# Patient Record
Sex: FEMALE | Race: OTHER | HISPANIC OR LATINO
[De-identification: names, ages, dates, MRNs, and addresses within clinical notes are randomized per-mention and may not be internally consistent; named-entity substitution may affect disease eponyms.]

---

## 2017-01-12 ENCOUNTER — APPOINTMENT (OUTPATIENT)
Dept: HEMATOLOGY ONCOLOGY | Facility: CLINIC | Age: 57
End: 2017-01-12

## 2017-05-03 ENCOUNTER — APPOINTMENT (OUTPATIENT)
Dept: CT IMAGING | Facility: IMAGING CENTER | Age: 57
End: 2017-05-03

## 2017-05-03 ENCOUNTER — OUTPATIENT (OUTPATIENT)
Dept: OUTPATIENT SERVICES | Facility: HOSPITAL | Age: 57
LOS: 1 days | End: 2017-05-03
Payer: COMMERCIAL

## 2017-05-03 DIAGNOSIS — Z00.8 ENCOUNTER FOR OTHER GENERAL EXAMINATION: ICD-10-CM

## 2017-05-03 PROCEDURE — 71260 CT THORAX DX C+: CPT

## 2017-05-04 ENCOUNTER — OUTPATIENT (OUTPATIENT)
Dept: OUTPATIENT SERVICES | Facility: HOSPITAL | Age: 57
LOS: 1 days | Discharge: HOME | End: 2017-05-04

## 2017-05-04 DIAGNOSIS — C34.90 MALIGNANT NEOPLASM OF UNSPECIFIED PART OF UNSPECIFIED BRONCHUS OR LUNG: ICD-10-CM

## 2017-05-05 ENCOUNTER — APPOINTMENT (OUTPATIENT)
Dept: HEMATOLOGY ONCOLOGY | Facility: CLINIC | Age: 57
End: 2017-05-05

## 2017-05-05 VITALS
DIASTOLIC BLOOD PRESSURE: 91 MMHG | OXYGEN SATURATION: 99 % | RESPIRATION RATE: 16 BRPM | SYSTOLIC BLOOD PRESSURE: 158 MMHG | BODY MASS INDEX: 30.77 KG/M2 | TEMPERATURE: 97.9 F | HEART RATE: 86 BPM | WEIGHT: 173.72 LBS

## 2017-11-27 ENCOUNTER — FORM ENCOUNTER (OUTPATIENT)
Age: 57
End: 2017-11-27

## 2017-11-28 ENCOUNTER — OUTPATIENT (OUTPATIENT)
Dept: OUTPATIENT SERVICES | Facility: HOSPITAL | Age: 57
LOS: 1 days | End: 2017-11-28
Payer: COMMERCIAL

## 2017-11-28 ENCOUNTER — APPOINTMENT (OUTPATIENT)
Dept: CT IMAGING | Facility: IMAGING CENTER | Age: 57
End: 2017-11-28
Payer: COMMERCIAL

## 2017-11-28 DIAGNOSIS — Z00.8 ENCOUNTER FOR OTHER GENERAL EXAMINATION: ICD-10-CM

## 2017-11-28 PROCEDURE — 71260 CT THORAX DX C+: CPT | Mod: 26

## 2017-11-28 PROCEDURE — 71260 CT THORAX DX C+: CPT

## 2017-11-29 ENCOUNTER — OUTPATIENT (OUTPATIENT)
Dept: OUTPATIENT SERVICES | Facility: HOSPITAL | Age: 57
LOS: 1 days | Discharge: HOME | End: 2017-11-29

## 2017-11-29 ENCOUNTER — APPOINTMENT (OUTPATIENT)
Dept: HEMATOLOGY ONCOLOGY | Facility: CLINIC | Age: 57
End: 2017-11-29
Payer: COMMERCIAL

## 2017-11-29 VITALS
SYSTOLIC BLOOD PRESSURE: 125 MMHG | RESPIRATION RATE: 16 BRPM | HEART RATE: 90 BPM | WEIGHT: 153.22 LBS | BODY MASS INDEX: 27.14 KG/M2 | DIASTOLIC BLOOD PRESSURE: 85 MMHG | OXYGEN SATURATION: 98 % | TEMPERATURE: 98 F

## 2017-11-29 DIAGNOSIS — C34.90 MALIGNANT NEOPLASM OF UNSPECIFIED PART OF UNSPECIFIED BRONCHUS OR LUNG: ICD-10-CM

## 2017-11-29 PROCEDURE — 99215 OFFICE O/P EST HI 40 MIN: CPT

## 2018-02-25 ENCOUNTER — FORM ENCOUNTER (OUTPATIENT)
Age: 58
End: 2018-02-25

## 2018-02-26 ENCOUNTER — APPOINTMENT (OUTPATIENT)
Dept: CT IMAGING | Facility: IMAGING CENTER | Age: 58
End: 2018-02-26
Payer: COMMERCIAL

## 2018-02-26 ENCOUNTER — OUTPATIENT (OUTPATIENT)
Dept: OUTPATIENT SERVICES | Facility: HOSPITAL | Age: 58
LOS: 1 days | End: 2018-02-26
Payer: COMMERCIAL

## 2018-02-26 DIAGNOSIS — C34.12 MALIGNANT NEOPLASM OF UPPER LOBE, LEFT BRONCHUS OR LUNG: ICD-10-CM

## 2018-02-26 PROCEDURE — 71260 CT THORAX DX C+: CPT | Mod: 26

## 2018-02-26 PROCEDURE — 71260 CT THORAX DX C+: CPT

## 2018-02-27 ENCOUNTER — APPOINTMENT (OUTPATIENT)
Dept: HEMATOLOGY ONCOLOGY | Facility: CLINIC | Age: 58
End: 2018-02-27
Payer: SELF-PAY

## 2018-02-27 ENCOUNTER — OUTPATIENT (OUTPATIENT)
Dept: OUTPATIENT SERVICES | Facility: HOSPITAL | Age: 58
LOS: 1 days | Discharge: HOME | End: 2018-02-27

## 2018-02-27 VITALS
WEIGHT: 158.73 LBS | HEART RATE: 102 BPM | BODY MASS INDEX: 28.12 KG/M2 | SYSTOLIC BLOOD PRESSURE: 120 MMHG | RESPIRATION RATE: 16 BRPM | OXYGEN SATURATION: 96 % | DIASTOLIC BLOOD PRESSURE: 70 MMHG | TEMPERATURE: 98 F

## 2018-02-27 DIAGNOSIS — C34.90 MALIGNANT NEOPLASM OF UNSPECIFIED PART OF UNSPECIFIED BRONCHUS OR LUNG: ICD-10-CM

## 2018-02-27 PROCEDURE — 99214 OFFICE O/P EST MOD 30 MIN: CPT

## 2018-09-09 ENCOUNTER — FORM ENCOUNTER (OUTPATIENT)
Age: 58
End: 2018-09-09

## 2018-09-10 ENCOUNTER — APPOINTMENT (OUTPATIENT)
Dept: CT IMAGING | Facility: IMAGING CENTER | Age: 58
End: 2018-09-10
Payer: COMMERCIAL

## 2018-09-10 ENCOUNTER — OUTPATIENT (OUTPATIENT)
Dept: OUTPATIENT SERVICES | Facility: HOSPITAL | Age: 58
LOS: 1 days | End: 2018-09-10
Payer: COMMERCIAL

## 2018-09-10 DIAGNOSIS — C34.12 MALIGNANT NEOPLASM OF UPPER LOBE, LEFT BRONCHUS OR LUNG: ICD-10-CM

## 2018-09-10 PROCEDURE — 71260 CT THORAX DX C+: CPT

## 2018-09-10 PROCEDURE — 71260 CT THORAX DX C+: CPT | Mod: 26

## 2018-09-11 ENCOUNTER — OUTPATIENT (OUTPATIENT)
Dept: OUTPATIENT SERVICES | Facility: HOSPITAL | Age: 58
LOS: 1 days | Discharge: HOME | End: 2018-09-11

## 2018-09-11 ENCOUNTER — APPOINTMENT (OUTPATIENT)
Dept: HEMATOLOGY ONCOLOGY | Facility: CLINIC | Age: 58
End: 2018-09-11

## 2018-09-11 DIAGNOSIS — C34.90 MALIGNANT NEOPLASM OF UNSPECIFIED PART OF UNSPECIFIED BRONCHUS OR LUNG: ICD-10-CM

## 2018-09-18 ENCOUNTER — APPOINTMENT (OUTPATIENT)
Dept: HEMATOLOGY ONCOLOGY | Facility: CLINIC | Age: 58
End: 2018-09-18
Payer: COMMERCIAL

## 2018-09-18 VITALS
HEART RATE: 94 BPM | SYSTOLIC BLOOD PRESSURE: 150 MMHG | BODY MASS INDEX: 27.53 KG/M2 | TEMPERATURE: 98.4 F | WEIGHT: 155.43 LBS | OXYGEN SATURATION: 98 % | RESPIRATION RATE: 16 BRPM | DIASTOLIC BLOOD PRESSURE: 83 MMHG

## 2018-09-18 PROCEDURE — 99214 OFFICE O/P EST MOD 30 MIN: CPT

## 2019-03-04 ENCOUNTER — OUTPATIENT (OUTPATIENT)
Dept: OUTPATIENT SERVICES | Facility: HOSPITAL | Age: 59
LOS: 1 days | Discharge: HOME | End: 2019-03-04

## 2019-03-04 DIAGNOSIS — C34.90 MALIGNANT NEOPLASM OF UNSPECIFIED PART OF UNSPECIFIED BRONCHUS OR LUNG: ICD-10-CM

## 2019-03-10 ENCOUNTER — FORM ENCOUNTER (OUTPATIENT)
Age: 59
End: 2019-03-10

## 2019-03-11 ENCOUNTER — APPOINTMENT (OUTPATIENT)
Dept: CT IMAGING | Facility: IMAGING CENTER | Age: 59
End: 2019-03-11
Payer: COMMERCIAL

## 2019-03-11 ENCOUNTER — OUTPATIENT (OUTPATIENT)
Dept: OUTPATIENT SERVICES | Facility: HOSPITAL | Age: 59
LOS: 1 days | End: 2019-03-11
Payer: COMMERCIAL

## 2019-03-11 DIAGNOSIS — C34.12 MALIGNANT NEOPLASM OF UPPER LOBE, LEFT BRONCHUS OR LUNG: ICD-10-CM

## 2019-03-11 DIAGNOSIS — R91.8 OTHER NONSPECIFIC ABNORMAL FINDING OF LUNG FIELD: ICD-10-CM

## 2019-03-11 PROCEDURE — 71250 CT THORAX DX C-: CPT | Mod: 26

## 2019-03-11 PROCEDURE — 71250 CT THORAX DX C-: CPT

## 2019-03-13 ENCOUNTER — APPOINTMENT (OUTPATIENT)
Dept: HEMATOLOGY ONCOLOGY | Facility: CLINIC | Age: 59
End: 2019-03-13
Payer: COMMERCIAL

## 2019-03-13 VITALS
RESPIRATION RATE: 18 BRPM | OXYGEN SATURATION: 98 % | SYSTOLIC BLOOD PRESSURE: 137 MMHG | BODY MASS INDEX: 28.27 KG/M2 | DIASTOLIC BLOOD PRESSURE: 83 MMHG | HEART RATE: 82 BPM | TEMPERATURE: 97.4 F | WEIGHT: 159.61 LBS

## 2019-03-13 PROCEDURE — 99214 OFFICE O/P EST MOD 30 MIN: CPT

## 2019-03-14 ENCOUNTER — OUTPATIENT (OUTPATIENT)
Dept: OUTPATIENT SERVICES | Facility: HOSPITAL | Age: 59
LOS: 1 days | Discharge: HOME | End: 2019-03-14

## 2019-03-15 ENCOUNTER — APPOINTMENT (OUTPATIENT)
Dept: RADIATION ONCOLOGY | Facility: CLINIC | Age: 59
End: 2019-03-15
Payer: COMMERCIAL

## 2019-03-15 VITALS
BODY MASS INDEX: 28.89 KG/M2 | OXYGEN SATURATION: 95 % | TEMPERATURE: 98.42 F | WEIGHT: 163.1 LBS | RESPIRATION RATE: 16 BRPM | SYSTOLIC BLOOD PRESSURE: 134 MMHG | HEART RATE: 80 BPM | DIASTOLIC BLOOD PRESSURE: 83 MMHG

## 2019-03-15 DIAGNOSIS — Z92.21 PERSONAL HISTORY OF ANTINEOPLASTIC CHEMOTHERAPY: ICD-10-CM

## 2019-03-15 DIAGNOSIS — Z78.9 OTHER SPECIFIED HEALTH STATUS: ICD-10-CM

## 2019-03-15 DIAGNOSIS — Z92.3 PERSONAL HISTORY OF IRRADIATION: ICD-10-CM

## 2019-03-15 DIAGNOSIS — E78.5 HYPERLIPIDEMIA, UNSPECIFIED: ICD-10-CM

## 2019-03-15 PROCEDURE — 99214 OFFICE O/P EST MOD 30 MIN: CPT

## 2019-03-15 NOTE — REVIEW OF SYSTEMS
[Cough] : cough [SOB on Exertion] : shortness of breath during exertion [Negative] : Heme/Lymph [Constipation: Grade 0] : Constipation: Grade 0 [Diarrhea: Grade 0] : Diarrhea: Grade 0 [Dysphagia: Grade 0] : Dysphagia: Grade 0 [Nausea: Grade 0] : Nausea: Grade 0 [Vomiting: Grade 0] : Vomiting: Grade 0 [Fatigue: Grade 0] : Fatigue: Grade 0 [Cough: Grade 1 - Mild symptoms; nonprescription intervention indicated] : Cough: Grade 1 - Mild symptoms; nonprescription intervention indicated [Dyspnea: Grade 1 - Shortness of breath with moderate exertion] : Dyspnea: Grade 1 - Shortness of breath with moderate exertion [FreeTextEntry6] : mucus-white [Hypoxia: Grade 0] : Hypoxia: Grade 0 [Pneumonitis: Grade 0] : Pneumonitis: Grade 0

## 2019-03-15 NOTE — LETTER CLOSING
[Consult Closing:] : Thank you for allowing me to participate in the care of this patient.  If you have any questions, please do not hesitate to contact me. [Sincerely yours,] : Sincerely yours, [FreeTextEntry3] : Arun Williamson MD\par  and Residency \par Department of Radiation Medicine\par [facility]\par [facility]\par [address] [place] [address]\[place] [address]\par Tel: [phone]\par Fax: [phone]\par \par \par

## 2019-03-15 NOTE — PHYSICAL EXAM
[Sclera] : the sclera and conjunctiva were normal [Normal] : oriented to person, place and time, the affect was normal, the mood was normal and not anxious

## 2019-03-15 NOTE — HISTORY OF PRESENT ILLNESS
[FreeTextEntry1] : Mrs. Lopez is a 55yo female with hx of Stage IIIB left lung NSCLC treated with radiation (66.6gy / 37 Fx's) and chemotherapy in 2013. She had a good response but presented in 2015 with enlarging MAICOL nodule. Staging EBUS/PET/MRI brain showed no evidence of regional or distant disease. Consequently we discussed local options. She was not deemed a good candidate for RFA. Biopsy was discussed but given potential for lower yield and the patient's wishes and preference to avoid biopsy, we elected to treat with SBRT based on the radiographic picture c/w isolated parenchymal recurrence. This was discussed and approved by the lung cancer tumor board at [facility]. Consequently we delivered 50Gy / 5 Fx's using SBRT and completed tx on 3/9/16. Initial post-treatment CT chest showed stable size lung nodule, although some additional bilateral subcentimeter nodules grew minimally in size to a maximium of 5mm. She has been following regularly with medical oncology. She is now referred back to us to evaluate a growing lt. apical lung nodule.\par \par Recent ct of chest performed 3/13/19 showed a 0.8cm lobulated nodule in the apical segment of the MAICOL which has slightly increased in comparison to previous scans from 0.8cm.  On my review of the scan this appears to be a separate nodule from the prior lesion treated with SBRT and I can see what are likely radiation changes in the left lung inferior to this region.  No other areas of progression were noted.  She denies pain.  She has mild to moderate WOOD and occ cough with white mucus.  Denies hemoptysis.  Does not need O2.  Able to complete ADL's.  \par \par

## 2019-03-24 ENCOUNTER — FORM ENCOUNTER (OUTPATIENT)
Age: 59
End: 2019-03-24

## 2019-03-25 ENCOUNTER — APPOINTMENT (OUTPATIENT)
Dept: NUCLEAR MEDICINE | Facility: IMAGING CENTER | Age: 59
End: 2019-03-25
Payer: COMMERCIAL

## 2019-03-25 ENCOUNTER — OUTPATIENT (OUTPATIENT)
Dept: OUTPATIENT SERVICES | Facility: HOSPITAL | Age: 59
LOS: 1 days | End: 2019-03-25
Payer: COMMERCIAL

## 2019-03-25 DIAGNOSIS — C34.12 MALIGNANT NEOPLASM OF UPPER LOBE, LEFT BRONCHUS OR LUNG: ICD-10-CM

## 2019-03-25 PROCEDURE — 78815 PET IMAGE W/CT SKULL-THIGH: CPT

## 2019-03-25 PROCEDURE — A9552: CPT

## 2019-03-25 PROCEDURE — 78815 PET IMAGE W/CT SKULL-THIGH: CPT | Mod: 26,PS

## 2019-03-28 ENCOUNTER — APPOINTMENT (OUTPATIENT)
Dept: RADIATION ONCOLOGY | Facility: CLINIC | Age: 59
End: 2019-03-28
Payer: COMMERCIAL

## 2019-03-28 VITALS
DIASTOLIC BLOOD PRESSURE: 89 MMHG | WEIGHT: 161.82 LBS | BODY MASS INDEX: 28.67 KG/M2 | HEART RATE: 85 BPM | SYSTOLIC BLOOD PRESSURE: 157 MMHG | RESPIRATION RATE: 16 BRPM | OXYGEN SATURATION: 97 %

## 2019-03-28 PROCEDURE — 99213 OFFICE O/P EST LOW 20 MIN: CPT | Mod: GC

## 2019-03-28 NOTE — REASON FOR VISIT
[Lung Cancer] : lung cancer [Family Member] : family member [Routine Follow-Up] : routine follow-up visit for

## 2019-03-28 NOTE — PHYSICAL EXAM
[Abdomen Soft] : soft [Nondistended] : nondistended [Normal] : oriented to person, place and time, the affect was normal, the mood was normal and not anxious

## 2019-03-28 NOTE — REVIEW OF SYSTEMS
[Cough] : cough [SOB on Exertion] : shortness of breath during exertion [Negative] : Allergic/Immunologic [Constipation: Grade 0] : Constipation: Grade 0 [Diarrhea: Grade 0] : Diarrhea: Grade 0 [Fatigue: Grade 0] : Fatigue: Grade 0 [Cough: Grade 1 - Mild symptoms; nonprescription intervention indicated] : Cough: Grade 1 - Mild symptoms; nonprescription intervention indicated [Dyspnea: Grade 1 - Shortness of breath with moderate exertion] : Dyspnea: Grade 1 - Shortness of breath with moderate exertion

## 2019-03-30 ENCOUNTER — APPOINTMENT (OUTPATIENT)
Dept: NUCLEAR MEDICINE | Facility: IMAGING CENTER | Age: 59
End: 2019-03-30

## 2019-04-08 ENCOUNTER — OUTPATIENT (OUTPATIENT)
Dept: OUTPATIENT SERVICES | Facility: HOSPITAL | Age: 59
LOS: 1 days | End: 2019-04-08
Payer: COMMERCIAL

## 2019-04-08 VITALS
SYSTOLIC BLOOD PRESSURE: 120 MMHG | RESPIRATION RATE: 16 BRPM | HEIGHT: 62 IN | OXYGEN SATURATION: 96 % | HEART RATE: 100 BPM | WEIGHT: 160.06 LBS | DIASTOLIC BLOOD PRESSURE: 80 MMHG | TEMPERATURE: 97 F

## 2019-04-08 DIAGNOSIS — J98.4 OTHER DISORDERS OF LUNG: ICD-10-CM

## 2019-04-08 DIAGNOSIS — C34.90 MALIGNANT NEOPLASM OF UNSPECIFIED PART OF UNSPECIFIED BRONCHUS OR LUNG: ICD-10-CM

## 2019-04-08 DIAGNOSIS — R06.2 WHEEZING: ICD-10-CM

## 2019-04-08 DIAGNOSIS — Z85.118 PERSONAL HISTORY OF OTHER MALIGNANT NEOPLASM OF BRONCHUS AND LUNG: Chronic | ICD-10-CM

## 2019-04-08 DIAGNOSIS — O03.9 COMPLETE OR UNSPECIFIED SPONTANEOUS ABORTION WITHOUT COMPLICATION: Chronic | ICD-10-CM

## 2019-04-08 DIAGNOSIS — Z98.890 OTHER SPECIFIED POSTPROCEDURAL STATES: Chronic | ICD-10-CM

## 2019-04-08 DIAGNOSIS — T78.40XA ALLERGY, UNSPECIFIED, INITIAL ENCOUNTER: ICD-10-CM

## 2019-04-08 LAB
ANION GAP SERPL CALC-SCNC: 14 MMO/L — SIGNIFICANT CHANGE UP (ref 7–14)
BUN SERPL-MCNC: 19 MG/DL — SIGNIFICANT CHANGE UP (ref 7–23)
CALCIUM SERPL-MCNC: 10.3 MG/DL — SIGNIFICANT CHANGE UP (ref 8.4–10.5)
CHLORIDE SERPL-SCNC: 101 MMOL/L — SIGNIFICANT CHANGE UP (ref 98–107)
CO2 SERPL-SCNC: 25 MMOL/L — SIGNIFICANT CHANGE UP (ref 22–31)
CREAT SERPL-MCNC: 0.85 MG/DL — SIGNIFICANT CHANGE UP (ref 0.5–1.3)
GLUCOSE SERPL-MCNC: 83 MG/DL — SIGNIFICANT CHANGE UP (ref 70–99)
HCT VFR BLD CALC: 45.7 % — HIGH (ref 34.5–45)
HGB BLD-MCNC: 15 G/DL — SIGNIFICANT CHANGE UP (ref 11.5–15.5)
MCHC RBC-ENTMCNC: 30.2 PG — SIGNIFICANT CHANGE UP (ref 27–34)
MCHC RBC-ENTMCNC: 32.8 % — SIGNIFICANT CHANGE UP (ref 32–36)
MCV RBC AUTO: 92.1 FL — SIGNIFICANT CHANGE UP (ref 80–100)
NRBC # FLD: 0 K/UL — SIGNIFICANT CHANGE UP (ref 0–0)
PLATELET # BLD AUTO: 394 K/UL — SIGNIFICANT CHANGE UP (ref 150–400)
PMV BLD: 9.7 FL — SIGNIFICANT CHANGE UP (ref 7–13)
POTASSIUM SERPL-MCNC: 3.4 MMOL/L — LOW (ref 3.5–5.3)
POTASSIUM SERPL-SCNC: 3.4 MMOL/L — LOW (ref 3.5–5.3)
RBC # BLD: 4.96 M/UL — SIGNIFICANT CHANGE UP (ref 3.8–5.2)
RBC # FLD: 12.3 % — SIGNIFICANT CHANGE UP (ref 10.3–14.5)
SODIUM SERPL-SCNC: 140 MMOL/L — SIGNIFICANT CHANGE UP (ref 135–145)
WBC # BLD: 19.63 K/UL — HIGH (ref 3.8–10.5)
WBC # FLD AUTO: 19.63 K/UL — HIGH (ref 3.8–10.5)

## 2019-04-08 PROCEDURE — 93010 ELECTROCARDIOGRAM REPORT: CPT

## 2019-04-08 NOTE — H&P PST ADULT - NSANTHOSAYNRD_GEN_A_CORE
No. DELGADO screening performed.  STOP BANG Legend: 0-2 = LOW Risk; 3-4 = INTERMEDIATE Risk; 5-8 = HIGH Risk

## 2019-04-08 NOTE — H&P PST ADULT - HISTORY OF PRESENT ILLNESS
This is a 59 y/o female who presents with h/o lung cancer in 2012 and treated with chemotherapy and RT but NO surgery. Followed with serial scans. Recent testing revealed lung abnormality. Scheduled for endobronchial US bronchoscopy on 4-11-19

## 2019-04-08 NOTE — H&P PST ADULT - NSICDXPASTSURGICALHX_GEN_ALL_CORE_FT
PAST SURGICAL HISTORY:  Fibroid embolization    H/O induced  1x    H/O: lung cancer in 2012, bronchoscopy and biopsy with diagnosis of lung cancer -- treated with chemotherapy and RT but NO surgery    Miscarriage required D&C    S/P  Section

## 2019-04-08 NOTE — H&P PST ADULT - NSICDXPASTMEDICALHX_GEN_ALL_CORE_FT
PAST MEDICAL HISTORY:  Emphysema     Hypothyroidism     Lung abnormality April 2019    Lung cancer in 2012 underwent chemotherapy and RT with NO surgery    Smoking

## 2019-04-08 NOTE — H&P PST ADULT - NSICDXPROBLEM_GEN_ALL_CORE_FT
PROBLEM DIAGNOSES  Problem: Lung abnormality  Assessment and Plan: This is a  59 y/o female who is scheduled for endobronchial US bronchoscopy on 4-11-19  * Given preop instructions    Problem: Wheezing  Assessment and Plan: Await medical evaluation with pcp due to c/o wheezing as recent as last night  * Instructed to take normal am dose of levothyroxine and loratadine the am of surgery  * Await old ekg for comparison from pcp PROBLEM DIAGNOSES  Problem: Lung abnormality  Assessment and Plan: This is a  57 y/o female who is scheduled for endobronchial US bronchoscopy on 4-11-19  * Given preop instructions    Problem: Wheezing  Assessment and Plan: Await medical evaluation with pcp due to c/o wheezing as recent as last night  * Instructed to take normal am dose of levothyroxine and loratadine the am of surgery  * Await old ekg for comparison from pcp    Problem: Allergy  Assessment and Plan: Notified OR booking via fax of PCN allergy

## 2019-04-09 PROBLEM — C34.90 MALIGNANT NEOPLASM OF UNSPECIFIED PART OF UNSPECIFIED BRONCHUS OR LUNG: Chronic | Status: ACTIVE | Noted: 2019-04-08

## 2019-04-09 PROBLEM — J98.4 OTHER DISORDERS OF LUNG: Chronic | Status: ACTIVE | Noted: 2019-04-08

## 2019-04-10 NOTE — ASU PATIENT PROFILE, ADULT - PSH
Fibroid  embolization  H/O induced   1x  H/O: lung cancer  in , bronchoscopy and biopsy with diagnosis of lung cancer -- treated with chemotherapy and RT but NO surgery  Miscarriage  required D&C  S/P  Section

## 2019-04-10 NOTE — ASU PATIENT PROFILE, ADULT - PMH
Hypothyroidism    Lung abnormality  April 2019  Lung cancer  in 2012 underwent chemotherapy and RT with NO surgery  Smoking

## 2019-04-11 ENCOUNTER — APPOINTMENT (OUTPATIENT)
Dept: PULMONOLOGY | Facility: HOSPITAL | Age: 59
End: 2019-04-11

## 2019-04-14 NOTE — ADDENDUM
[FreeTextEntry1] : Tumor board consensus is to proceed with EBUS to try to sample the hilar LN.  We will refer her to Dr. Ho for this procedure and she will then f/u with Dr. Romo and I in regards to recommendations.  LIkely if the LN is positive she will be treated with further systemic therapy alone.

## 2019-04-14 NOTE — HISTORY OF PRESENT ILLNESS
[FreeTextEntry1] : Mrs. Lopez is a 55yo female with hx of Stage IIIB left lung NSCLC treated with radiation (66.6gy / 37 Fx's) and chemotherapy in 2013. She had a good response but presented in 2015 with enlarging MAICOL nodule. Staging EBUS/PET/MRI brain showed no evidence of regional or distant disease. Consequently we discussed local options. She was not deemed a good candidate for RFA. Biopsy was discussed but given potential for lower yield and the patient's wishes and preference to avoid biopsy, we elected to treat with SBRT based on the radiographic picture c/w isolated parenchymal recurrence. This was discussed and approved by the lung cancer tumor board at [facility]. Consequently we delivered 50Gy / 5 Fx's using SBRT and completed tx on 3/9/16. Initial post-treatment CT chest showed stable size lung nodule, although some additional bilateral subcentimeter nodules grew minimally in size to a maximium of 5mm. She has been following regularly with medical oncology. She is now referred back to us to evaluate a growing lt. apical lung nodule.\par \par Recent ct of chest performed 3/13/19 showed a 0.8cm lobulated nodule in the apical segment of the MAICOL which has slightly increased in comparison to previous scans from 0.8cm.  On my review of the scan this appears to be a separate nodule from the prior lesion treated with SBRT and I can see what are likely radiation changes in the left lung inferior to this region.  No other areas of progression were noted.  \par \par PET scan 3/25/19 showed avidity in the nodule in the MAICOL as well as new nodule in the left hilum. The MAICOL nodule is superior to her nodule that was treated with SBRT. The left hillar nodule is at the edge of RT field in 2013. Final report pending.\par \par Today she denies pain.  She has mild to moderate WOOD and occ cough with white mucus.  Denies hemoptysis.  Does not need O2.  Able to complete ADL's.  \par \par

## 2019-04-25 ENCOUNTER — RESULT REVIEW (OUTPATIENT)
Age: 59
End: 2019-04-25

## 2019-04-25 ENCOUNTER — APPOINTMENT (OUTPATIENT)
Dept: PULMONOLOGY | Facility: HOSPITAL | Age: 59
End: 2019-04-25

## 2019-04-25 ENCOUNTER — OUTPATIENT (OUTPATIENT)
Dept: OUTPATIENT SERVICES | Facility: HOSPITAL | Age: 59
LOS: 1 days | Discharge: HOME | End: 2019-04-25
Payer: COMMERCIAL

## 2019-04-25 VITALS
WEIGHT: 160.06 LBS | RESPIRATION RATE: 16 BRPM | DIASTOLIC BLOOD PRESSURE: 72 MMHG | OXYGEN SATURATION: 96 % | TEMPERATURE: 98 F | SYSTOLIC BLOOD PRESSURE: 128 MMHG | HEIGHT: 62 IN | HEART RATE: 80 BPM

## 2019-04-25 VITALS
OXYGEN SATURATION: 100 % | RESPIRATION RATE: 14 BRPM | HEART RATE: 84 BPM | SYSTOLIC BLOOD PRESSURE: 124 MMHG | DIASTOLIC BLOOD PRESSURE: 60 MMHG

## 2019-04-25 DIAGNOSIS — O03.9 COMPLETE OR UNSPECIFIED SPONTANEOUS ABORTION WITHOUT COMPLICATION: Chronic | ICD-10-CM

## 2019-04-25 DIAGNOSIS — Z85.118 PERSONAL HISTORY OF OTHER MALIGNANT NEOPLASM OF BRONCHUS AND LUNG: Chronic | ICD-10-CM

## 2019-04-25 DIAGNOSIS — Z98.890 OTHER SPECIFIED POSTPROCEDURAL STATES: Chronic | ICD-10-CM

## 2019-04-25 DIAGNOSIS — C34.90 MALIGNANT NEOPLASM OF UNSPECIFIED PART OF UNSPECIFIED BRONCHUS OR LUNG: ICD-10-CM

## 2019-04-25 PROCEDURE — 88342 IMHCHEM/IMCYTCHM 1ST ANTB: CPT | Mod: 26

## 2019-04-25 PROCEDURE — 88305 TISSUE EXAM BY PATHOLOGIST: CPT | Mod: 26

## 2019-04-25 PROCEDURE — 88173 CYTOPATH EVAL FNA REPORT: CPT | Mod: 26

## 2019-04-25 PROCEDURE — 31652 BRONCH EBUS SAMPLNG 1/2 NODE: CPT | Mod: GC

## 2019-04-25 PROCEDURE — 88341 IMHCHEM/IMCYTCHM EA ADD ANTB: CPT | Mod: 26

## 2019-04-25 RX ORDER — LEVOTHYROXINE SODIUM 125 MCG
1 TABLET ORAL
Qty: 0 | Refills: 0 | COMMUNITY

## 2019-04-25 RX ORDER — LEVOTHYROXINE SODIUM 125 MCG
125 TABLET ORAL DAILY
Qty: 0 | Refills: 0 | Status: DISCONTINUED | OUTPATIENT
Start: 2019-04-25 | End: 2019-05-10

## 2019-04-25 RX ORDER — SODIUM CHLORIDE 9 MG/ML
1000 INJECTION, SOLUTION INTRAVENOUS
Qty: 0 | Refills: 0 | Status: DISCONTINUED | OUTPATIENT
Start: 2019-04-25 | End: 2019-04-25

## 2019-04-25 RX ORDER — LORATADINE 10 MG/1
10 TABLET ORAL DAILY
Qty: 0 | Refills: 0 | Status: DISCONTINUED | OUTPATIENT
Start: 2019-04-25 | End: 2019-05-10

## 2019-04-25 RX ORDER — LORATADINE 10 MG/1
1 TABLET ORAL
Qty: 0 | Refills: 0 | COMMUNITY

## 2019-04-25 RX ADMIN — SODIUM CHLORIDE 30 MILLILITER(S): 9 INJECTION, SOLUTION INTRAVENOUS at 08:08

## 2019-04-25 NOTE — ASU DISCHARGE PLAN (ADULT/PEDIATRIC) - POST OP PHONE #
[phone] or [phone] pt. granted permission to leave message /and or speak with whoever answers the phone.

## 2019-04-25 NOTE — ASU PREOP CHECKLIST - COMMENTS
Pt took at 6:20am with sip of water, levothyroxine and loratadine, Pt took Asmanex and Ventolin inhaler also

## 2019-05-02 ENCOUNTER — OUTPATIENT (OUTPATIENT)
Dept: OUTPATIENT SERVICES | Facility: HOSPITAL | Age: 59
LOS: 1 days | Discharge: HOME | End: 2019-05-02

## 2019-05-02 DIAGNOSIS — Z85.118 PERSONAL HISTORY OF OTHER MALIGNANT NEOPLASM OF BRONCHUS AND LUNG: Chronic | ICD-10-CM

## 2019-05-02 DIAGNOSIS — Z98.890 OTHER SPECIFIED POSTPROCEDURAL STATES: Chronic | ICD-10-CM

## 2019-05-02 DIAGNOSIS — C34.90 MALIGNANT NEOPLASM OF UNSPECIFIED PART OF UNSPECIFIED BRONCHUS OR LUNG: ICD-10-CM

## 2019-05-02 DIAGNOSIS — O03.9 COMPLETE OR UNSPECIFIED SPONTANEOUS ABORTION WITHOUT COMPLICATION: Chronic | ICD-10-CM

## 2019-05-03 ENCOUNTER — APPOINTMENT (OUTPATIENT)
Dept: HEMATOLOGY ONCOLOGY | Facility: CLINIC | Age: 59
End: 2019-05-03
Payer: COMMERCIAL

## 2019-05-03 VITALS
RESPIRATION RATE: 16 BRPM | SYSTOLIC BLOOD PRESSURE: 137 MMHG | WEIGHT: 158.73 LBS | BODY MASS INDEX: 28.12 KG/M2 | TEMPERATURE: 209.12 F | HEART RATE: 74 BPM | DIASTOLIC BLOOD PRESSURE: 79 MMHG | OXYGEN SATURATION: 97 %

## 2019-05-03 PROCEDURE — 99215 OFFICE O/P EST HI 40 MIN: CPT

## 2019-05-07 ENCOUNTER — APPOINTMENT (OUTPATIENT)
Dept: INFUSION THERAPY | Facility: HOSPITAL | Age: 59
End: 2019-05-07

## 2019-05-08 ENCOUNTER — FORM ENCOUNTER (OUTPATIENT)
Age: 59
End: 2019-05-08

## 2019-05-08 ENCOUNTER — APPOINTMENT (OUTPATIENT)
Dept: INFUSION THERAPY | Facility: HOSPITAL | Age: 59
End: 2019-05-08

## 2019-05-09 ENCOUNTER — OUTPATIENT (OUTPATIENT)
Dept: OUTPATIENT SERVICES | Facility: HOSPITAL | Age: 59
LOS: 1 days | End: 2019-05-09
Payer: COMMERCIAL

## 2019-05-09 ENCOUNTER — APPOINTMENT (OUTPATIENT)
Dept: CT IMAGING | Facility: CLINIC | Age: 59
End: 2019-05-09
Payer: COMMERCIAL

## 2019-05-09 DIAGNOSIS — O03.9 COMPLETE OR UNSPECIFIED SPONTANEOUS ABORTION WITHOUT COMPLICATION: Chronic | ICD-10-CM

## 2019-05-09 DIAGNOSIS — Z98.890 OTHER SPECIFIED POSTPROCEDURAL STATES: Chronic | ICD-10-CM

## 2019-05-09 DIAGNOSIS — Z85.118 PERSONAL HISTORY OF OTHER MALIGNANT NEOPLASM OF BRONCHUS AND LUNG: Chronic | ICD-10-CM

## 2019-05-09 DIAGNOSIS — C34.12 MALIGNANT NEOPLASM OF UPPER LOBE, LEFT BRONCHUS OR LUNG: ICD-10-CM

## 2019-05-09 PROCEDURE — 71250 CT THORAX DX C-: CPT | Mod: 26

## 2019-05-09 PROCEDURE — 71250 CT THORAX DX C-: CPT

## 2019-05-12 ENCOUNTER — FORM ENCOUNTER (OUTPATIENT)
Age: 59
End: 2019-05-12

## 2019-05-13 ENCOUNTER — APPOINTMENT (OUTPATIENT)
Dept: MRI IMAGING | Facility: CLINIC | Age: 59
End: 2019-05-13
Payer: COMMERCIAL

## 2019-05-13 ENCOUNTER — OUTPATIENT (OUTPATIENT)
Dept: OUTPATIENT SERVICES | Facility: HOSPITAL | Age: 59
LOS: 1 days | End: 2019-05-13
Payer: COMMERCIAL

## 2019-05-13 DIAGNOSIS — Z98.890 OTHER SPECIFIED POSTPROCEDURAL STATES: Chronic | ICD-10-CM

## 2019-05-13 DIAGNOSIS — Z85.118 PERSONAL HISTORY OF OTHER MALIGNANT NEOPLASM OF BRONCHUS AND LUNG: Chronic | ICD-10-CM

## 2019-05-13 DIAGNOSIS — C34.12 MALIGNANT NEOPLASM OF UPPER LOBE, LEFT BRONCHUS OR LUNG: ICD-10-CM

## 2019-05-13 DIAGNOSIS — O03.9 COMPLETE OR UNSPECIFIED SPONTANEOUS ABORTION WITHOUT COMPLICATION: Chronic | ICD-10-CM

## 2019-05-13 PROCEDURE — 70553 MRI BRAIN STEM W/O & W/DYE: CPT

## 2019-05-13 PROCEDURE — 70553 MRI BRAIN STEM W/O & W/DYE: CPT | Mod: 26

## 2019-05-13 PROCEDURE — A9585: CPT

## 2019-05-14 DIAGNOSIS — Z51.11 ENCOUNTER FOR ANTINEOPLASTIC CHEMOTHERAPY: ICD-10-CM

## 2019-05-24 ENCOUNTER — APPOINTMENT (OUTPATIENT)
Dept: INFUSION THERAPY | Facility: HOSPITAL | Age: 59
End: 2019-05-24

## 2019-05-24 ENCOUNTER — LABORATORY RESULT (OUTPATIENT)
Age: 59
End: 2019-05-24

## 2019-05-24 ENCOUNTER — RESULT REVIEW (OUTPATIENT)
Age: 59
End: 2019-05-24

## 2019-05-24 LAB
BASOPHILS # BLD AUTO: 0.1 K/UL — SIGNIFICANT CHANGE UP (ref 0–0.2)
BASOPHILS NFR BLD AUTO: 0.4 % — SIGNIFICANT CHANGE UP (ref 0–2)
EOSINOPHIL # BLD AUTO: 0.6 K/UL — HIGH (ref 0–0.5)
EOSINOPHIL NFR BLD AUTO: 4.8 % — SIGNIFICANT CHANGE UP (ref 0–6)
HCT VFR BLD CALC: 42.4 % — SIGNIFICANT CHANGE UP (ref 34.5–45)
HGB BLD-MCNC: 14.8 G/DL — SIGNIFICANT CHANGE UP (ref 11.5–15.5)
LYMPHOCYTES # BLD AUTO: 19.9 % — SIGNIFICANT CHANGE UP (ref 13–44)
LYMPHOCYTES # BLD AUTO: 2.3 K/UL — SIGNIFICANT CHANGE UP (ref 1–3.3)
MCHC RBC-ENTMCNC: 30.8 PG — SIGNIFICANT CHANGE UP (ref 27–34)
MCHC RBC-ENTMCNC: 35 G/DL — SIGNIFICANT CHANGE UP (ref 32–36)
MCV RBC AUTO: 88.1 FL — SIGNIFICANT CHANGE UP (ref 80–100)
MONOCYTES # BLD AUTO: 0.8 K/UL — SIGNIFICANT CHANGE UP (ref 0–0.9)
MONOCYTES NFR BLD AUTO: 7.2 % — SIGNIFICANT CHANGE UP (ref 2–14)
NEUTROPHILS # BLD AUTO: 8 K/UL — HIGH (ref 1.8–7.4)
NEUTROPHILS NFR BLD AUTO: 67.7 % — SIGNIFICANT CHANGE UP (ref 43–77)
PLATELET # BLD AUTO: 386 K/UL — SIGNIFICANT CHANGE UP (ref 150–400)
RBC # BLD: 4.81 M/UL — SIGNIFICANT CHANGE UP (ref 3.8–5.2)
RBC # FLD: 11.4 % — SIGNIFICANT CHANGE UP (ref 10.3–14.5)
WBC # BLD: 11.8 K/UL — HIGH (ref 3.8–10.5)
WBC # FLD AUTO: 11.8 K/UL — HIGH (ref 3.8–10.5)

## 2019-05-28 DIAGNOSIS — R11.2 NAUSEA WITH VOMITING, UNSPECIFIED: ICD-10-CM

## 2019-05-31 ENCOUNTER — OUTPATIENT (OUTPATIENT)
Dept: OUTPATIENT SERVICES | Facility: HOSPITAL | Age: 59
LOS: 1 days | Discharge: HOME | End: 2019-05-31

## 2019-05-31 DIAGNOSIS — O03.9 COMPLETE OR UNSPECIFIED SPONTANEOUS ABORTION WITHOUT COMPLICATION: Chronic | ICD-10-CM

## 2019-05-31 DIAGNOSIS — C34.90 MALIGNANT NEOPLASM OF UNSPECIFIED PART OF UNSPECIFIED BRONCHUS OR LUNG: ICD-10-CM

## 2019-05-31 DIAGNOSIS — Z98.890 OTHER SPECIFIED POSTPROCEDURAL STATES: Chronic | ICD-10-CM

## 2019-05-31 DIAGNOSIS — Z85.118 PERSONAL HISTORY OF OTHER MALIGNANT NEOPLASM OF BRONCHUS AND LUNG: Chronic | ICD-10-CM

## 2019-06-05 ENCOUNTER — APPOINTMENT (OUTPATIENT)
Dept: HEMATOLOGY ONCOLOGY | Facility: CLINIC | Age: 59
End: 2019-06-05
Payer: COMMERCIAL

## 2019-06-05 ENCOUNTER — RESULT REVIEW (OUTPATIENT)
Age: 59
End: 2019-06-05

## 2019-06-05 VITALS
TEMPERATURE: 98.2 F | BODY MASS INDEX: 28.51 KG/M2 | SYSTOLIC BLOOD PRESSURE: 120 MMHG | OXYGEN SATURATION: 96 % | HEART RATE: 83 BPM | DIASTOLIC BLOOD PRESSURE: 72 MMHG | WEIGHT: 160.94 LBS | RESPIRATION RATE: 18 BRPM

## 2019-06-05 LAB
ALBUMIN SERPL ELPH-MCNC: 4.3 G/DL
ALP BLD-CCNC: 72 U/L
ALT SERPL-CCNC: 84 U/L
ANION GAP SERPL CALC-SCNC: 11 MMOL/L
AST SERPL-CCNC: 37 U/L
BASOPHILS # BLD AUTO: 0 K/UL — SIGNIFICANT CHANGE UP (ref 0–0.2)
BASOPHILS NFR BLD AUTO: 0.5 % — SIGNIFICANT CHANGE UP (ref 0–2)
BILIRUB SERPL-MCNC: 0.2 MG/DL
BUN SERPL-MCNC: 9 MG/DL
CALCIUM SERPL-MCNC: 9.6 MG/DL
CHLORIDE SERPL-SCNC: 106 MMOL/L
CO2 SERPL-SCNC: 23 MMOL/L
CREAT SERPL-MCNC: 0.6 MG/DL
EOSINOPHIL # BLD AUTO: 0.2 K/UL — SIGNIFICANT CHANGE UP (ref 0–0.5)
EOSINOPHIL NFR BLD AUTO: 3.8 % — SIGNIFICANT CHANGE UP (ref 0–6)
GLUCOSE SERPL-MCNC: 93 MG/DL
HCT VFR BLD CALC: 35.9 % — SIGNIFICANT CHANGE UP (ref 34.5–45)
HGB BLD-MCNC: 13.4 G/DL — SIGNIFICANT CHANGE UP (ref 11.5–15.5)
LYMPHOCYTES # BLD AUTO: 1.6 K/UL — SIGNIFICANT CHANGE UP (ref 1–3.3)
LYMPHOCYTES # BLD AUTO: 33.9 % — SIGNIFICANT CHANGE UP (ref 13–44)
MCHC RBC-ENTMCNC: 33.7 PG — SIGNIFICANT CHANGE UP (ref 27–34)
MCHC RBC-ENTMCNC: 37.2 G/DL — HIGH (ref 32–36)
MCV RBC AUTO: 90.5 FL — SIGNIFICANT CHANGE UP (ref 80–100)
MONOCYTES # BLD AUTO: 0.6 K/UL — SIGNIFICANT CHANGE UP (ref 0–0.9)
MONOCYTES NFR BLD AUTO: 13.7 % — SIGNIFICANT CHANGE UP (ref 2–14)
NEUTROPHILS # BLD AUTO: 2.2 K/UL — SIGNIFICANT CHANGE UP (ref 1.8–7.4)
NEUTROPHILS NFR BLD AUTO: 48.2 % — SIGNIFICANT CHANGE UP (ref 43–77)
PLATELET # BLD AUTO: 197 K/UL — SIGNIFICANT CHANGE UP (ref 150–400)
POTASSIUM SERPL-SCNC: 4.5 MMOL/L
PROT SERPL-MCNC: 7.2 G/DL
RBC # BLD: 3.96 M/UL — SIGNIFICANT CHANGE UP (ref 3.8–5.2)
RBC # FLD: 11.4 % — SIGNIFICANT CHANGE UP (ref 10.3–14.5)
SODIUM SERPL-SCNC: 140 MMOL/L
WBC # BLD: 4.7 K/UL — SIGNIFICANT CHANGE UP (ref 3.8–10.5)
WBC # FLD AUTO: 4.7 K/UL — SIGNIFICANT CHANGE UP (ref 3.8–10.5)

## 2019-06-05 PROCEDURE — 99214 OFFICE O/P EST MOD 30 MIN: CPT

## 2019-06-13 ENCOUNTER — RESULT REVIEW (OUTPATIENT)
Age: 59
End: 2019-06-13

## 2019-06-13 ENCOUNTER — LABORATORY RESULT (OUTPATIENT)
Age: 59
End: 2019-06-13

## 2019-06-13 ENCOUNTER — APPOINTMENT (OUTPATIENT)
Dept: HEMATOLOGY ONCOLOGY | Facility: CLINIC | Age: 59
End: 2019-06-13
Payer: COMMERCIAL

## 2019-06-13 ENCOUNTER — APPOINTMENT (OUTPATIENT)
Dept: INFUSION THERAPY | Facility: HOSPITAL | Age: 59
End: 2019-06-13

## 2019-06-13 LAB
BASOPHILS # BLD AUTO: 0 K/UL — SIGNIFICANT CHANGE UP (ref 0–0.2)
EOSINOPHIL # BLD AUTO: 0 K/UL — SIGNIFICANT CHANGE UP (ref 0–0.5)
EOSINOPHIL NFR BLD AUTO: 2 % — SIGNIFICANT CHANGE UP (ref 0–6)
HCT VFR BLD CALC: 39 % — SIGNIFICANT CHANGE UP (ref 34.5–45)
HGB BLD-MCNC: 14.1 G/DL — SIGNIFICANT CHANGE UP (ref 11.5–15.5)
LYMPHOCYTES # BLD AUTO: 2.3 K/UL — SIGNIFICANT CHANGE UP (ref 1–3.3)
LYMPHOCYTES # BLD AUTO: 27 % — SIGNIFICANT CHANGE UP (ref 13–44)
MCHC RBC-ENTMCNC: 33.1 PG — SIGNIFICANT CHANGE UP (ref 27–34)
MCHC RBC-ENTMCNC: 36.1 G/DL — HIGH (ref 32–36)
MCV RBC AUTO: 91.6 FL — SIGNIFICANT CHANGE UP (ref 80–100)
MONOCYTES # BLD AUTO: 0.9 K/UL — SIGNIFICANT CHANGE UP (ref 0–0.9)
MONOCYTES NFR BLD AUTO: 6 % — SIGNIFICANT CHANGE UP (ref 2–14)
NEUTROPHILS # BLD AUTO: 4.1 K/UL — SIGNIFICANT CHANGE UP (ref 1.8–7.4)
NEUTROPHILS NFR BLD AUTO: 65 % — SIGNIFICANT CHANGE UP (ref 43–77)
PLAT MORPH BLD: NORMAL — SIGNIFICANT CHANGE UP
PLATELET # BLD AUTO: 413 K/UL — HIGH (ref 150–400)
RBC # BLD: 4.26 M/UL — SIGNIFICANT CHANGE UP (ref 3.8–5.2)
RBC # FLD: 13.1 % — SIGNIFICANT CHANGE UP (ref 10.3–14.5)
RBC BLD AUTO: SIGNIFICANT CHANGE UP
WBC # BLD: 7.4 K/UL — SIGNIFICANT CHANGE UP (ref 3.8–10.5)
WBC # FLD AUTO: 7.4 K/UL — SIGNIFICANT CHANGE UP (ref 3.8–10.5)

## 2019-06-13 PROCEDURE — 99214 OFFICE O/P EST MOD 30 MIN: CPT

## 2019-06-14 DIAGNOSIS — R11.2 NAUSEA WITH VOMITING, UNSPECIFIED: ICD-10-CM

## 2019-06-14 DIAGNOSIS — Z51.11 ENCOUNTER FOR ANTINEOPLASTIC CHEMOTHERAPY: ICD-10-CM

## 2019-06-26 ENCOUNTER — RESULT REVIEW (OUTPATIENT)
Age: 59
End: 2019-06-26

## 2019-06-26 ENCOUNTER — APPOINTMENT (OUTPATIENT)
Dept: HEMATOLOGY ONCOLOGY | Facility: CLINIC | Age: 59
End: 2019-06-26
Payer: COMMERCIAL

## 2019-06-26 VITALS
TEMPERATURE: 97.8 F | WEIGHT: 162.04 LBS | HEART RATE: 82 BPM | DIASTOLIC BLOOD PRESSURE: 79 MMHG | SYSTOLIC BLOOD PRESSURE: 135 MMHG | RESPIRATION RATE: 16 BRPM | BODY MASS INDEX: 28.7 KG/M2 | OXYGEN SATURATION: 96 %

## 2019-06-26 LAB
ALBUMIN SERPL ELPH-MCNC: 4.3 G/DL
ALP BLD-CCNC: 73 U/L
ALT SERPL-CCNC: 132 U/L
ANION GAP SERPL CALC-SCNC: 12 MMOL/L
AST SERPL-CCNC: 78 U/L
BASOPHILS # BLD AUTO: 0 K/UL — SIGNIFICANT CHANGE UP (ref 0–0.2)
BILIRUB SERPL-MCNC: <0.2 MG/DL
BUN SERPL-MCNC: 8 MG/DL
CALCIUM SERPL-MCNC: 9.6 MG/DL
CEA SERPL-MCNC: 4.1 NG/ML
CHLORIDE SERPL-SCNC: 107 MMOL/L
CO2 SERPL-SCNC: 22 MMOL/L
CREAT SERPL-MCNC: 0.66 MG/DL
EOSINOPHIL # BLD AUTO: 0.2 K/UL — SIGNIFICANT CHANGE UP (ref 0–0.5)
EOSINOPHIL NFR BLD AUTO: 2 % — SIGNIFICANT CHANGE UP (ref 0–6)
GLUCOSE SERPL-MCNC: 108 MG/DL
HCT VFR BLD CALC: 36.7 % — SIGNIFICANT CHANGE UP (ref 34.5–45)
HGB BLD-MCNC: 12.6 G/DL — SIGNIFICANT CHANGE UP (ref 11.5–15.5)
LYMPHOCYTES # BLD AUTO: 1.9 K/UL — SIGNIFICANT CHANGE UP (ref 1–3.3)
LYMPHOCYTES # BLD AUTO: 33 % — SIGNIFICANT CHANGE UP (ref 13–44)
MCHC RBC-ENTMCNC: 31.8 PG — SIGNIFICANT CHANGE UP (ref 27–34)
MCHC RBC-ENTMCNC: 34.3 G/DL — SIGNIFICANT CHANGE UP (ref 32–36)
MCV RBC AUTO: 92.9 FL — SIGNIFICANT CHANGE UP (ref 80–100)
MONOCYTES # BLD AUTO: 1.3 K/UL — HIGH (ref 0–0.9)
MONOCYTES NFR BLD AUTO: 21 % — HIGH (ref 2–14)
NEUTROPHILS # BLD AUTO: 2.6 K/UL — SIGNIFICANT CHANGE UP (ref 1.8–7.4)
NEUTROPHILS NFR BLD AUTO: 44 % — SIGNIFICANT CHANGE UP (ref 43–77)
PLAT MORPH BLD: NORMAL — SIGNIFICANT CHANGE UP
PLATELET # BLD AUTO: 298 K/UL — SIGNIFICANT CHANGE UP (ref 150–400)
POTASSIUM SERPL-SCNC: 4.3 MMOL/L
PROT SERPL-MCNC: 7.2 G/DL
RBC # BLD: 3.95 M/UL — SIGNIFICANT CHANGE UP (ref 3.8–5.2)
RBC # FLD: 13.4 % — SIGNIFICANT CHANGE UP (ref 10.3–14.5)
RBC BLD AUTO: NORMAL — SIGNIFICANT CHANGE UP
SODIUM SERPL-SCNC: 141 MMOL/L
WBC # BLD: 6 K/UL — SIGNIFICANT CHANGE UP (ref 3.8–10.5)
WBC # FLD AUTO: 6 K/UL — SIGNIFICANT CHANGE UP (ref 3.8–10.5)

## 2019-06-26 PROCEDURE — 99215 OFFICE O/P EST HI 40 MIN: CPT

## 2019-07-01 ENCOUNTER — OUTPATIENT (OUTPATIENT)
Dept: OUTPATIENT SERVICES | Facility: HOSPITAL | Age: 59
LOS: 1 days | Discharge: HOME | End: 2019-07-01

## 2019-07-01 DIAGNOSIS — O03.9 COMPLETE OR UNSPECIFIED SPONTANEOUS ABORTION WITHOUT COMPLICATION: Chronic | ICD-10-CM

## 2019-07-01 DIAGNOSIS — Z85.118 PERSONAL HISTORY OF OTHER MALIGNANT NEOPLASM OF BRONCHUS AND LUNG: Chronic | ICD-10-CM

## 2019-07-01 DIAGNOSIS — C34.90 MALIGNANT NEOPLASM OF UNSPECIFIED PART OF UNSPECIFIED BRONCHUS OR LUNG: ICD-10-CM

## 2019-07-01 DIAGNOSIS — Z98.890 OTHER SPECIFIED POSTPROCEDURAL STATES: Chronic | ICD-10-CM

## 2019-07-05 ENCOUNTER — RESULT REVIEW (OUTPATIENT)
Age: 59
End: 2019-07-05

## 2019-07-05 ENCOUNTER — RX RENEWAL (OUTPATIENT)
Age: 59
End: 2019-07-05

## 2019-07-05 ENCOUNTER — APPOINTMENT (OUTPATIENT)
Dept: INFUSION THERAPY | Facility: HOSPITAL | Age: 59
End: 2019-07-05

## 2019-07-05 LAB
BASOPHILS # BLD AUTO: 0 K/UL — SIGNIFICANT CHANGE UP (ref 0–0.2)
BASOPHILS NFR BLD AUTO: 0.5 % — SIGNIFICANT CHANGE UP (ref 0–2)
EOSINOPHIL # BLD AUTO: 0.4 K/UL — SIGNIFICANT CHANGE UP (ref 0–0.5)
EOSINOPHIL NFR BLD AUTO: 3.7 % — SIGNIFICANT CHANGE UP (ref 0–6)
HCT VFR BLD CALC: 40.6 % — SIGNIFICANT CHANGE UP (ref 34.5–45)
HGB BLD-MCNC: 13.9 G/DL — SIGNIFICANT CHANGE UP (ref 11.5–15.5)
LYMPHOCYTES # BLD AUTO: 2.7 K/UL — SIGNIFICANT CHANGE UP (ref 1–3.3)
LYMPHOCYTES # BLD AUTO: 26.3 % — SIGNIFICANT CHANGE UP (ref 13–44)
MCHC RBC-ENTMCNC: 32.1 PG — SIGNIFICANT CHANGE UP (ref 27–34)
MCHC RBC-ENTMCNC: 34.2 G/DL — SIGNIFICANT CHANGE UP (ref 32–36)
MCV RBC AUTO: 93.7 FL — SIGNIFICANT CHANGE UP (ref 80–100)
MONOCYTES # BLD AUTO: 1.2 K/UL — HIGH (ref 0–0.9)
MONOCYTES NFR BLD AUTO: 11.9 % — SIGNIFICANT CHANGE UP (ref 2–14)
NEUTROPHILS # BLD AUTO: 5.9 K/UL — SIGNIFICANT CHANGE UP (ref 1.8–7.4)
NEUTROPHILS NFR BLD AUTO: 57.6 % — SIGNIFICANT CHANGE UP (ref 43–77)
PLATELET # BLD AUTO: 392 K/UL — SIGNIFICANT CHANGE UP (ref 150–400)
RBC # BLD: 4.34 M/UL — SIGNIFICANT CHANGE UP (ref 3.8–5.2)
RBC # FLD: 14.2 % — SIGNIFICANT CHANGE UP (ref 10.3–14.5)
WBC # BLD: 10.3 K/UL — SIGNIFICANT CHANGE UP (ref 3.8–10.5)
WBC # FLD AUTO: 10.3 K/UL — SIGNIFICANT CHANGE UP (ref 3.8–10.5)

## 2019-07-08 DIAGNOSIS — Z51.11 ENCOUNTER FOR ANTINEOPLASTIC CHEMOTHERAPY: ICD-10-CM

## 2019-07-08 DIAGNOSIS — R11.2 NAUSEA WITH VOMITING, UNSPECIFIED: ICD-10-CM

## 2019-07-11 DIAGNOSIS — E86.0 DEHYDRATION: ICD-10-CM

## 2019-07-14 ENCOUNTER — FORM ENCOUNTER (OUTPATIENT)
Age: 59
End: 2019-07-14

## 2019-07-15 ENCOUNTER — APPOINTMENT (OUTPATIENT)
Dept: CT IMAGING | Facility: IMAGING CENTER | Age: 59
End: 2019-07-15
Payer: COMMERCIAL

## 2019-07-15 ENCOUNTER — OUTPATIENT (OUTPATIENT)
Dept: OUTPATIENT SERVICES | Facility: HOSPITAL | Age: 59
LOS: 1 days | End: 2019-07-15
Payer: COMMERCIAL

## 2019-07-15 DIAGNOSIS — O03.9 COMPLETE OR UNSPECIFIED SPONTANEOUS ABORTION WITHOUT COMPLICATION: Chronic | ICD-10-CM

## 2019-07-15 DIAGNOSIS — C34.12 MALIGNANT NEOPLASM OF UPPER LOBE, LEFT BRONCHUS OR LUNG: ICD-10-CM

## 2019-07-15 DIAGNOSIS — Z85.118 PERSONAL HISTORY OF OTHER MALIGNANT NEOPLASM OF BRONCHUS AND LUNG: Chronic | ICD-10-CM

## 2019-07-15 DIAGNOSIS — Z98.890 OTHER SPECIFIED POSTPROCEDURAL STATES: Chronic | ICD-10-CM

## 2019-07-15 PROCEDURE — 71260 CT THORAX DX C+: CPT

## 2019-07-15 PROCEDURE — 71260 CT THORAX DX C+: CPT | Mod: 26

## 2019-07-17 ENCOUNTER — APPOINTMENT (OUTPATIENT)
Dept: HEMATOLOGY ONCOLOGY | Facility: CLINIC | Age: 59
End: 2019-07-17
Payer: COMMERCIAL

## 2019-07-17 ENCOUNTER — FORM ENCOUNTER (OUTPATIENT)
Age: 59
End: 2019-07-17

## 2019-07-17 VITALS
SYSTOLIC BLOOD PRESSURE: 130 MMHG | RESPIRATION RATE: 14 BRPM | WEIGHT: 162.04 LBS | DIASTOLIC BLOOD PRESSURE: 74 MMHG | HEART RATE: 110 BPM | BODY MASS INDEX: 28.7 KG/M2 | TEMPERATURE: 98.3 F | OXYGEN SATURATION: 96 %

## 2019-07-17 PROCEDURE — 99215 OFFICE O/P EST HI 40 MIN: CPT

## 2019-07-18 ENCOUNTER — APPOINTMENT (OUTPATIENT)
Dept: ULTRASOUND IMAGING | Facility: IMAGING CENTER | Age: 59
End: 2019-07-18
Payer: COMMERCIAL

## 2019-07-18 ENCOUNTER — OUTPATIENT (OUTPATIENT)
Dept: OUTPATIENT SERVICES | Facility: HOSPITAL | Age: 59
LOS: 1 days | End: 2019-07-18
Payer: COMMERCIAL

## 2019-07-18 DIAGNOSIS — Z85.118 PERSONAL HISTORY OF OTHER MALIGNANT NEOPLASM OF BRONCHUS AND LUNG: Chronic | ICD-10-CM

## 2019-07-18 DIAGNOSIS — C34.12 MALIGNANT NEOPLASM OF UPPER LOBE, LEFT BRONCHUS OR LUNG: ICD-10-CM

## 2019-07-18 DIAGNOSIS — Z98.890 OTHER SPECIFIED POSTPROCEDURAL STATES: Chronic | ICD-10-CM

## 2019-07-18 DIAGNOSIS — O03.9 COMPLETE OR UNSPECIFIED SPONTANEOUS ABORTION WITHOUT COMPLICATION: Chronic | ICD-10-CM

## 2019-07-18 PROCEDURE — 93971 EXTREMITY STUDY: CPT

## 2019-07-18 PROCEDURE — 93971 EXTREMITY STUDY: CPT | Mod: 26,LT

## 2019-07-24 ENCOUNTER — APPOINTMENT (OUTPATIENT)
Dept: HEMATOLOGY ONCOLOGY | Facility: CLINIC | Age: 59
End: 2019-07-24

## 2019-07-24 ENCOUNTER — RESULT REVIEW (OUTPATIENT)
Age: 59
End: 2019-07-24

## 2019-07-24 LAB
BASOPHILS # BLD AUTO: 0.1 K/UL — SIGNIFICANT CHANGE UP (ref 0–0.2)
BASOPHILS NFR BLD AUTO: 0.8 % — SIGNIFICANT CHANGE UP (ref 0–2)
EOSINOPHIL # BLD AUTO: 0.6 K/UL — HIGH (ref 0–0.5)
EOSINOPHIL NFR BLD AUTO: 5.5 % — SIGNIFICANT CHANGE UP (ref 0–6)
HCT VFR BLD CALC: 40.1 % — SIGNIFICANT CHANGE UP (ref 34.5–45)
HGB BLD-MCNC: 13.7 G/DL — SIGNIFICANT CHANGE UP (ref 11.5–15.5)
LYMPHOCYTES # BLD AUTO: 18.7 % — SIGNIFICANT CHANGE UP (ref 13–44)
LYMPHOCYTES # BLD AUTO: 2.2 K/UL — SIGNIFICANT CHANGE UP (ref 1–3.3)
MCHC RBC-ENTMCNC: 32.8 PG — SIGNIFICANT CHANGE UP (ref 27–34)
MCHC RBC-ENTMCNC: 34.2 G/DL — SIGNIFICANT CHANGE UP (ref 32–36)
MCV RBC AUTO: 95.8 FL — SIGNIFICANT CHANGE UP (ref 80–100)
MONOCYTES # BLD AUTO: 1.2 K/UL — HIGH (ref 0–0.9)
MONOCYTES NFR BLD AUTO: 10.6 % — SIGNIFICANT CHANGE UP (ref 2–14)
NEUTROPHILS # BLD AUTO: 7.4 K/UL — SIGNIFICANT CHANGE UP (ref 1.8–7.4)
NEUTROPHILS NFR BLD AUTO: 64.3 % — SIGNIFICANT CHANGE UP (ref 43–77)
PLATELET # BLD AUTO: 412 K/UL — HIGH (ref 150–400)
RBC # BLD: 4.19 M/UL — SIGNIFICANT CHANGE UP (ref 3.8–5.2)
RBC # FLD: 14.4 % — SIGNIFICANT CHANGE UP (ref 10.3–14.5)
WBC # BLD: 11.6 K/UL — HIGH (ref 3.8–10.5)
WBC # FLD AUTO: 11.6 K/UL — HIGH (ref 3.8–10.5)

## 2019-07-25 ENCOUNTER — LABORATORY RESULT (OUTPATIENT)
Age: 59
End: 2019-07-25

## 2019-07-25 ENCOUNTER — APPOINTMENT (OUTPATIENT)
Dept: INFUSION THERAPY | Facility: HOSPITAL | Age: 59
End: 2019-07-25

## 2019-08-12 ENCOUNTER — OUTPATIENT (OUTPATIENT)
Dept: OUTPATIENT SERVICES | Facility: HOSPITAL | Age: 59
LOS: 1 days | Discharge: HOME | End: 2019-08-12

## 2019-08-12 DIAGNOSIS — Z98.890 OTHER SPECIFIED POSTPROCEDURAL STATES: Chronic | ICD-10-CM

## 2019-08-12 DIAGNOSIS — C34.90 MALIGNANT NEOPLASM OF UNSPECIFIED PART OF UNSPECIFIED BRONCHUS OR LUNG: ICD-10-CM

## 2019-08-12 DIAGNOSIS — O03.9 COMPLETE OR UNSPECIFIED SPONTANEOUS ABORTION WITHOUT COMPLICATION: Chronic | ICD-10-CM

## 2019-08-12 DIAGNOSIS — Z85.118 PERSONAL HISTORY OF OTHER MALIGNANT NEOPLASM OF BRONCHUS AND LUNG: Chronic | ICD-10-CM

## 2019-08-14 ENCOUNTER — RESULT REVIEW (OUTPATIENT)
Age: 59
End: 2019-08-14

## 2019-08-14 ENCOUNTER — APPOINTMENT (OUTPATIENT)
Dept: INFUSION THERAPY | Facility: HOSPITAL | Age: 59
End: 2019-08-14

## 2019-08-14 ENCOUNTER — LABORATORY RESULT (OUTPATIENT)
Age: 59
End: 2019-08-14

## 2019-08-14 ENCOUNTER — APPOINTMENT (OUTPATIENT)
Dept: HEMATOLOGY ONCOLOGY | Facility: CLINIC | Age: 59
End: 2019-08-14
Payer: COMMERCIAL

## 2019-08-14 VITALS
RESPIRATION RATE: 16 BRPM | SYSTOLIC BLOOD PRESSURE: 130 MMHG | OXYGEN SATURATION: 98 % | WEIGHT: 160.94 LBS | HEART RATE: 85 BPM | BODY MASS INDEX: 28.51 KG/M2 | TEMPERATURE: 98 F | DIASTOLIC BLOOD PRESSURE: 70 MMHG

## 2019-08-14 LAB
BASOPHILS # BLD AUTO: 0.1 K/UL — SIGNIFICANT CHANGE UP (ref 0–0.2)
BASOPHILS NFR BLD AUTO: 0.8 % — SIGNIFICANT CHANGE UP (ref 0–2)
EOSINOPHIL # BLD AUTO: 0.4 K/UL — SIGNIFICANT CHANGE UP (ref 0–0.5)
EOSINOPHIL NFR BLD AUTO: 4.1 % — SIGNIFICANT CHANGE UP (ref 0–6)
HCT VFR BLD CALC: 39.1 % — SIGNIFICANT CHANGE UP (ref 34.5–45)
HGB BLD-MCNC: 13.6 G/DL — SIGNIFICANT CHANGE UP (ref 11.5–15.5)
LYMPHOCYTES # BLD AUTO: 19.1 % — SIGNIFICANT CHANGE UP (ref 13–44)
LYMPHOCYTES # BLD AUTO: 2 K/UL — SIGNIFICANT CHANGE UP (ref 1–3.3)
MCHC RBC-ENTMCNC: 34 PG — SIGNIFICANT CHANGE UP (ref 27–34)
MCHC RBC-ENTMCNC: 34.9 G/DL — SIGNIFICANT CHANGE UP (ref 32–36)
MCV RBC AUTO: 97.4 FL — SIGNIFICANT CHANGE UP (ref 80–100)
MONOCYTES # BLD AUTO: 1 K/UL — HIGH (ref 0–0.9)
MONOCYTES NFR BLD AUTO: 10 % — SIGNIFICANT CHANGE UP (ref 2–14)
NEUTROPHILS # BLD AUTO: 6.7 K/UL — SIGNIFICANT CHANGE UP (ref 1.8–7.4)
NEUTROPHILS NFR BLD AUTO: 66 % — SIGNIFICANT CHANGE UP (ref 43–77)
PLATELET # BLD AUTO: 438 K/UL — HIGH (ref 150–400)
RBC # BLD: 4.01 M/UL — SIGNIFICANT CHANGE UP (ref 3.8–5.2)
RBC # FLD: 14.2 % — SIGNIFICANT CHANGE UP (ref 10.3–14.5)
WBC # BLD: 10.2 K/UL — SIGNIFICANT CHANGE UP (ref 3.8–10.5)
WBC # FLD AUTO: 10.2 K/UL — SIGNIFICANT CHANGE UP (ref 3.8–10.5)

## 2019-08-14 PROCEDURE — 99215 OFFICE O/P EST HI 40 MIN: CPT

## 2019-08-15 DIAGNOSIS — R11.2 NAUSEA WITH VOMITING, UNSPECIFIED: ICD-10-CM

## 2019-08-15 DIAGNOSIS — Z51.11 ENCOUNTER FOR ANTINEOPLASTIC CHEMOTHERAPY: ICD-10-CM

## 2019-08-19 ENCOUNTER — EMERGENCY (EMERGENCY)
Facility: HOSPITAL | Age: 59
LOS: 1 days | Discharge: HOME | End: 2019-08-19
Attending: EMERGENCY MEDICINE | Admitting: EMERGENCY MEDICINE
Payer: COMMERCIAL

## 2019-08-19 ENCOUNTER — OTHER (OUTPATIENT)
Age: 59
End: 2019-08-19

## 2019-08-19 VITALS
RESPIRATION RATE: 18 BRPM | SYSTOLIC BLOOD PRESSURE: 157 MMHG | HEART RATE: 91 BPM | OXYGEN SATURATION: 98 % | TEMPERATURE: 99 F | DIASTOLIC BLOOD PRESSURE: 95 MMHG

## 2019-08-19 DIAGNOSIS — Z98.890 OTHER SPECIFIED POSTPROCEDURAL STATES: Chronic | ICD-10-CM

## 2019-08-19 DIAGNOSIS — O03.9 COMPLETE OR UNSPECIFIED SPONTANEOUS ABORTION WITHOUT COMPLICATION: Chronic | ICD-10-CM

## 2019-08-19 DIAGNOSIS — Z85.118 PERSONAL HISTORY OF OTHER MALIGNANT NEOPLASM OF BRONCHUS AND LUNG: Chronic | ICD-10-CM

## 2019-08-19 LAB
ALBUMIN SERPL ELPH-MCNC: 4.5 G/DL — SIGNIFICANT CHANGE UP (ref 3.3–5)
ALP SERPL-CCNC: 85 U/L — SIGNIFICANT CHANGE UP (ref 40–120)
ALT FLD-CCNC: 37 U/L — HIGH (ref 4–33)
ANION GAP SERPL CALC-SCNC: 14 MMO/L — SIGNIFICANT CHANGE UP (ref 7–14)
AST SERPL-CCNC: 33 U/L — HIGH (ref 4–32)
BASOPHILS # BLD AUTO: 0.11 K/UL — SIGNIFICANT CHANGE UP (ref 0–0.2)
BASOPHILS NFR BLD AUTO: 1.2 % — SIGNIFICANT CHANGE UP (ref 0–2)
BILIRUB SERPL-MCNC: 0.2 MG/DL — SIGNIFICANT CHANGE UP (ref 0.2–1.2)
BUN SERPL-MCNC: 9 MG/DL — SIGNIFICANT CHANGE UP (ref 7–23)
CALCIUM SERPL-MCNC: 10 MG/DL — SIGNIFICANT CHANGE UP (ref 8.4–10.5)
CHLORIDE SERPL-SCNC: 104 MMOL/L — SIGNIFICANT CHANGE UP (ref 98–107)
CO2 SERPL-SCNC: 22 MMOL/L — SIGNIFICANT CHANGE UP (ref 22–31)
CREAT SERPL-MCNC: 0.6 MG/DL — SIGNIFICANT CHANGE UP (ref 0.5–1.3)
EOSINOPHIL # BLD AUTO: 0.11 K/UL — SIGNIFICANT CHANGE UP (ref 0–0.5)
EOSINOPHIL NFR BLD AUTO: 1.2 % — SIGNIFICANT CHANGE UP (ref 0–6)
GLUCOSE SERPL-MCNC: 110 MG/DL — HIGH (ref 70–99)
HCT VFR BLD CALC: 40.5 % — SIGNIFICANT CHANGE UP (ref 34.5–45)
HGB BLD-MCNC: 13.3 G/DL — SIGNIFICANT CHANGE UP (ref 11.5–15.5)
IMM GRANULOCYTES NFR BLD AUTO: 0.3 % — SIGNIFICANT CHANGE UP (ref 0–1.5)
LYMPHOCYTES # BLD AUTO: 1.57 K/UL — SIGNIFICANT CHANGE UP (ref 1–3.3)
LYMPHOCYTES # BLD AUTO: 17.3 % — SIGNIFICANT CHANGE UP (ref 13–44)
MCHC RBC-ENTMCNC: 31.6 PG — SIGNIFICANT CHANGE UP (ref 27–34)
MCHC RBC-ENTMCNC: 32.8 % — SIGNIFICANT CHANGE UP (ref 32–36)
MCV RBC AUTO: 96.2 FL — SIGNIFICANT CHANGE UP (ref 80–100)
MONOCYTES # BLD AUTO: 0.95 K/UL — HIGH (ref 0–0.9)
MONOCYTES NFR BLD AUTO: 10.5 % — SIGNIFICANT CHANGE UP (ref 2–14)
NEUTROPHILS # BLD AUTO: 6.32 K/UL — SIGNIFICANT CHANGE UP (ref 1.8–7.4)
NEUTROPHILS NFR BLD AUTO: 69.5 % — SIGNIFICANT CHANGE UP (ref 43–77)
NRBC # FLD: 0 K/UL — SIGNIFICANT CHANGE UP (ref 0–0)
PLATELET # BLD AUTO: 344 K/UL — SIGNIFICANT CHANGE UP (ref 150–400)
PMV BLD: 9.7 FL — SIGNIFICANT CHANGE UP (ref 7–13)
POTASSIUM SERPL-MCNC: 3.6 MMOL/L — SIGNIFICANT CHANGE UP (ref 3.5–5.3)
POTASSIUM SERPL-SCNC: 3.6 MMOL/L — SIGNIFICANT CHANGE UP (ref 3.5–5.3)
PROT SERPL-MCNC: 8.7 G/DL — HIGH (ref 6–8.3)
RBC # BLD: 4.21 M/UL — SIGNIFICANT CHANGE UP (ref 3.8–5.2)
RBC # FLD: 14.6 % — HIGH (ref 10.3–14.5)
SODIUM SERPL-SCNC: 140 MMOL/L — SIGNIFICANT CHANGE UP (ref 135–145)
WBC # BLD: 9.09 K/UL — SIGNIFICANT CHANGE UP (ref 3.8–10.5)
WBC # FLD AUTO: 9.09 K/UL — SIGNIFICANT CHANGE UP (ref 3.8–10.5)

## 2019-08-19 PROCEDURE — 71045 X-RAY EXAM CHEST 1 VIEW: CPT | Mod: 26

## 2019-08-19 PROCEDURE — 99285 EMERGENCY DEPT VISIT HI MDM: CPT

## 2019-08-19 RX ORDER — ACETAMINOPHEN 500 MG
975 TABLET ORAL ONCE
Refills: 0 | Status: DISCONTINUED | OUTPATIENT
Start: 2019-08-19 | End: 2019-08-19

## 2019-08-19 RX ORDER — ACETAMINOPHEN 500 MG
650 TABLET ORAL ONCE
Refills: 0 | Status: COMPLETED | OUTPATIENT
Start: 2019-08-19 | End: 2019-08-19

## 2019-08-19 RX ADMIN — Medication 650 MILLIGRAM(S): at 11:55

## 2019-08-19 NOTE — ED PROVIDER NOTE - CLINICAL SUMMARY MEDICAL DECISION MAKING FREE TEXT BOX
60yo f pmhx lung ca on chemo pw coughing up bright red blood since saturday. asymptomatic at present, no chest pain, no weakness will get labs r/o anemia, cxr r/o pna vs pnx, vs wnl, exam wnl, sx 2/2 possibly due to advil use, suggested Tylenol

## 2019-08-19 NOTE — ED PROVIDER NOTE - NSFOLLOWUPINSTRUCTIONS_ED_ALL_ED_FT
1. TAKE ALL MEDICATIONS AS DIRECTED.    2. FOR PAIN OR FEVER YOU CAN TAKE ACETAMINOPHEN (TYLENOL) AS NEEDED, AS DIRECTED ON PACKAGING.  3. FOLLOW UP WITH YOUR ONCOLOGY DOCTOR WITHIN 5 DAYS AS DIRECTED.  4. IF YOU HAD LABS OR IMAGING DONE, YOU WERE GIVEN COPIES OF ALL LABS AND/OR IMAGING RESULTS FROM YOUR ER VISIT--PLEASE TAKE THEM WITH YOU TO YOUR FOLLOW UP APPOINTMENTS.  5. IF NEEDED, CALL PATIENT ACCESS SERVICES AT [phone] TO FIND A PRIMARY CARE PHYSICIAN.  OR CALL [phone] TO MAKE AN APPOINTMENT WITH THE CLINIC.  6. RETURN TO THE ER FOR ANY WORSENING SYMPTOMS OR CONCERNS.

## 2019-08-19 NOTE — ED ADULT NURSE NOTE - OBJECTIVE STATEMENT
Pt C/O 1 episode Saturday night of coughing up blood and another episode last night.  PMH lung cancer and is on chemo.  Denies CP/SOB, lightheadedness/dizziness, or any recent illnesses.  Appears well.  Labs obtained and sent as ordered.  #20g HEMAL BROWNING placed.  MD at bedside.

## 2019-08-19 NOTE — ED PROVIDER NOTE - OBJECTIVE STATEMENT
58yo f pmhx lung ca on chemo pw coughing up bright red blood since saturday. patient reports lung cancer on chemo last rx 5 days ago, now on Saturday having hemoptysis with small blood tinged sputum no other mucus or discharge. yesterday had sputum bright red, this morning no symptoms and no sx at present. Denies recent trauma, fevers, chills, headache, dizziness, nausea, vomiting, dysuria, freq, hematuria, diarrhea, constipation, chest pain, shortness of breath, cough. had this once before and blood was from sinuses, does report taking advil 6 pills daily for a while for msk pain, no hx of ulcers

## 2019-08-19 NOTE — ED PROVIDER NOTE - NS ED ROS FT

## 2019-08-19 NOTE — ED PROVIDER NOTE - NSFOLLOWUPCLINICS_GEN_ALL_ED_FT
[facility]  Hematology/Oncology  [address] [id number]  Phone: [phone]  Fax:   Follow Up Time: 4-6 Days

## 2019-08-19 NOTE — ED PROVIDER NOTE - ATTENDING CONTRIBUTION TO CARE
Bower: 59 yof with lung cancer. Pt noted 1 episode nausea and vomit with 1 episode 2 days ago and another last night, small amounts, no abd pain, no change in stool color, no black stools, no sob or chest pain. Pt has history of same with blood from upper airway. taking up to 8 advils daily. On exam, well appearing, not pale, oropharynx clear, no active bleeding, abd soft, clear lungs, nml cardiac. CXR, labs, will discuss with pt's onc.

## 2019-08-19 NOTE — ED ADULT TRIAGE NOTE - CHIEF COMPLAINT QUOTE
PT c/o cough with bright red blood x 2 days intermittently. Pt presently on chemo for Lung CA, last treatment 5 days ago.

## 2019-08-19 NOTE — ED PROVIDER NOTE - PROGRESS NOTE DETAILS
patient requesting tylenol for mild headaches and chronic body aches spoke with dr. vega (onc on call), discussed case with and agrees for optnt followup no further testing necessary tapan. Patient feels well, tolerating PO. Discussed lab and radiology findings with patient. Patient feels comfortable going home. Gave home care and follow up instructions. Discussed which symptoms to look out for and when to return to the ED for further evaluation. Patient given opportunity to ask questions about their medical condition and had all questions answered.

## 2019-09-04 ENCOUNTER — LABORATORY RESULT (OUTPATIENT)
Age: 59
End: 2019-09-04

## 2019-09-04 ENCOUNTER — APPOINTMENT (OUTPATIENT)
Dept: HEMATOLOGY ONCOLOGY | Facility: CLINIC | Age: 59
End: 2019-09-04
Payer: COMMERCIAL

## 2019-09-04 ENCOUNTER — APPOINTMENT (OUTPATIENT)
Dept: INFUSION THERAPY | Facility: HOSPITAL | Age: 59
End: 2019-09-04

## 2019-09-04 ENCOUNTER — RESULT REVIEW (OUTPATIENT)
Age: 59
End: 2019-09-04

## 2019-09-04 VITALS
OXYGEN SATURATION: 99 % | WEIGHT: 162.04 LBS | RESPIRATION RATE: 16 BRPM | TEMPERATURE: 98.2 F | HEART RATE: 86 BPM | DIASTOLIC BLOOD PRESSURE: 70 MMHG | BODY MASS INDEX: 28.7 KG/M2 | SYSTOLIC BLOOD PRESSURE: 120 MMHG

## 2019-09-04 LAB
BASOPHILS # BLD AUTO: 0.1 K/UL — SIGNIFICANT CHANGE UP (ref 0–0.2)
BASOPHILS NFR BLD AUTO: 0.7 % — SIGNIFICANT CHANGE UP (ref 0–2)
EOSINOPHIL # BLD AUTO: 0.5 K/UL — SIGNIFICANT CHANGE UP (ref 0–0.5)
EOSINOPHIL NFR BLD AUTO: 4.9 % — SIGNIFICANT CHANGE UP (ref 0–6)
HCT VFR BLD CALC: 41.4 % — SIGNIFICANT CHANGE UP (ref 34.5–45)
HGB BLD-MCNC: 14.1 G/DL — SIGNIFICANT CHANGE UP (ref 11.5–15.5)
LYMPHOCYTES # BLD AUTO: 2.6 K/UL — SIGNIFICANT CHANGE UP (ref 1–3.3)
LYMPHOCYTES # BLD AUTO: 25.3 % — SIGNIFICANT CHANGE UP (ref 13–44)
MCHC RBC-ENTMCNC: 33.7 PG — SIGNIFICANT CHANGE UP (ref 27–34)
MCHC RBC-ENTMCNC: 34 G/DL — SIGNIFICANT CHANGE UP (ref 32–36)
MCV RBC AUTO: 99.1 FL — SIGNIFICANT CHANGE UP (ref 80–100)
MONOCYTES # BLD AUTO: 0.9 K/UL — SIGNIFICANT CHANGE UP (ref 0–0.9)
MONOCYTES NFR BLD AUTO: 9.3 % — SIGNIFICANT CHANGE UP (ref 2–14)
NEUTROPHILS # BLD AUTO: 6.1 K/UL — SIGNIFICANT CHANGE UP (ref 1.8–7.4)
NEUTROPHILS NFR BLD AUTO: 59.7 % — SIGNIFICANT CHANGE UP (ref 43–77)
PLATELET # BLD AUTO: 316 K/UL — SIGNIFICANT CHANGE UP (ref 150–400)
RBC # BLD: 4.18 M/UL — SIGNIFICANT CHANGE UP (ref 3.8–5.2)
RBC # FLD: 13.6 % — SIGNIFICANT CHANGE UP (ref 10.3–14.5)
WBC # BLD: 10.2 K/UL — SIGNIFICANT CHANGE UP (ref 3.8–10.5)
WBC # FLD AUTO: 10.2 K/UL — SIGNIFICANT CHANGE UP (ref 3.8–10.5)

## 2019-09-04 PROCEDURE — 99214 OFFICE O/P EST MOD 30 MIN: CPT

## 2019-09-22 ENCOUNTER — FORM ENCOUNTER (OUTPATIENT)
Age: 59
End: 2019-09-22

## 2019-09-23 ENCOUNTER — OUTPATIENT (OUTPATIENT)
Dept: OUTPATIENT SERVICES | Facility: HOSPITAL | Age: 59
LOS: 1 days | Discharge: HOME | End: 2019-09-23

## 2019-09-23 ENCOUNTER — APPOINTMENT (OUTPATIENT)
Dept: CT IMAGING | Facility: IMAGING CENTER | Age: 59
End: 2019-09-23
Payer: COMMERCIAL

## 2019-09-23 ENCOUNTER — OUTPATIENT (OUTPATIENT)
Dept: OUTPATIENT SERVICES | Facility: HOSPITAL | Age: 59
LOS: 1 days | End: 2019-09-23
Payer: COMMERCIAL

## 2019-09-23 DIAGNOSIS — Z98.890 OTHER SPECIFIED POSTPROCEDURAL STATES: Chronic | ICD-10-CM

## 2019-09-23 DIAGNOSIS — Z85.118 PERSONAL HISTORY OF OTHER MALIGNANT NEOPLASM OF BRONCHUS AND LUNG: Chronic | ICD-10-CM

## 2019-09-23 DIAGNOSIS — O03.9 COMPLETE OR UNSPECIFIED SPONTANEOUS ABORTION WITHOUT COMPLICATION: Chronic | ICD-10-CM

## 2019-09-23 DIAGNOSIS — C34.12 MALIGNANT NEOPLASM OF UPPER LOBE, LEFT BRONCHUS OR LUNG: ICD-10-CM

## 2019-09-23 DIAGNOSIS — C34.90 MALIGNANT NEOPLASM OF UNSPECIFIED PART OF UNSPECIFIED BRONCHUS OR LUNG: ICD-10-CM

## 2019-09-23 PROCEDURE — 71260 CT THORAX DX C+: CPT

## 2019-09-23 PROCEDURE — 71260 CT THORAX DX C+: CPT | Mod: 26

## 2019-09-25 ENCOUNTER — RESULT REVIEW (OUTPATIENT)
Age: 59
End: 2019-09-25

## 2019-09-25 ENCOUNTER — APPOINTMENT (OUTPATIENT)
Dept: HEMATOLOGY ONCOLOGY | Facility: CLINIC | Age: 59
End: 2019-09-25
Payer: COMMERCIAL

## 2019-09-25 ENCOUNTER — APPOINTMENT (OUTPATIENT)
Dept: INFUSION THERAPY | Facility: HOSPITAL | Age: 59
End: 2019-09-25

## 2019-09-25 ENCOUNTER — LABORATORY RESULT (OUTPATIENT)
Age: 59
End: 2019-09-25

## 2019-09-25 VITALS
OXYGEN SATURATION: 99 % | HEART RATE: 82 BPM | RESPIRATION RATE: 16 BRPM | DIASTOLIC BLOOD PRESSURE: 70 MMHG | TEMPERATURE: 98.2 F | SYSTOLIC BLOOD PRESSURE: 120 MMHG | WEIGHT: 163.14 LBS | BODY MASS INDEX: 28.9 KG/M2

## 2019-09-25 LAB
BASOPHILS # BLD AUTO: 0.1 K/UL — SIGNIFICANT CHANGE UP (ref 0–0.2)
BASOPHILS NFR BLD AUTO: 1 % — SIGNIFICANT CHANGE UP (ref 0–2)
EOSINOPHIL # BLD AUTO: 0.5 K/UL — SIGNIFICANT CHANGE UP (ref 0–0.5)
EOSINOPHIL NFR BLD AUTO: 4.7 % — SIGNIFICANT CHANGE UP (ref 0–6)
HCT VFR BLD CALC: 41.5 % — SIGNIFICANT CHANGE UP (ref 34.5–45)
HGB BLD-MCNC: 14.4 G/DL — SIGNIFICANT CHANGE UP (ref 11.5–15.5)
LYMPHOCYTES # BLD AUTO: 2.4 K/UL — SIGNIFICANT CHANGE UP (ref 1–3.3)
LYMPHOCYTES # BLD AUTO: 24.3 % — SIGNIFICANT CHANGE UP (ref 13–44)
MCHC RBC-ENTMCNC: 34.3 PG — HIGH (ref 27–34)
MCHC RBC-ENTMCNC: 34.7 G/DL — SIGNIFICANT CHANGE UP (ref 32–36)
MCV RBC AUTO: 98.8 FL — SIGNIFICANT CHANGE UP (ref 80–100)
MONOCYTES # BLD AUTO: 1.2 K/UL — HIGH (ref 0–0.9)
MONOCYTES NFR BLD AUTO: 12.2 % — SIGNIFICANT CHANGE UP (ref 2–14)
NEUTROPHILS # BLD AUTO: 5.6 K/UL — SIGNIFICANT CHANGE UP (ref 1.8–7.4)
NEUTROPHILS NFR BLD AUTO: 57.8 % — SIGNIFICANT CHANGE UP (ref 43–77)
PLATELET # BLD AUTO: 340 K/UL — SIGNIFICANT CHANGE UP (ref 150–400)
RBC # BLD: 4.2 M/UL — SIGNIFICANT CHANGE UP (ref 3.8–5.2)
RBC # FLD: 13.2 % — SIGNIFICANT CHANGE UP (ref 10.3–14.5)
WBC # BLD: 9.8 K/UL — SIGNIFICANT CHANGE UP (ref 3.8–10.5)
WBC # FLD AUTO: 9.8 K/UL — SIGNIFICANT CHANGE UP (ref 3.8–10.5)

## 2019-09-25 PROCEDURE — 99214 OFFICE O/P EST MOD 30 MIN: CPT

## 2019-09-26 DIAGNOSIS — Z51.11 ENCOUNTER FOR ANTINEOPLASTIC CHEMOTHERAPY: ICD-10-CM

## 2019-09-26 DIAGNOSIS — R11.2 NAUSEA WITH VOMITING, UNSPECIFIED: ICD-10-CM

## 2019-10-16 ENCOUNTER — LABORATORY RESULT (OUTPATIENT)
Age: 59
End: 2019-10-16

## 2019-10-16 ENCOUNTER — RESULT REVIEW (OUTPATIENT)
Age: 59
End: 2019-10-16

## 2019-10-16 ENCOUNTER — APPOINTMENT (OUTPATIENT)
Dept: HEMATOLOGY ONCOLOGY | Facility: CLINIC | Age: 59
End: 2019-10-16
Payer: COMMERCIAL

## 2019-10-16 ENCOUNTER — APPOINTMENT (OUTPATIENT)
Dept: INFUSION THERAPY | Facility: HOSPITAL | Age: 59
End: 2019-10-16

## 2019-10-16 VITALS
DIASTOLIC BLOOD PRESSURE: 86 MMHG | SYSTOLIC BLOOD PRESSURE: 142 MMHG | BODY MASS INDEX: 28.82 KG/M2 | RESPIRATION RATE: 18 BRPM | OXYGEN SATURATION: 95 % | HEART RATE: 85 BPM | TEMPERATURE: 98.3 F | WEIGHT: 162.7 LBS

## 2019-10-16 LAB
BASOPHILS # BLD AUTO: 0.1 K/UL — SIGNIFICANT CHANGE UP (ref 0–0.2)
BASOPHILS NFR BLD AUTO: 0.9 % — SIGNIFICANT CHANGE UP (ref 0–2)
EOSINOPHIL # BLD AUTO: 0.4 K/UL — SIGNIFICANT CHANGE UP (ref 0–0.5)
EOSINOPHIL NFR BLD AUTO: 4.1 % — SIGNIFICANT CHANGE UP (ref 0–6)
HCT VFR BLD CALC: 42.7 % — SIGNIFICANT CHANGE UP (ref 34.5–45)
HGB BLD-MCNC: 14.8 G/DL — SIGNIFICANT CHANGE UP (ref 11.5–15.5)
LYMPHOCYTES # BLD AUTO: 2.4 K/UL — SIGNIFICANT CHANGE UP (ref 1–3.3)
LYMPHOCYTES # BLD AUTO: 26.7 % — SIGNIFICANT CHANGE UP (ref 13–44)
MCHC RBC-ENTMCNC: 33.7 PG — SIGNIFICANT CHANGE UP (ref 27–34)
MCHC RBC-ENTMCNC: 34.8 G/DL — SIGNIFICANT CHANGE UP (ref 32–36)
MCV RBC AUTO: 97 FL — SIGNIFICANT CHANGE UP (ref 80–100)
MONOCYTES # BLD AUTO: 1 K/UL — HIGH (ref 0–0.9)
MONOCYTES NFR BLD AUTO: 11.5 % — SIGNIFICANT CHANGE UP (ref 2–14)
NEUTROPHILS # BLD AUTO: 5.2 K/UL — SIGNIFICANT CHANGE UP (ref 1.8–7.4)
NEUTROPHILS NFR BLD AUTO: 56.9 % — SIGNIFICANT CHANGE UP (ref 43–77)
PLATELET # BLD AUTO: 375 K/UL — SIGNIFICANT CHANGE UP (ref 150–400)
RBC # BLD: 4.4 M/UL — SIGNIFICANT CHANGE UP (ref 3.8–5.2)
RBC # FLD: 12.6 % — SIGNIFICANT CHANGE UP (ref 10.3–14.5)
WBC # BLD: 9.1 K/UL — SIGNIFICANT CHANGE UP (ref 3.8–10.5)
WBC # FLD AUTO: 9.1 K/UL — SIGNIFICANT CHANGE UP (ref 3.8–10.5)

## 2019-10-16 PROCEDURE — 99213 OFFICE O/P EST LOW 20 MIN: CPT

## 2019-10-23 ENCOUNTER — OUTPATIENT (OUTPATIENT)
Dept: OUTPATIENT SERVICES | Facility: HOSPITAL | Age: 59
LOS: 1 days | Discharge: HOME | End: 2019-10-23

## 2019-10-23 DIAGNOSIS — Z98.890 OTHER SPECIFIED POSTPROCEDURAL STATES: Chronic | ICD-10-CM

## 2019-10-23 DIAGNOSIS — Z85.118 PERSONAL HISTORY OF OTHER MALIGNANT NEOPLASM OF BRONCHUS AND LUNG: Chronic | ICD-10-CM

## 2019-10-23 DIAGNOSIS — O03.9 COMPLETE OR UNSPECIFIED SPONTANEOUS ABORTION WITHOUT COMPLICATION: Chronic | ICD-10-CM

## 2019-10-23 DIAGNOSIS — C34.90 MALIGNANT NEOPLASM OF UNSPECIFIED PART OF UNSPECIFIED BRONCHUS OR LUNG: ICD-10-CM

## 2019-11-06 ENCOUNTER — APPOINTMENT (OUTPATIENT)
Dept: INFUSION THERAPY | Facility: HOSPITAL | Age: 59
End: 2019-11-06

## 2019-11-06 ENCOUNTER — APPOINTMENT (OUTPATIENT)
Dept: HEMATOLOGY ONCOLOGY | Facility: CLINIC | Age: 59
End: 2019-11-06

## 2019-11-22 ENCOUNTER — OUTPATIENT (OUTPATIENT)
Dept: OUTPATIENT SERVICES | Facility: HOSPITAL | Age: 59
LOS: 1 days | Discharge: HOME | End: 2019-11-22

## 2019-11-22 DIAGNOSIS — Z85.118 PERSONAL HISTORY OF OTHER MALIGNANT NEOPLASM OF BRONCHUS AND LUNG: Chronic | ICD-10-CM

## 2019-11-22 DIAGNOSIS — O03.9 COMPLETE OR UNSPECIFIED SPONTANEOUS ABORTION WITHOUT COMPLICATION: Chronic | ICD-10-CM

## 2019-11-22 DIAGNOSIS — C34.90 MALIGNANT NEOPLASM OF UNSPECIFIED PART OF UNSPECIFIED BRONCHUS OR LUNG: ICD-10-CM

## 2019-11-22 DIAGNOSIS — Z98.890 OTHER SPECIFIED POSTPROCEDURAL STATES: Chronic | ICD-10-CM

## 2019-11-29 ENCOUNTER — LABORATORY RESULT (OUTPATIENT)
Age: 59
End: 2019-11-29

## 2019-11-29 ENCOUNTER — APPOINTMENT (OUTPATIENT)
Dept: CT IMAGING | Facility: IMAGING CENTER | Age: 59
End: 2019-11-29
Payer: COMMERCIAL

## 2019-11-29 ENCOUNTER — APPOINTMENT (OUTPATIENT)
Dept: HEMATOLOGY ONCOLOGY | Facility: CLINIC | Age: 59
End: 2019-11-29
Payer: COMMERCIAL

## 2019-11-29 ENCOUNTER — APPOINTMENT (OUTPATIENT)
Dept: INFUSION THERAPY | Facility: HOSPITAL | Age: 59
End: 2019-11-29

## 2019-11-29 ENCOUNTER — RESULT REVIEW (OUTPATIENT)
Age: 59
End: 2019-11-29

## 2019-11-29 ENCOUNTER — OUTPATIENT (OUTPATIENT)
Dept: OUTPATIENT SERVICES | Facility: HOSPITAL | Age: 59
LOS: 1 days | End: 2019-11-29
Payer: COMMERCIAL

## 2019-11-29 VITALS
RESPIRATION RATE: 18 BRPM | DIASTOLIC BLOOD PRESSURE: 85 MMHG | HEART RATE: 80 BPM | SYSTOLIC BLOOD PRESSURE: 138 MMHG | WEIGHT: 163.8 LBS | TEMPERATURE: 97.6 F | BODY MASS INDEX: 29.02 KG/M2 | OXYGEN SATURATION: 97 %

## 2019-11-29 DIAGNOSIS — Z98.890 OTHER SPECIFIED POSTPROCEDURAL STATES: Chronic | ICD-10-CM

## 2019-11-29 DIAGNOSIS — O03.9 COMPLETE OR UNSPECIFIED SPONTANEOUS ABORTION WITHOUT COMPLICATION: Chronic | ICD-10-CM

## 2019-11-29 DIAGNOSIS — Z85.118 PERSONAL HISTORY OF OTHER MALIGNANT NEOPLASM OF BRONCHUS AND LUNG: Chronic | ICD-10-CM

## 2019-11-29 DIAGNOSIS — C34.12 MALIGNANT NEOPLASM OF UPPER LOBE, LEFT BRONCHUS OR LUNG: ICD-10-CM

## 2019-11-29 LAB
BASOPHILS # BLD AUTO: 0.1 K/UL — SIGNIFICANT CHANGE UP (ref 0–0.2)
BASOPHILS NFR BLD AUTO: 1.1 % — SIGNIFICANT CHANGE UP (ref 0–2)
EOSINOPHIL # BLD AUTO: 0.5 K/UL — SIGNIFICANT CHANGE UP (ref 0–0.5)
EOSINOPHIL NFR BLD AUTO: 5 % — SIGNIFICANT CHANGE UP (ref 0–6)
HCT VFR BLD CALC: 41.6 % — SIGNIFICANT CHANGE UP (ref 34.5–45)
HGB BLD-MCNC: 14.5 G/DL — SIGNIFICANT CHANGE UP (ref 11.5–15.5)
LYMPHOCYTES # BLD AUTO: 2.7 K/UL — SIGNIFICANT CHANGE UP (ref 1–3.3)
LYMPHOCYTES # BLD AUTO: 27.6 % — SIGNIFICANT CHANGE UP (ref 13–44)
MCHC RBC-ENTMCNC: 33.9 PG — SIGNIFICANT CHANGE UP (ref 27–34)
MCHC RBC-ENTMCNC: 34.9 G/DL — SIGNIFICANT CHANGE UP (ref 32–36)
MCV RBC AUTO: 97.1 FL — SIGNIFICANT CHANGE UP (ref 80–100)
MONOCYTES # BLD AUTO: 0.9 K/UL — SIGNIFICANT CHANGE UP (ref 0–0.9)
MONOCYTES NFR BLD AUTO: 8.8 % — SIGNIFICANT CHANGE UP (ref 2–14)
NEUTROPHILS # BLD AUTO: 5.6 K/UL — SIGNIFICANT CHANGE UP (ref 1.8–7.4)
NEUTROPHILS NFR BLD AUTO: 57.6 % — SIGNIFICANT CHANGE UP (ref 43–77)
PLATELET # BLD AUTO: 299 K/UL — SIGNIFICANT CHANGE UP (ref 150–400)
RBC # BLD: 4.29 M/UL — SIGNIFICANT CHANGE UP (ref 3.8–5.2)
RBC # FLD: 12.4 % — SIGNIFICANT CHANGE UP (ref 10.3–14.5)
WBC # BLD: 9.7 K/UL — SIGNIFICANT CHANGE UP (ref 3.8–10.5)
WBC # FLD AUTO: 9.7 K/UL — SIGNIFICANT CHANGE UP (ref 3.8–10.5)

## 2019-11-29 PROCEDURE — 71260 CT THORAX DX C+: CPT | Mod: 26

## 2019-11-29 PROCEDURE — 99214 OFFICE O/P EST MOD 30 MIN: CPT

## 2019-11-29 PROCEDURE — 71260 CT THORAX DX C+: CPT

## 2019-12-02 DIAGNOSIS — R11.2 NAUSEA WITH VOMITING, UNSPECIFIED: ICD-10-CM

## 2019-12-02 DIAGNOSIS — Z51.11 ENCOUNTER FOR ANTINEOPLASTIC CHEMOTHERAPY: ICD-10-CM

## 2019-12-04 ENCOUNTER — APPOINTMENT (OUTPATIENT)
Dept: HEMATOLOGY ONCOLOGY | Facility: CLINIC | Age: 59
End: 2019-12-04
Payer: COMMERCIAL

## 2019-12-04 VITALS
DIASTOLIC BLOOD PRESSURE: 74 MMHG | WEIGHT: 163.14 LBS | SYSTOLIC BLOOD PRESSURE: 130 MMHG | OXYGEN SATURATION: 93 % | TEMPERATURE: 98 F | BODY MASS INDEX: 28.9 KG/M2 | RESPIRATION RATE: 18 BRPM | HEART RATE: 91 BPM

## 2019-12-04 PROCEDURE — 99214 OFFICE O/P EST MOD 30 MIN: CPT

## 2019-12-18 ENCOUNTER — RESULT REVIEW (OUTPATIENT)
Age: 59
End: 2019-12-18

## 2019-12-18 ENCOUNTER — APPOINTMENT (OUTPATIENT)
Dept: INFUSION THERAPY | Facility: HOSPITAL | Age: 59
End: 2019-12-18

## 2019-12-18 ENCOUNTER — APPOINTMENT (OUTPATIENT)
Dept: HEMATOLOGY ONCOLOGY | Facility: CLINIC | Age: 59
End: 2019-12-18
Payer: COMMERCIAL

## 2019-12-18 ENCOUNTER — LABORATORY RESULT (OUTPATIENT)
Age: 59
End: 2019-12-18

## 2019-12-18 VITALS
DIASTOLIC BLOOD PRESSURE: 86 MMHG | WEIGHT: 164.88 LBS | OXYGEN SATURATION: 94 % | SYSTOLIC BLOOD PRESSURE: 140 MMHG | BODY MASS INDEX: 29.21 KG/M2 | HEART RATE: 92 BPM | RESPIRATION RATE: 14 BRPM | TEMPERATURE: 98.2 F

## 2019-12-18 LAB
BASOPHILS # BLD AUTO: 0.1 K/UL — SIGNIFICANT CHANGE UP (ref 0–0.2)
BASOPHILS NFR BLD AUTO: 0.7 % — SIGNIFICANT CHANGE UP (ref 0–2)
EOSINOPHIL # BLD AUTO: 0.5 K/UL — SIGNIFICANT CHANGE UP (ref 0–0.5)
EOSINOPHIL NFR BLD AUTO: 4.9 % — SIGNIFICANT CHANGE UP (ref 0–6)
HCT VFR BLD CALC: 42.7 % — SIGNIFICANT CHANGE UP (ref 34.5–45)
HGB BLD-MCNC: 14.6 G/DL — SIGNIFICANT CHANGE UP (ref 11.5–15.5)
LYMPHOCYTES # BLD AUTO: 2.4 K/UL — SIGNIFICANT CHANGE UP (ref 1–3.3)
LYMPHOCYTES # BLD AUTO: 22.9 % — SIGNIFICANT CHANGE UP (ref 13–44)
MCHC RBC-ENTMCNC: 33.1 PG — SIGNIFICANT CHANGE UP (ref 27–34)
MCHC RBC-ENTMCNC: 34.1 G/DL — SIGNIFICANT CHANGE UP (ref 32–36)
MCV RBC AUTO: 96.9 FL — SIGNIFICANT CHANGE UP (ref 80–100)
MONOCYTES # BLD AUTO: 1.2 K/UL — HIGH (ref 0–0.9)
MONOCYTES NFR BLD AUTO: 11.9 % — SIGNIFICANT CHANGE UP (ref 2–14)
NEUTROPHILS # BLD AUTO: 6.3 K/UL — SIGNIFICANT CHANGE UP (ref 1.8–7.4)
NEUTROPHILS NFR BLD AUTO: 59.6 % — SIGNIFICANT CHANGE UP (ref 43–77)
PLATELET # BLD AUTO: 356 K/UL — SIGNIFICANT CHANGE UP (ref 150–400)
RBC # BLD: 4.4 M/UL — SIGNIFICANT CHANGE UP (ref 3.8–5.2)
RBC # FLD: 12.4 % — SIGNIFICANT CHANGE UP (ref 10.3–14.5)
WBC # BLD: 10.5 K/UL — SIGNIFICANT CHANGE UP (ref 3.8–10.5)
WBC # FLD AUTO: 10.5 K/UL — SIGNIFICANT CHANGE UP (ref 3.8–10.5)

## 2019-12-18 PROCEDURE — 99214 OFFICE O/P EST MOD 30 MIN: CPT

## 2020-01-06 ENCOUNTER — OUTPATIENT (OUTPATIENT)
Dept: OUTPATIENT SERVICES | Facility: HOSPITAL | Age: 60
LOS: 1 days | Discharge: HOME | End: 2020-01-06

## 2020-01-06 DIAGNOSIS — Z98.890 OTHER SPECIFIED POSTPROCEDURAL STATES: Chronic | ICD-10-CM

## 2020-01-06 DIAGNOSIS — Z85.118 PERSONAL HISTORY OF OTHER MALIGNANT NEOPLASM OF BRONCHUS AND LUNG: Chronic | ICD-10-CM

## 2020-01-06 DIAGNOSIS — O03.9 COMPLETE OR UNSPECIFIED SPONTANEOUS ABORTION WITHOUT COMPLICATION: Chronic | ICD-10-CM

## 2020-01-06 DIAGNOSIS — C34.90 MALIGNANT NEOPLASM OF UNSPECIFIED PART OF UNSPECIFIED BRONCHUS OR LUNG: ICD-10-CM

## 2020-01-08 ENCOUNTER — LABORATORY RESULT (OUTPATIENT)
Age: 60
End: 2020-01-08

## 2020-01-08 ENCOUNTER — RESULT REVIEW (OUTPATIENT)
Age: 60
End: 2020-01-08

## 2020-01-08 ENCOUNTER — APPOINTMENT (OUTPATIENT)
Dept: HEMATOLOGY ONCOLOGY | Facility: CLINIC | Age: 60
End: 2020-01-08
Payer: COMMERCIAL

## 2020-01-08 ENCOUNTER — APPOINTMENT (OUTPATIENT)
Dept: INFUSION THERAPY | Facility: HOSPITAL | Age: 60
End: 2020-01-08

## 2020-01-08 VITALS
DIASTOLIC BLOOD PRESSURE: 80 MMHG | SYSTOLIC BLOOD PRESSURE: 130 MMHG | WEIGHT: 165.35 LBS | BODY MASS INDEX: 29.29 KG/M2 | HEART RATE: 104 BPM | OXYGEN SATURATION: 96 % | TEMPERATURE: 98.3 F | RESPIRATION RATE: 16 BRPM

## 2020-01-08 LAB
BASOPHILS # BLD AUTO: 0.1 K/UL — SIGNIFICANT CHANGE UP (ref 0–0.2)
BASOPHILS NFR BLD AUTO: 0.6 % — SIGNIFICANT CHANGE UP (ref 0–2)
EOSINOPHIL # BLD AUTO: 0.5 K/UL — SIGNIFICANT CHANGE UP (ref 0–0.5)
EOSINOPHIL NFR BLD AUTO: 4.5 % — SIGNIFICANT CHANGE UP (ref 0–6)
HCT VFR BLD CALC: 41.6 % — SIGNIFICANT CHANGE UP (ref 34.5–45)
HGB BLD-MCNC: 14.4 G/DL — SIGNIFICANT CHANGE UP (ref 11.5–15.5)
LYMPHOCYTES # BLD AUTO: 2.1 K/UL — SIGNIFICANT CHANGE UP (ref 1–3.3)
LYMPHOCYTES # BLD AUTO: 20.9 % — SIGNIFICANT CHANGE UP (ref 13–44)
MCHC RBC-ENTMCNC: 33.8 PG — SIGNIFICANT CHANGE UP (ref 27–34)
MCHC RBC-ENTMCNC: 34.6 G/DL — SIGNIFICANT CHANGE UP (ref 32–36)
MCV RBC AUTO: 97.7 FL — SIGNIFICANT CHANGE UP (ref 80–100)
MONOCYTES # BLD AUTO: 1 K/UL — HIGH (ref 0–0.9)
MONOCYTES NFR BLD AUTO: 10 % — SIGNIFICANT CHANGE UP (ref 2–14)
NEUTROPHILS # BLD AUTO: 6.5 K/UL — SIGNIFICANT CHANGE UP (ref 1.8–7.4)
NEUTROPHILS NFR BLD AUTO: 64 % — SIGNIFICANT CHANGE UP (ref 43–77)
PLATELET # BLD AUTO: 337 K/UL — SIGNIFICANT CHANGE UP (ref 150–400)
RBC # BLD: 4.26 M/UL — SIGNIFICANT CHANGE UP (ref 3.8–5.2)
RBC # FLD: 12.9 % — SIGNIFICANT CHANGE UP (ref 10.3–14.5)
WBC # BLD: 10.1 K/UL — SIGNIFICANT CHANGE UP (ref 3.8–10.5)
WBC # FLD AUTO: 10.1 K/UL — SIGNIFICANT CHANGE UP (ref 3.8–10.5)

## 2020-01-08 PROCEDURE — 99213 OFFICE O/P EST LOW 20 MIN: CPT

## 2020-01-09 DIAGNOSIS — Z51.11 ENCOUNTER FOR ANTINEOPLASTIC CHEMOTHERAPY: ICD-10-CM

## 2020-01-09 DIAGNOSIS — R11.2 NAUSEA WITH VOMITING, UNSPECIFIED: ICD-10-CM

## 2020-01-14 ENCOUNTER — APPOINTMENT (OUTPATIENT)
Dept: MRI IMAGING | Facility: CLINIC | Age: 60
End: 2020-01-14
Payer: COMMERCIAL

## 2020-01-14 ENCOUNTER — OUTPATIENT (OUTPATIENT)
Dept: OUTPATIENT SERVICES | Facility: HOSPITAL | Age: 60
LOS: 1 days | End: 2020-01-14
Payer: COMMERCIAL

## 2020-01-14 DIAGNOSIS — Z98.890 OTHER SPECIFIED POSTPROCEDURAL STATES: Chronic | ICD-10-CM

## 2020-01-14 DIAGNOSIS — C34.12 MALIGNANT NEOPLASM OF UPPER LOBE, LEFT BRONCHUS OR LUNG: ICD-10-CM

## 2020-01-14 DIAGNOSIS — Z85.118 PERSONAL HISTORY OF OTHER MALIGNANT NEOPLASM OF BRONCHUS AND LUNG: Chronic | ICD-10-CM

## 2020-01-14 DIAGNOSIS — O03.9 COMPLETE OR UNSPECIFIED SPONTANEOUS ABORTION WITHOUT COMPLICATION: Chronic | ICD-10-CM

## 2020-01-14 PROCEDURE — A9585: CPT

## 2020-01-14 PROCEDURE — 70553 MRI BRAIN STEM W/O & W/DYE: CPT

## 2020-01-14 PROCEDURE — 70553 MRI BRAIN STEM W/O & W/DYE: CPT | Mod: 26

## 2020-01-29 ENCOUNTER — LABORATORY RESULT (OUTPATIENT)
Age: 60
End: 2020-01-29

## 2020-01-29 ENCOUNTER — RESULT REVIEW (OUTPATIENT)
Age: 60
End: 2020-01-29

## 2020-01-29 ENCOUNTER — APPOINTMENT (OUTPATIENT)
Dept: HEMATOLOGY ONCOLOGY | Facility: CLINIC | Age: 60
End: 2020-01-29
Payer: COMMERCIAL

## 2020-01-29 ENCOUNTER — APPOINTMENT (OUTPATIENT)
Dept: INFUSION THERAPY | Facility: HOSPITAL | Age: 60
End: 2020-01-29

## 2020-01-29 VITALS
DIASTOLIC BLOOD PRESSURE: 80 MMHG | SYSTOLIC BLOOD PRESSURE: 130 MMHG | RESPIRATION RATE: 14 BRPM | TEMPERATURE: 98 F | WEIGHT: 167.55 LBS | OXYGEN SATURATION: 99 % | BODY MASS INDEX: 29.68 KG/M2 | HEART RATE: 84 BPM

## 2020-01-29 LAB
BASOPHILS # BLD AUTO: 0.1 K/UL — SIGNIFICANT CHANGE UP (ref 0–0.2)
BASOPHILS NFR BLD AUTO: 0.9 % — SIGNIFICANT CHANGE UP (ref 0–2)
EOSINOPHIL # BLD AUTO: 0.7 K/UL — HIGH (ref 0–0.5)
EOSINOPHIL NFR BLD AUTO: 6.2 % — HIGH (ref 0–6)
HCT VFR BLD CALC: 42.4 % — SIGNIFICANT CHANGE UP (ref 34.5–45)
HGB BLD-MCNC: 14.6 G/DL — SIGNIFICANT CHANGE UP (ref 11.5–15.5)
LYMPHOCYTES # BLD AUTO: 2.5 K/UL — SIGNIFICANT CHANGE UP (ref 1–3.3)
LYMPHOCYTES # BLD AUTO: 22.9 % — SIGNIFICANT CHANGE UP (ref 13–44)
MCHC RBC-ENTMCNC: 33.4 PG — SIGNIFICANT CHANGE UP (ref 27–34)
MCHC RBC-ENTMCNC: 34.3 G/DL — SIGNIFICANT CHANGE UP (ref 32–36)
MCV RBC AUTO: 97.2 FL — SIGNIFICANT CHANGE UP (ref 80–100)
MONOCYTES # BLD AUTO: 1 K/UL — HIGH (ref 0–0.9)
MONOCYTES NFR BLD AUTO: 9.8 % — SIGNIFICANT CHANGE UP (ref 2–14)
NEUTROPHILS # BLD AUTO: 6.5 K/UL — SIGNIFICANT CHANGE UP (ref 1.8–7.4)
NEUTROPHILS NFR BLD AUTO: 60.3 % — SIGNIFICANT CHANGE UP (ref 43–77)
PLATELET # BLD AUTO: 310 K/UL — SIGNIFICANT CHANGE UP (ref 150–400)
RBC # BLD: 4.36 M/UL — SIGNIFICANT CHANGE UP (ref 3.8–5.2)
RBC # FLD: 12.7 % — SIGNIFICANT CHANGE UP (ref 10.3–14.5)
WBC # BLD: 10.8 K/UL — HIGH (ref 3.8–10.5)
WBC # FLD AUTO: 10.8 K/UL — HIGH (ref 3.8–10.5)

## 2020-01-29 PROCEDURE — 99214 OFFICE O/P EST MOD 30 MIN: CPT

## 2020-02-14 ENCOUNTER — OUTPATIENT (OUTPATIENT)
Dept: OUTPATIENT SERVICES | Facility: HOSPITAL | Age: 60
LOS: 1 days | Discharge: HOME | End: 2020-02-14

## 2020-02-14 ENCOUNTER — APPOINTMENT (OUTPATIENT)
Dept: CT IMAGING | Facility: IMAGING CENTER | Age: 60
End: 2020-02-14
Payer: COMMERCIAL

## 2020-02-14 ENCOUNTER — OUTPATIENT (OUTPATIENT)
Dept: OUTPATIENT SERVICES | Facility: HOSPITAL | Age: 60
LOS: 1 days | End: 2020-02-14
Payer: COMMERCIAL

## 2020-02-14 DIAGNOSIS — Z98.890 OTHER SPECIFIED POSTPROCEDURAL STATES: Chronic | ICD-10-CM

## 2020-02-14 DIAGNOSIS — C34.12 MALIGNANT NEOPLASM OF UPPER LOBE, LEFT BRONCHUS OR LUNG: ICD-10-CM

## 2020-02-14 DIAGNOSIS — O03.9 COMPLETE OR UNSPECIFIED SPONTANEOUS ABORTION WITHOUT COMPLICATION: Chronic | ICD-10-CM

## 2020-02-14 DIAGNOSIS — Z85.118 PERSONAL HISTORY OF OTHER MALIGNANT NEOPLASM OF BRONCHUS AND LUNG: Chronic | ICD-10-CM

## 2020-02-14 DIAGNOSIS — C34.90 MALIGNANT NEOPLASM OF UNSPECIFIED PART OF UNSPECIFIED BRONCHUS OR LUNG: ICD-10-CM

## 2020-02-14 PROCEDURE — 71260 CT THORAX DX C+: CPT

## 2020-02-14 PROCEDURE — 71260 CT THORAX DX C+: CPT | Mod: 26

## 2020-02-19 ENCOUNTER — RESULT REVIEW (OUTPATIENT)
Age: 60
End: 2020-02-19

## 2020-02-19 ENCOUNTER — LABORATORY RESULT (OUTPATIENT)
Age: 60
End: 2020-02-19

## 2020-02-19 ENCOUNTER — APPOINTMENT (OUTPATIENT)
Dept: INFUSION THERAPY | Facility: HOSPITAL | Age: 60
End: 2020-02-19

## 2020-02-19 ENCOUNTER — APPOINTMENT (OUTPATIENT)
Dept: HEMATOLOGY ONCOLOGY | Facility: CLINIC | Age: 60
End: 2020-02-19
Payer: COMMERCIAL

## 2020-02-19 VITALS
RESPIRATION RATE: 14 BRPM | BODY MASS INDEX: 29.29 KG/M2 | DIASTOLIC BLOOD PRESSURE: 74 MMHG | WEIGHT: 165.35 LBS | SYSTOLIC BLOOD PRESSURE: 120 MMHG | HEART RATE: 102 BPM | OXYGEN SATURATION: 99 % | TEMPERATURE: 98 F

## 2020-02-19 LAB
BASOPHILS # BLD AUTO: 0.1 K/UL — SIGNIFICANT CHANGE UP (ref 0–0.2)
EOSINOPHIL # BLD AUTO: 0 K/UL — SIGNIFICANT CHANGE UP (ref 0–0.5)
EOSINOPHIL NFR BLD AUTO: 4 % — SIGNIFICANT CHANGE UP (ref 0–6)
HCT VFR BLD CALC: 41.3 % — SIGNIFICANT CHANGE UP (ref 34.5–45)
HGB BLD-MCNC: 13.9 G/DL — SIGNIFICANT CHANGE UP (ref 11.5–15.5)
LYMPHOCYTES # BLD AUTO: 2.2 K/UL — SIGNIFICANT CHANGE UP (ref 1–3.3)
LYMPHOCYTES # BLD AUTO: 21 % — SIGNIFICANT CHANGE UP (ref 13–44)
MCHC RBC-ENTMCNC: 32.6 PG — SIGNIFICANT CHANGE UP (ref 27–34)
MCHC RBC-ENTMCNC: 33.6 G/DL — SIGNIFICANT CHANGE UP (ref 32–36)
MCV RBC AUTO: 97.2 FL — SIGNIFICANT CHANGE UP (ref 80–100)
MONOCYTES # BLD AUTO: 0.9 K/UL — SIGNIFICANT CHANGE UP (ref 0–0.9)
MONOCYTES NFR BLD AUTO: 8 % — SIGNIFICANT CHANGE UP (ref 2–14)
NEUTROPHILS # BLD AUTO: 5.6 K/UL — SIGNIFICANT CHANGE UP (ref 1.8–7.4)
NEUTROPHILS NFR BLD AUTO: 67 % — SIGNIFICANT CHANGE UP (ref 43–77)
PLAT MORPH BLD: NORMAL — SIGNIFICANT CHANGE UP
PLATELET # BLD AUTO: 338 K/UL — SIGNIFICANT CHANGE UP (ref 150–400)
RBC # BLD: 4.25 M/UL — SIGNIFICANT CHANGE UP (ref 3.8–5.2)
RBC # FLD: 12.8 % — SIGNIFICANT CHANGE UP (ref 10.3–14.5)
RBC BLD AUTO: SIGNIFICANT CHANGE UP
WBC # BLD: 8.8 K/UL — SIGNIFICANT CHANGE UP (ref 3.8–10.5)
WBC # FLD AUTO: 8.8 K/UL — SIGNIFICANT CHANGE UP (ref 3.8–10.5)

## 2020-02-19 PROCEDURE — 99214 OFFICE O/P EST MOD 30 MIN: CPT

## 2020-02-20 DIAGNOSIS — Z51.11 ENCOUNTER FOR ANTINEOPLASTIC CHEMOTHERAPY: ICD-10-CM

## 2020-02-20 DIAGNOSIS — R11.2 NAUSEA WITH VOMITING, UNSPECIFIED: ICD-10-CM

## 2020-03-11 ENCOUNTER — APPOINTMENT (OUTPATIENT)
Dept: HEMATOLOGY ONCOLOGY | Facility: CLINIC | Age: 60
End: 2020-03-11

## 2020-03-11 ENCOUNTER — APPOINTMENT (OUTPATIENT)
Dept: INFUSION THERAPY | Facility: HOSPITAL | Age: 60
End: 2020-03-11

## 2020-03-26 ENCOUNTER — OUTPATIENT (OUTPATIENT)
Dept: OUTPATIENT SERVICES | Facility: HOSPITAL | Age: 60
LOS: 1 days | Discharge: HOME | End: 2020-03-26

## 2020-03-26 DIAGNOSIS — Z85.118 PERSONAL HISTORY OF OTHER MALIGNANT NEOPLASM OF BRONCHUS AND LUNG: Chronic | ICD-10-CM

## 2020-03-26 DIAGNOSIS — C34.90 MALIGNANT NEOPLASM OF UNSPECIFIED PART OF UNSPECIFIED BRONCHUS OR LUNG: ICD-10-CM

## 2020-03-26 DIAGNOSIS — O03.9 COMPLETE OR UNSPECIFIED SPONTANEOUS ABORTION WITHOUT COMPLICATION: Chronic | ICD-10-CM

## 2020-03-26 DIAGNOSIS — Z98.890 OTHER SPECIFIED POSTPROCEDURAL STATES: Chronic | ICD-10-CM

## 2020-04-01 ENCOUNTER — LABORATORY RESULT (OUTPATIENT)
Age: 60
End: 2020-04-01

## 2020-04-01 ENCOUNTER — APPOINTMENT (OUTPATIENT)
Dept: INFUSION THERAPY | Facility: HOSPITAL | Age: 60
End: 2020-04-01

## 2020-04-01 ENCOUNTER — RESULT REVIEW (OUTPATIENT)
Age: 60
End: 2020-04-01

## 2020-04-01 ENCOUNTER — APPOINTMENT (OUTPATIENT)
Dept: HEMATOLOGY ONCOLOGY | Facility: CLINIC | Age: 60
End: 2020-04-01
Payer: COMMERCIAL

## 2020-04-01 LAB
BASOPHILS # BLD AUTO: 0.12 K/UL — SIGNIFICANT CHANGE UP (ref 0–0.2)
BASOPHILS NFR BLD AUTO: 1.2 % — SIGNIFICANT CHANGE UP (ref 0–2)
EOSINOPHIL # BLD AUTO: 0.91 K/UL — HIGH (ref 0–0.5)
EOSINOPHIL NFR BLD AUTO: 8.8 % — HIGH (ref 0–6)
HCT VFR BLD CALC: 42.7 % — SIGNIFICANT CHANGE UP (ref 34.5–45)
HGB BLD-MCNC: 14.4 G/DL — SIGNIFICANT CHANGE UP (ref 11.5–15.5)
IMM GRANULOCYTES NFR BLD AUTO: 0.4 % — SIGNIFICANT CHANGE UP (ref 0–1.5)
LYMPHOCYTES # BLD AUTO: 2.51 K/UL — SIGNIFICANT CHANGE UP (ref 1–3.3)
LYMPHOCYTES # BLD AUTO: 24.2 % — SIGNIFICANT CHANGE UP (ref 13–44)
MCHC RBC-ENTMCNC: 31.6 PG — SIGNIFICANT CHANGE UP (ref 27–34)
MCHC RBC-ENTMCNC: 33.7 GM/DL — SIGNIFICANT CHANGE UP (ref 32–36)
MCV RBC AUTO: 93.8 FL — SIGNIFICANT CHANGE UP (ref 80–100)
MONOCYTES # BLD AUTO: 1.09 K/UL — HIGH (ref 0–0.9)
MONOCYTES NFR BLD AUTO: 10.5 % — SIGNIFICANT CHANGE UP (ref 2–14)
NEUTROPHILS # BLD AUTO: 5.69 K/UL — SIGNIFICANT CHANGE UP (ref 1.8–7.4)
NEUTROPHILS NFR BLD AUTO: 54.9 % — SIGNIFICANT CHANGE UP (ref 43–77)
NRBC # BLD: 0 /100 WBCS — SIGNIFICANT CHANGE UP (ref 0–0)
PLATELET # BLD AUTO: 283 K/UL — SIGNIFICANT CHANGE UP (ref 150–400)
RBC # BLD: 4.55 M/UL — SIGNIFICANT CHANGE UP (ref 3.8–5.2)
RBC # FLD: 13 % — SIGNIFICANT CHANGE UP (ref 10.3–14.5)
WBC # BLD: 10.36 K/UL — SIGNIFICANT CHANGE UP (ref 3.8–10.5)
WBC # FLD AUTO: 10.36 K/UL — SIGNIFICANT CHANGE UP (ref 3.8–10.5)

## 2020-04-01 PROCEDURE — 99213 OFFICE O/P EST LOW 20 MIN: CPT

## 2020-04-02 DIAGNOSIS — R11.2 NAUSEA WITH VOMITING, UNSPECIFIED: ICD-10-CM

## 2020-04-02 DIAGNOSIS — Z51.11 ENCOUNTER FOR ANTINEOPLASTIC CHEMOTHERAPY: ICD-10-CM

## 2020-04-22 ENCOUNTER — APPOINTMENT (OUTPATIENT)
Dept: HEMATOLOGY ONCOLOGY | Facility: CLINIC | Age: 60
End: 2020-04-22

## 2020-04-22 ENCOUNTER — LABORATORY RESULT (OUTPATIENT)
Age: 60
End: 2020-04-22

## 2020-04-22 ENCOUNTER — APPOINTMENT (OUTPATIENT)
Dept: INFUSION THERAPY | Facility: HOSPITAL | Age: 60
End: 2020-04-22

## 2020-05-11 ENCOUNTER — OUTPATIENT (OUTPATIENT)
Dept: OUTPATIENT SERVICES | Facility: HOSPITAL | Age: 60
LOS: 1 days | Discharge: HOME | End: 2020-05-11

## 2020-05-11 DIAGNOSIS — O03.9 COMPLETE OR UNSPECIFIED SPONTANEOUS ABORTION WITHOUT COMPLICATION: Chronic | ICD-10-CM

## 2020-05-11 DIAGNOSIS — Z98.890 OTHER SPECIFIED POSTPROCEDURAL STATES: Chronic | ICD-10-CM

## 2020-05-11 DIAGNOSIS — C34.90 MALIGNANT NEOPLASM OF UNSPECIFIED PART OF UNSPECIFIED BRONCHUS OR LUNG: ICD-10-CM

## 2020-05-11 DIAGNOSIS — Z85.118 PERSONAL HISTORY OF OTHER MALIGNANT NEOPLASM OF BRONCHUS AND LUNG: Chronic | ICD-10-CM

## 2020-05-13 ENCOUNTER — RESULT REVIEW (OUTPATIENT)
Age: 60
End: 2020-05-13

## 2020-05-13 ENCOUNTER — APPOINTMENT (OUTPATIENT)
Dept: HEMATOLOGY ONCOLOGY | Facility: CLINIC | Age: 60
End: 2020-05-13
Payer: COMMERCIAL

## 2020-05-13 ENCOUNTER — LABORATORY RESULT (OUTPATIENT)
Age: 60
End: 2020-05-13

## 2020-05-13 ENCOUNTER — APPOINTMENT (OUTPATIENT)
Dept: INFUSION THERAPY | Facility: HOSPITAL | Age: 60
End: 2020-05-13

## 2020-05-13 VITALS
OXYGEN SATURATION: 95 % | HEART RATE: 90 BPM | TEMPERATURE: 98.4 F | RESPIRATION RATE: 17 BRPM | SYSTOLIC BLOOD PRESSURE: 130 MMHG | BODY MASS INDEX: 29.31 KG/M2 | WEIGHT: 165.43 LBS | DIASTOLIC BLOOD PRESSURE: 82 MMHG

## 2020-05-13 LAB
BASOPHILS # BLD AUTO: 0.12 K/UL — SIGNIFICANT CHANGE UP (ref 0–0.2)
BASOPHILS NFR BLD AUTO: 1.3 % — SIGNIFICANT CHANGE UP (ref 0–2)
EOSINOPHIL # BLD AUTO: 0.85 K/UL — HIGH (ref 0–0.5)
EOSINOPHIL NFR BLD AUTO: 9 % — HIGH (ref 0–6)
HCT VFR BLD CALC: 40.3 % — SIGNIFICANT CHANGE UP (ref 34.5–45)
HGB BLD-MCNC: 14 G/DL — SIGNIFICANT CHANGE UP (ref 11.5–15.5)
IMM GRANULOCYTES NFR BLD AUTO: 0.4 % — SIGNIFICANT CHANGE UP (ref 0–1.5)
LYMPHOCYTES # BLD AUTO: 2.38 K/UL — SIGNIFICANT CHANGE UP (ref 1–3.3)
LYMPHOCYTES # BLD AUTO: 25.2 % — SIGNIFICANT CHANGE UP (ref 13–44)
MCHC RBC-ENTMCNC: 31.6 PG — SIGNIFICANT CHANGE UP (ref 27–34)
MCHC RBC-ENTMCNC: 34.7 GM/DL — SIGNIFICANT CHANGE UP (ref 32–36)
MCV RBC AUTO: 91 FL — SIGNIFICANT CHANGE UP (ref 80–100)
MONOCYTES # BLD AUTO: 0.98 K/UL — HIGH (ref 0–0.9)
MONOCYTES NFR BLD AUTO: 10.4 % — SIGNIFICANT CHANGE UP (ref 2–14)
NEUTROPHILS # BLD AUTO: 5.08 K/UL — SIGNIFICANT CHANGE UP (ref 1.8–7.4)
NEUTROPHILS NFR BLD AUTO: 53.7 % — SIGNIFICANT CHANGE UP (ref 43–77)
NRBC # BLD: 0 /100 WBCS — SIGNIFICANT CHANGE UP (ref 0–0)
PLATELET # BLD AUTO: 295 K/UL — SIGNIFICANT CHANGE UP (ref 150–400)
RBC # BLD: 4.43 M/UL — SIGNIFICANT CHANGE UP (ref 3.8–5.2)
RBC # FLD: 12.9 % — SIGNIFICANT CHANGE UP (ref 10.3–14.5)
WBC # BLD: 9.45 K/UL — SIGNIFICANT CHANGE UP (ref 3.8–10.5)
WBC # FLD AUTO: 9.45 K/UL — SIGNIFICANT CHANGE UP (ref 3.8–10.5)

## 2020-05-13 PROCEDURE — 99213 OFFICE O/P EST LOW 20 MIN: CPT

## 2020-05-14 DIAGNOSIS — Z51.11 ENCOUNTER FOR ANTINEOPLASTIC CHEMOTHERAPY: ICD-10-CM

## 2020-05-14 DIAGNOSIS — R11.2 NAUSEA WITH VOMITING, UNSPECIFIED: ICD-10-CM

## 2020-05-29 ENCOUNTER — OUTPATIENT (OUTPATIENT)
Dept: OUTPATIENT SERVICES | Facility: HOSPITAL | Age: 60
LOS: 1 days | End: 2020-05-29
Payer: COMMERCIAL

## 2020-05-29 ENCOUNTER — APPOINTMENT (OUTPATIENT)
Dept: CT IMAGING | Facility: IMAGING CENTER | Age: 60
End: 2020-05-29
Payer: COMMERCIAL

## 2020-05-29 DIAGNOSIS — C34.90 MALIGNANT NEOPLASM OF UNSPECIFIED PART OF UNSPECIFIED BRONCHUS OR LUNG: ICD-10-CM

## 2020-05-29 DIAGNOSIS — O03.9 COMPLETE OR UNSPECIFIED SPONTANEOUS ABORTION WITHOUT COMPLICATION: Chronic | ICD-10-CM

## 2020-05-29 DIAGNOSIS — C34.12 MALIGNANT NEOPLASM OF UPPER LOBE, LEFT BRONCHUS OR LUNG: ICD-10-CM

## 2020-05-29 DIAGNOSIS — Z85.118 PERSONAL HISTORY OF OTHER MALIGNANT NEOPLASM OF BRONCHUS AND LUNG: Chronic | ICD-10-CM

## 2020-05-29 DIAGNOSIS — Z98.890 OTHER SPECIFIED POSTPROCEDURAL STATES: Chronic | ICD-10-CM

## 2020-05-29 PROCEDURE — 71260 CT THORAX DX C+: CPT

## 2020-05-29 PROCEDURE — 71260 CT THORAX DX C+: CPT | Mod: 26

## 2020-06-02 ENCOUNTER — RESULT REVIEW (OUTPATIENT)
Age: 60
End: 2020-06-02

## 2020-06-02 ENCOUNTER — APPOINTMENT (OUTPATIENT)
Dept: INFUSION THERAPY | Facility: HOSPITAL | Age: 60
End: 2020-06-02

## 2020-06-02 ENCOUNTER — LABORATORY RESULT (OUTPATIENT)
Age: 60
End: 2020-06-02

## 2020-06-02 ENCOUNTER — APPOINTMENT (OUTPATIENT)
Dept: HEMATOLOGY ONCOLOGY | Facility: CLINIC | Age: 60
End: 2020-06-02
Payer: COMMERCIAL

## 2020-06-02 VITALS
TEMPERATURE: 98.7 F | WEIGHT: 165.35 LBS | OXYGEN SATURATION: 97 % | SYSTOLIC BLOOD PRESSURE: 135 MMHG | DIASTOLIC BLOOD PRESSURE: 83 MMHG | RESPIRATION RATE: 18 BRPM | HEART RATE: 90 BPM | BODY MASS INDEX: 29.29 KG/M2

## 2020-06-02 DIAGNOSIS — K21.9 GASTRO-ESOPHAGEAL REFLUX DISEASE W/OUT ESOPHAGITIS: ICD-10-CM

## 2020-06-02 DIAGNOSIS — J30.2 OTHER SEASONAL ALLERGIC RHINITIS: ICD-10-CM

## 2020-06-02 LAB
BASOPHILS # BLD AUTO: 0.12 K/UL — SIGNIFICANT CHANGE UP (ref 0–0.2)
BASOPHILS NFR BLD AUTO: 1.2 % — SIGNIFICANT CHANGE UP (ref 0–2)
EOSINOPHIL # BLD AUTO: 0.49 K/UL — SIGNIFICANT CHANGE UP (ref 0–0.5)
EOSINOPHIL NFR BLD AUTO: 4.9 % — SIGNIFICANT CHANGE UP (ref 0–6)
HCT VFR BLD CALC: 41.4 % — SIGNIFICANT CHANGE UP (ref 34.5–45)
HGB BLD-MCNC: 14.1 G/DL — SIGNIFICANT CHANGE UP (ref 11.5–15.5)
IMM GRANULOCYTES NFR BLD AUTO: 0.4 % — SIGNIFICANT CHANGE UP (ref 0–1.5)
LYMPHOCYTES # BLD AUTO: 2.47 K/UL — SIGNIFICANT CHANGE UP (ref 1–3.3)
LYMPHOCYTES # BLD AUTO: 24.6 % — SIGNIFICANT CHANGE UP (ref 13–44)
MCHC RBC-ENTMCNC: 31.1 PG — SIGNIFICANT CHANGE UP (ref 27–34)
MCHC RBC-ENTMCNC: 34.1 GM/DL — SIGNIFICANT CHANGE UP (ref 32–36)
MCV RBC AUTO: 91.2 FL — SIGNIFICANT CHANGE UP (ref 80–100)
MONOCYTES # BLD AUTO: 0.98 K/UL — HIGH (ref 0–0.9)
MONOCYTES NFR BLD AUTO: 9.7 % — SIGNIFICANT CHANGE UP (ref 2–14)
NEUTROPHILS # BLD AUTO: 5.96 K/UL — SIGNIFICANT CHANGE UP (ref 1.8–7.4)
NEUTROPHILS NFR BLD AUTO: 59.2 % — SIGNIFICANT CHANGE UP (ref 43–77)
NRBC # BLD: 0 /100 WBCS — SIGNIFICANT CHANGE UP (ref 0–0)
PLATELET # BLD AUTO: 393 K/UL — SIGNIFICANT CHANGE UP (ref 150–400)
RBC # BLD: 4.54 M/UL — SIGNIFICANT CHANGE UP (ref 3.8–5.2)
RBC # FLD: 13.4 % — SIGNIFICANT CHANGE UP (ref 10.3–14.5)
WBC # BLD: 10.06 K/UL — SIGNIFICANT CHANGE UP (ref 3.8–10.5)
WBC # FLD AUTO: 10.06 K/UL — SIGNIFICANT CHANGE UP (ref 3.8–10.5)

## 2020-06-02 PROCEDURE — 99214 OFFICE O/P EST MOD 30 MIN: CPT

## 2020-06-02 RX ORDER — OMEPRAZOLE 20 MG/1
20 CAPSULE, DELAYED RELEASE ORAL DAILY
Qty: 30 | Refills: 3 | Status: COMPLETED | COMMUNITY
Start: 2020-01-08 | End: 2020-06-02

## 2020-06-04 PROBLEM — K21.9 GERD (GASTROESOPHAGEAL REFLUX DISEASE): Status: ACTIVE | Noted: 2020-01-08

## 2020-06-04 PROBLEM — J30.2 SEASONAL ALLERGIES: Status: ACTIVE | Noted: 2020-06-02

## 2020-06-19 ENCOUNTER — OUTPATIENT (OUTPATIENT)
Dept: OUTPATIENT SERVICES | Facility: HOSPITAL | Age: 60
LOS: 1 days | Discharge: HOME | End: 2020-06-19

## 2020-06-19 DIAGNOSIS — O03.9 COMPLETE OR UNSPECIFIED SPONTANEOUS ABORTION WITHOUT COMPLICATION: Chronic | ICD-10-CM

## 2020-06-19 DIAGNOSIS — Z98.890 OTHER SPECIFIED POSTPROCEDURAL STATES: Chronic | ICD-10-CM

## 2020-06-19 DIAGNOSIS — Z85.118 PERSONAL HISTORY OF OTHER MALIGNANT NEOPLASM OF BRONCHUS AND LUNG: Chronic | ICD-10-CM

## 2020-06-19 DIAGNOSIS — C34.90 MALIGNANT NEOPLASM OF UNSPECIFIED PART OF UNSPECIFIED BRONCHUS OR LUNG: ICD-10-CM

## 2020-06-24 ENCOUNTER — APPOINTMENT (OUTPATIENT)
Dept: INFUSION THERAPY | Facility: HOSPITAL | Age: 60
End: 2020-06-24

## 2020-06-24 ENCOUNTER — APPOINTMENT (OUTPATIENT)
Dept: HEMATOLOGY ONCOLOGY | Facility: CLINIC | Age: 60
End: 2020-06-24

## 2020-07-10 ENCOUNTER — LABORATORY RESULT (OUTPATIENT)
Age: 60
End: 2020-07-10

## 2020-07-10 ENCOUNTER — APPOINTMENT (OUTPATIENT)
Dept: INFUSION THERAPY | Facility: HOSPITAL | Age: 60
End: 2020-07-10

## 2020-07-15 ENCOUNTER — LABORATORY RESULT (OUTPATIENT)
Age: 60
End: 2020-07-15

## 2020-07-15 ENCOUNTER — APPOINTMENT (OUTPATIENT)
Dept: INFUSION THERAPY | Facility: HOSPITAL | Age: 60
End: 2020-07-15

## 2020-07-15 ENCOUNTER — RESULT REVIEW (OUTPATIENT)
Age: 60
End: 2020-07-15

## 2020-07-15 ENCOUNTER — APPOINTMENT (OUTPATIENT)
Dept: HEMATOLOGY ONCOLOGY | Facility: CLINIC | Age: 60
End: 2020-07-15
Payer: COMMERCIAL

## 2020-07-15 VITALS
RESPIRATION RATE: 18 BRPM | BODY MASS INDEX: 29.29 KG/M2 | TEMPERATURE: 98 F | SYSTOLIC BLOOD PRESSURE: 133 MMHG | DIASTOLIC BLOOD PRESSURE: 85 MMHG | HEART RATE: 83 BPM | OXYGEN SATURATION: 96 % | WEIGHT: 165.35 LBS

## 2020-07-15 LAB
BASOPHILS # BLD AUTO: 0.12 K/UL — SIGNIFICANT CHANGE UP (ref 0–0.2)
BASOPHILS NFR BLD AUTO: 1.2 % — SIGNIFICANT CHANGE UP (ref 0–2)
EOSINOPHIL # BLD AUTO: 0.66 K/UL — HIGH (ref 0–0.5)
EOSINOPHIL NFR BLD AUTO: 6.5 % — HIGH (ref 0–6)
HCT VFR BLD CALC: 41.3 % — SIGNIFICANT CHANGE UP (ref 34.5–45)
HGB BLD-MCNC: 14 G/DL — SIGNIFICANT CHANGE UP (ref 11.5–15.5)
IMM GRANULOCYTES NFR BLD AUTO: 0.4 % — SIGNIFICANT CHANGE UP (ref 0–1.5)
LYMPHOCYTES # BLD AUTO: 2.25 K/UL — SIGNIFICANT CHANGE UP (ref 1–3.3)
LYMPHOCYTES # BLD AUTO: 22.1 % — SIGNIFICANT CHANGE UP (ref 13–44)
MCHC RBC-ENTMCNC: 31.8 PG — SIGNIFICANT CHANGE UP (ref 27–34)
MCHC RBC-ENTMCNC: 33.9 GM/DL — SIGNIFICANT CHANGE UP (ref 32–36)
MCV RBC AUTO: 93.9 FL — SIGNIFICANT CHANGE UP (ref 80–100)
MONOCYTES # BLD AUTO: 0.86 K/UL — SIGNIFICANT CHANGE UP (ref 0–0.9)
MONOCYTES NFR BLD AUTO: 8.4 % — SIGNIFICANT CHANGE UP (ref 2–14)
NEUTROPHILS # BLD AUTO: 6.26 K/UL — SIGNIFICANT CHANGE UP (ref 1.8–7.4)
NEUTROPHILS NFR BLD AUTO: 61.4 % — SIGNIFICANT CHANGE UP (ref 43–77)
NRBC # BLD: 0 /100 WBCS — SIGNIFICANT CHANGE UP (ref 0–0)
PLATELET # BLD AUTO: 299 K/UL — SIGNIFICANT CHANGE UP (ref 150–400)
RBC # BLD: 4.4 M/UL — SIGNIFICANT CHANGE UP (ref 3.8–5.2)
RBC # FLD: 13.2 % — SIGNIFICANT CHANGE UP (ref 10.3–14.5)
WBC # BLD: 10.19 K/UL — SIGNIFICANT CHANGE UP (ref 3.8–10.5)
WBC # FLD AUTO: 10.19 K/UL — SIGNIFICANT CHANGE UP (ref 3.8–10.5)

## 2020-07-15 PROCEDURE — 99215 OFFICE O/P EST HI 40 MIN: CPT

## 2020-07-16 DIAGNOSIS — Z51.11 ENCOUNTER FOR ANTINEOPLASTIC CHEMOTHERAPY: ICD-10-CM

## 2020-07-16 DIAGNOSIS — R11.2 NAUSEA WITH VOMITING, UNSPECIFIED: ICD-10-CM

## 2020-08-05 ENCOUNTER — APPOINTMENT (OUTPATIENT)
Dept: HEMATOLOGY ONCOLOGY | Facility: CLINIC | Age: 60
End: 2020-08-05

## 2020-08-19 ENCOUNTER — OUTPATIENT (OUTPATIENT)
Dept: OUTPATIENT SERVICES | Facility: HOSPITAL | Age: 60
LOS: 1 days | Discharge: HOME | End: 2020-08-19

## 2020-08-19 DIAGNOSIS — C34.90 MALIGNANT NEOPLASM OF UNSPECIFIED PART OF UNSPECIFIED BRONCHUS OR LUNG: ICD-10-CM

## 2020-08-19 DIAGNOSIS — Z98.890 OTHER SPECIFIED POSTPROCEDURAL STATES: Chronic | ICD-10-CM

## 2020-08-19 DIAGNOSIS — O03.9 COMPLETE OR UNSPECIFIED SPONTANEOUS ABORTION WITHOUT COMPLICATION: Chronic | ICD-10-CM

## 2020-08-19 DIAGNOSIS — Z85.118 PERSONAL HISTORY OF OTHER MALIGNANT NEOPLASM OF BRONCHUS AND LUNG: Chronic | ICD-10-CM

## 2020-08-22 ENCOUNTER — OUTPATIENT (OUTPATIENT)
Dept: OUTPATIENT SERVICES | Facility: HOSPITAL | Age: 60
LOS: 1 days | End: 2020-08-22
Payer: COMMERCIAL

## 2020-08-22 ENCOUNTER — APPOINTMENT (OUTPATIENT)
Dept: CT IMAGING | Facility: IMAGING CENTER | Age: 60
End: 2020-08-22
Payer: COMMERCIAL

## 2020-08-22 DIAGNOSIS — Z85.118 PERSONAL HISTORY OF OTHER MALIGNANT NEOPLASM OF BRONCHUS AND LUNG: Chronic | ICD-10-CM

## 2020-08-22 DIAGNOSIS — Z00.8 ENCOUNTER FOR OTHER GENERAL EXAMINATION: ICD-10-CM

## 2020-08-22 DIAGNOSIS — Z98.890 OTHER SPECIFIED POSTPROCEDURAL STATES: Chronic | ICD-10-CM

## 2020-08-22 DIAGNOSIS — C34.12 MALIGNANT NEOPLASM OF UPPER LOBE, LEFT BRONCHUS OR LUNG: ICD-10-CM

## 2020-08-22 DIAGNOSIS — O03.9 COMPLETE OR UNSPECIFIED SPONTANEOUS ABORTION WITHOUT COMPLICATION: Chronic | ICD-10-CM

## 2020-08-22 PROCEDURE — 71260 CT THORAX DX C+: CPT

## 2020-08-22 PROCEDURE — 71260 CT THORAX DX C+: CPT | Mod: 26

## 2020-08-26 ENCOUNTER — LABORATORY RESULT (OUTPATIENT)
Age: 60
End: 2020-08-26

## 2020-08-26 ENCOUNTER — RESULT REVIEW (OUTPATIENT)
Age: 60
End: 2020-08-26

## 2020-08-26 ENCOUNTER — APPOINTMENT (OUTPATIENT)
Dept: INFUSION THERAPY | Facility: HOSPITAL | Age: 60
End: 2020-08-26

## 2020-08-26 ENCOUNTER — APPOINTMENT (OUTPATIENT)
Dept: HEMATOLOGY ONCOLOGY | Facility: CLINIC | Age: 60
End: 2020-08-26
Payer: COMMERCIAL

## 2020-08-26 VITALS
SYSTOLIC BLOOD PRESSURE: 137 MMHG | TEMPERATURE: 98 F | BODY MASS INDEX: 29.48 KG/M2 | HEART RATE: 80 BPM | WEIGHT: 166.45 LBS | OXYGEN SATURATION: 99 % | RESPIRATION RATE: 18 BRPM | DIASTOLIC BLOOD PRESSURE: 87 MMHG

## 2020-08-26 DIAGNOSIS — Z51.11 ENCOUNTER FOR ANTINEOPLASTIC CHEMOTHERAPY: ICD-10-CM

## 2020-08-26 DIAGNOSIS — R11.2 NAUSEA WITH VOMITING, UNSPECIFIED: ICD-10-CM

## 2020-08-26 LAB
BASOPHILS # BLD AUTO: 0.11 K/UL — SIGNIFICANT CHANGE UP (ref 0–0.2)
BASOPHILS NFR BLD AUTO: 1 % — SIGNIFICANT CHANGE UP (ref 0–2)
EOSINOPHIL # BLD AUTO: 0.78 K/UL — HIGH (ref 0–0.5)
EOSINOPHIL NFR BLD AUTO: 7.2 % — HIGH (ref 0–6)
HCT VFR BLD CALC: 41.1 % — SIGNIFICANT CHANGE UP (ref 34.5–45)
HGB BLD-MCNC: 13.8 G/DL — SIGNIFICANT CHANGE UP (ref 11.5–15.5)
IMM GRANULOCYTES NFR BLD AUTO: 0.5 % — SIGNIFICANT CHANGE UP (ref 0–1.5)
LYMPHOCYTES # BLD AUTO: 2.81 K/UL — SIGNIFICANT CHANGE UP (ref 1–3.3)
LYMPHOCYTES # BLD AUTO: 26 % — SIGNIFICANT CHANGE UP (ref 13–44)
MCHC RBC-ENTMCNC: 31.7 PG — SIGNIFICANT CHANGE UP (ref 27–34)
MCHC RBC-ENTMCNC: 33.6 GM/DL — SIGNIFICANT CHANGE UP (ref 32–36)
MCV RBC AUTO: 94.3 FL — SIGNIFICANT CHANGE UP (ref 80–100)
MONOCYTES # BLD AUTO: 0.89 K/UL — SIGNIFICANT CHANGE UP (ref 0–0.9)
MONOCYTES NFR BLD AUTO: 8.2 % — SIGNIFICANT CHANGE UP (ref 2–14)
NEUTROPHILS # BLD AUTO: 6.18 K/UL — SIGNIFICANT CHANGE UP (ref 1.8–7.4)
NEUTROPHILS NFR BLD AUTO: 57.1 % — SIGNIFICANT CHANGE UP (ref 43–77)
NRBC # BLD: 0 /100 WBCS — SIGNIFICANT CHANGE UP (ref 0–0)
PLATELET # BLD AUTO: 292 K/UL — SIGNIFICANT CHANGE UP (ref 150–400)
RBC # BLD: 4.36 M/UL — SIGNIFICANT CHANGE UP (ref 3.8–5.2)
RBC # FLD: 13 % — SIGNIFICANT CHANGE UP (ref 10.3–14.5)
WBC # BLD: 10.82 K/UL — HIGH (ref 3.8–10.5)
WBC # FLD AUTO: 10.82 K/UL — HIGH (ref 3.8–10.5)

## 2020-08-26 PROCEDURE — 99214 OFFICE O/P EST MOD 30 MIN: CPT

## 2020-08-27 RX ORDER — ZOLPIDEM TARTRATE 5 MG/1
5 TABLET ORAL
Qty: 30 | Refills: 0 | Status: COMPLETED | COMMUNITY
Start: 2019-06-17 | End: 2020-08-27

## 2020-09-30 ENCOUNTER — OUTPATIENT (OUTPATIENT)
Dept: OUTPATIENT SERVICES | Facility: HOSPITAL | Age: 60
LOS: 1 days | Discharge: HOME | End: 2020-09-30

## 2020-09-30 DIAGNOSIS — O03.9 COMPLETE OR UNSPECIFIED SPONTANEOUS ABORTION WITHOUT COMPLICATION: Chronic | ICD-10-CM

## 2020-09-30 DIAGNOSIS — Z98.890 OTHER SPECIFIED POSTPROCEDURAL STATES: Chronic | ICD-10-CM

## 2020-09-30 DIAGNOSIS — C34.90 MALIGNANT NEOPLASM OF UNSPECIFIED PART OF UNSPECIFIED BRONCHUS OR LUNG: ICD-10-CM

## 2020-09-30 DIAGNOSIS — Z85.118 PERSONAL HISTORY OF OTHER MALIGNANT NEOPLASM OF BRONCHUS AND LUNG: Chronic | ICD-10-CM

## 2020-10-07 ENCOUNTER — LABORATORY RESULT (OUTPATIENT)
Age: 60
End: 2020-10-07

## 2020-10-07 ENCOUNTER — RESULT REVIEW (OUTPATIENT)
Age: 60
End: 2020-10-07

## 2020-10-07 ENCOUNTER — APPOINTMENT (OUTPATIENT)
Dept: INFUSION THERAPY | Facility: HOSPITAL | Age: 60
End: 2020-10-07

## 2020-10-07 ENCOUNTER — APPOINTMENT (OUTPATIENT)
Dept: HEMATOLOGY ONCOLOGY | Facility: CLINIC | Age: 60
End: 2020-10-07
Payer: COMMERCIAL

## 2020-10-07 VITALS
BODY MASS INDEX: 29.21 KG/M2 | TEMPERATURE: 99 F | DIASTOLIC BLOOD PRESSURE: 88 MMHG | SYSTOLIC BLOOD PRESSURE: 153 MMHG | OXYGEN SATURATION: 98 % | HEART RATE: 88 BPM | WEIGHT: 164.91 LBS | RESPIRATION RATE: 14 BRPM

## 2020-10-07 DIAGNOSIS — R11.2 NAUSEA WITH VOMITING, UNSPECIFIED: ICD-10-CM

## 2020-10-07 DIAGNOSIS — Z51.11 ENCOUNTER FOR ANTINEOPLASTIC CHEMOTHERAPY: ICD-10-CM

## 2020-10-07 LAB
BASOPHILS # BLD AUTO: 0.11 K/UL — SIGNIFICANT CHANGE UP (ref 0–0.2)
BASOPHILS NFR BLD AUTO: 1 % — SIGNIFICANT CHANGE UP (ref 0–2)
EOSINOPHIL # BLD AUTO: 0.69 K/UL — HIGH (ref 0–0.5)
EOSINOPHIL NFR BLD AUTO: 6 % — SIGNIFICANT CHANGE UP (ref 0–6)
HCT VFR BLD CALC: 41.4 % — SIGNIFICANT CHANGE UP (ref 34.5–45)
HGB BLD-MCNC: 14.3 G/DL — SIGNIFICANT CHANGE UP (ref 11.5–15.5)
IMM GRANULOCYTES NFR BLD AUTO: 0.3 % — SIGNIFICANT CHANGE UP (ref 0–1.5)
LYMPHOCYTES # BLD AUTO: 2.71 K/UL — SIGNIFICANT CHANGE UP (ref 1–3.3)
LYMPHOCYTES # BLD AUTO: 23.5 % — SIGNIFICANT CHANGE UP (ref 13–44)
MCHC RBC-ENTMCNC: 31.6 PG — SIGNIFICANT CHANGE UP (ref 27–34)
MCHC RBC-ENTMCNC: 34.5 G/DL — SIGNIFICANT CHANGE UP (ref 32–36)
MCV RBC AUTO: 91.6 FL — SIGNIFICANT CHANGE UP (ref 80–100)
MONOCYTES # BLD AUTO: 0.9 K/UL — SIGNIFICANT CHANGE UP (ref 0–0.9)
MONOCYTES NFR BLD AUTO: 7.8 % — SIGNIFICANT CHANGE UP (ref 2–14)
NEUTROPHILS # BLD AUTO: 7.1 K/UL — SIGNIFICANT CHANGE UP (ref 1.8–7.4)
NEUTROPHILS NFR BLD AUTO: 61.4 % — SIGNIFICANT CHANGE UP (ref 43–77)
NRBC # BLD: 0 /100 WBCS — SIGNIFICANT CHANGE UP (ref 0–0)
PLATELET # BLD AUTO: 299 K/UL — SIGNIFICANT CHANGE UP (ref 150–400)
RBC # BLD: 4.52 M/UL — SIGNIFICANT CHANGE UP (ref 3.8–5.2)
RBC # FLD: 13 % — SIGNIFICANT CHANGE UP (ref 10.3–14.5)
WBC # BLD: 11.55 K/UL — HIGH (ref 3.8–10.5)
WBC # FLD AUTO: 11.55 K/UL — HIGH (ref 3.8–10.5)

## 2020-10-07 PROCEDURE — 99213 OFFICE O/P EST LOW 20 MIN: CPT

## 2020-11-12 ENCOUNTER — OUTPATIENT (OUTPATIENT)
Dept: OUTPATIENT SERVICES | Facility: HOSPITAL | Age: 60
LOS: 1 days | Discharge: HOME | End: 2020-11-12

## 2020-11-12 DIAGNOSIS — Z98.890 OTHER SPECIFIED POSTPROCEDURAL STATES: Chronic | ICD-10-CM

## 2020-11-12 DIAGNOSIS — Z85.118 PERSONAL HISTORY OF OTHER MALIGNANT NEOPLASM OF BRONCHUS AND LUNG: Chronic | ICD-10-CM

## 2020-11-12 DIAGNOSIS — O03.9 COMPLETE OR UNSPECIFIED SPONTANEOUS ABORTION WITHOUT COMPLICATION: Chronic | ICD-10-CM

## 2020-11-12 DIAGNOSIS — C34.90 MALIGNANT NEOPLASM OF UNSPECIFIED PART OF UNSPECIFIED BRONCHUS OR LUNG: ICD-10-CM

## 2020-11-13 ENCOUNTER — APPOINTMENT (OUTPATIENT)
Dept: CT IMAGING | Facility: IMAGING CENTER | Age: 60
End: 2020-11-13
Payer: COMMERCIAL

## 2020-11-13 ENCOUNTER — OUTPATIENT (OUTPATIENT)
Dept: OUTPATIENT SERVICES | Facility: HOSPITAL | Age: 60
LOS: 1 days | End: 2020-11-13
Payer: COMMERCIAL

## 2020-11-13 DIAGNOSIS — Z98.890 OTHER SPECIFIED POSTPROCEDURAL STATES: Chronic | ICD-10-CM

## 2020-11-13 DIAGNOSIS — C34.90 MALIGNANT NEOPLASM OF UNSPECIFIED PART OF UNSPECIFIED BRONCHUS OR LUNG: ICD-10-CM

## 2020-11-13 DIAGNOSIS — C34.12 MALIGNANT NEOPLASM OF UPPER LOBE, LEFT BRONCHUS OR LUNG: ICD-10-CM

## 2020-11-13 DIAGNOSIS — Z00.8 ENCOUNTER FOR OTHER GENERAL EXAMINATION: ICD-10-CM

## 2020-11-13 DIAGNOSIS — Z85.118 PERSONAL HISTORY OF OTHER MALIGNANT NEOPLASM OF BRONCHUS AND LUNG: Chronic | ICD-10-CM

## 2020-11-13 DIAGNOSIS — O03.9 COMPLETE OR UNSPECIFIED SPONTANEOUS ABORTION WITHOUT COMPLICATION: Chronic | ICD-10-CM

## 2020-11-13 PROCEDURE — 71260 CT THORAX DX C+: CPT | Mod: 26

## 2020-11-13 PROCEDURE — 71260 CT THORAX DX C+: CPT

## 2020-11-18 ENCOUNTER — APPOINTMENT (OUTPATIENT)
Dept: HEMATOLOGY ONCOLOGY | Facility: CLINIC | Age: 60
End: 2020-11-18
Payer: COMMERCIAL

## 2020-11-18 ENCOUNTER — APPOINTMENT (OUTPATIENT)
Dept: INFUSION THERAPY | Facility: HOSPITAL | Age: 60
End: 2020-11-18

## 2020-11-18 ENCOUNTER — RESULT REVIEW (OUTPATIENT)
Age: 60
End: 2020-11-18

## 2020-11-18 ENCOUNTER — LABORATORY RESULT (OUTPATIENT)
Age: 60
End: 2020-11-18

## 2020-11-18 VITALS
OXYGEN SATURATION: 99 % | WEIGHT: 164.91 LBS | HEART RATE: 82 BPM | RESPIRATION RATE: 14 BRPM | SYSTOLIC BLOOD PRESSURE: 130 MMHG | BODY MASS INDEX: 29.21 KG/M2 | DIASTOLIC BLOOD PRESSURE: 74 MMHG | TEMPERATURE: 98 F

## 2020-11-18 DIAGNOSIS — Z51.11 ENCOUNTER FOR ANTINEOPLASTIC CHEMOTHERAPY: ICD-10-CM

## 2020-11-18 DIAGNOSIS — R11.2 NAUSEA WITH VOMITING, UNSPECIFIED: ICD-10-CM

## 2020-11-18 LAB
BASOPHILS # BLD AUTO: 0.15 K/UL — SIGNIFICANT CHANGE UP (ref 0–0.2)
BASOPHILS NFR BLD AUTO: 1.2 % — SIGNIFICANT CHANGE UP (ref 0–2)
EOSINOPHIL # BLD AUTO: 1.24 K/UL — HIGH (ref 0–0.5)
EOSINOPHIL NFR BLD AUTO: 10.1 % — HIGH (ref 0–6)
HCT VFR BLD CALC: 42.7 % — SIGNIFICANT CHANGE UP (ref 34.5–45)
HGB BLD-MCNC: 14.4 G/DL — SIGNIFICANT CHANGE UP (ref 11.5–15.5)
IMM GRANULOCYTES NFR BLD AUTO: 0.3 % — SIGNIFICANT CHANGE UP (ref 0–1.5)
LYMPHOCYTES # BLD AUTO: 2.95 K/UL — SIGNIFICANT CHANGE UP (ref 1–3.3)
LYMPHOCYTES # BLD AUTO: 24.1 % — SIGNIFICANT CHANGE UP (ref 13–44)
MCHC RBC-ENTMCNC: 31.2 PG — SIGNIFICANT CHANGE UP (ref 27–34)
MCHC RBC-ENTMCNC: 33.7 G/DL — SIGNIFICANT CHANGE UP (ref 32–36)
MCV RBC AUTO: 92.4 FL — SIGNIFICANT CHANGE UP (ref 80–100)
MONOCYTES # BLD AUTO: 1.08 K/UL — HIGH (ref 0–0.9)
MONOCYTES NFR BLD AUTO: 8.8 % — SIGNIFICANT CHANGE UP (ref 2–14)
NEUTROPHILS # BLD AUTO: 6.78 K/UL — SIGNIFICANT CHANGE UP (ref 1.8–7.4)
NEUTROPHILS NFR BLD AUTO: 55.5 % — SIGNIFICANT CHANGE UP (ref 43–77)
NRBC # BLD: 0 /100 WBCS — SIGNIFICANT CHANGE UP (ref 0–0)
PLATELET # BLD AUTO: 318 K/UL — SIGNIFICANT CHANGE UP (ref 150–400)
RBC # BLD: 4.62 M/UL — SIGNIFICANT CHANGE UP (ref 3.8–5.2)
RBC # FLD: 12.7 % — SIGNIFICANT CHANGE UP (ref 10.3–14.5)
WBC # BLD: 12.24 K/UL — HIGH (ref 3.8–10.5)
WBC # FLD AUTO: 12.24 K/UL — HIGH (ref 3.8–10.5)

## 2020-11-18 PROCEDURE — 99214 OFFICE O/P EST MOD 30 MIN: CPT

## 2020-11-19 ENCOUNTER — NON-APPOINTMENT (OUTPATIENT)
Age: 60
End: 2020-11-19

## 2020-12-23 ENCOUNTER — OUTPATIENT (OUTPATIENT)
Dept: OUTPATIENT SERVICES | Facility: HOSPITAL | Age: 60
LOS: 1 days | Discharge: HOME | End: 2020-12-23

## 2020-12-23 DIAGNOSIS — Z98.890 OTHER SPECIFIED POSTPROCEDURAL STATES: Chronic | ICD-10-CM

## 2020-12-23 DIAGNOSIS — C34.90 MALIGNANT NEOPLASM OF UNSPECIFIED PART OF UNSPECIFIED BRONCHUS OR LUNG: ICD-10-CM

## 2020-12-23 DIAGNOSIS — O03.9 COMPLETE OR UNSPECIFIED SPONTANEOUS ABORTION WITHOUT COMPLICATION: Chronic | ICD-10-CM

## 2020-12-23 DIAGNOSIS — Z85.118 PERSONAL HISTORY OF OTHER MALIGNANT NEOPLASM OF BRONCHUS AND LUNG: Chronic | ICD-10-CM

## 2020-12-30 ENCOUNTER — LABORATORY RESULT (OUTPATIENT)
Age: 60
End: 2020-12-30

## 2020-12-30 ENCOUNTER — RESULT REVIEW (OUTPATIENT)
Age: 60
End: 2020-12-30

## 2020-12-30 ENCOUNTER — APPOINTMENT (OUTPATIENT)
Dept: INFUSION THERAPY | Facility: HOSPITAL | Age: 60
End: 2020-12-30

## 2020-12-30 ENCOUNTER — APPOINTMENT (OUTPATIENT)
Dept: HEMATOLOGY ONCOLOGY | Facility: CLINIC | Age: 60
End: 2020-12-30
Payer: COMMERCIAL

## 2020-12-30 VITALS
RESPIRATION RATE: 15 BRPM | BODY MASS INDEX: 30.14 KG/M2 | HEART RATE: 80 BPM | WEIGHT: 163.8 LBS | HEIGHT: 62 IN | TEMPERATURE: 97.2 F | DIASTOLIC BLOOD PRESSURE: 86 MMHG | SYSTOLIC BLOOD PRESSURE: 149 MMHG | OXYGEN SATURATION: 96 %

## 2020-12-30 DIAGNOSIS — R11.2 NAUSEA WITH VOMITING, UNSPECIFIED: ICD-10-CM

## 2020-12-30 DIAGNOSIS — Z51.11 ENCOUNTER FOR ANTINEOPLASTIC CHEMOTHERAPY: ICD-10-CM

## 2020-12-30 LAB
BASOPHILS # BLD AUTO: 0.12 K/UL — SIGNIFICANT CHANGE UP (ref 0–0.2)
BASOPHILS NFR BLD AUTO: 1.1 % — SIGNIFICANT CHANGE UP (ref 0–2)
EOSINOPHIL # BLD AUTO: 1.11 K/UL — HIGH (ref 0–0.5)
EOSINOPHIL NFR BLD AUTO: 10.1 % — HIGH (ref 0–6)
HCT VFR BLD CALC: 42.8 % — SIGNIFICANT CHANGE UP (ref 34.5–45)
HGB BLD-MCNC: 14.3 G/DL — SIGNIFICANT CHANGE UP (ref 11.5–15.5)
IMM GRANULOCYTES NFR BLD AUTO: 0.4 % — SIGNIFICANT CHANGE UP (ref 0–1.5)
LYMPHOCYTES # BLD AUTO: 2.86 K/UL — SIGNIFICANT CHANGE UP (ref 1–3.3)
LYMPHOCYTES # BLD AUTO: 26.1 % — SIGNIFICANT CHANGE UP (ref 13–44)
MCHC RBC-ENTMCNC: 31.5 PG — SIGNIFICANT CHANGE UP (ref 27–34)
MCHC RBC-ENTMCNC: 33.4 G/DL — SIGNIFICANT CHANGE UP (ref 32–36)
MCV RBC AUTO: 94.3 FL — SIGNIFICANT CHANGE UP (ref 80–100)
MONOCYTES # BLD AUTO: 0.94 K/UL — HIGH (ref 0–0.9)
MONOCYTES NFR BLD AUTO: 8.6 % — SIGNIFICANT CHANGE UP (ref 2–14)
NEUTROPHILS # BLD AUTO: 5.9 K/UL — SIGNIFICANT CHANGE UP (ref 1.8–7.4)
NEUTROPHILS NFR BLD AUTO: 53.7 % — SIGNIFICANT CHANGE UP (ref 43–77)
NRBC # BLD: 0 /100 WBCS — SIGNIFICANT CHANGE UP (ref 0–0)
PLATELET # BLD AUTO: 297 K/UL — SIGNIFICANT CHANGE UP (ref 150–400)
RBC # BLD: 4.54 M/UL — SIGNIFICANT CHANGE UP (ref 3.8–5.2)
RBC # FLD: 13.2 % — SIGNIFICANT CHANGE UP (ref 10.3–14.5)
WBC # BLD: 10.97 K/UL — HIGH (ref 3.8–10.5)
WBC # FLD AUTO: 10.97 K/UL — HIGH (ref 3.8–10.5)

## 2020-12-30 PROCEDURE — 99072 ADDL SUPL MATRL&STAF TM PHE: CPT

## 2020-12-30 PROCEDURE — 99213 OFFICE O/P EST LOW 20 MIN: CPT

## 2020-12-31 ENCOUNTER — NON-APPOINTMENT (OUTPATIENT)
Age: 60
End: 2020-12-31

## 2021-02-05 ENCOUNTER — OUTPATIENT (OUTPATIENT)
Dept: OUTPATIENT SERVICES | Facility: HOSPITAL | Age: 61
LOS: 1 days | Discharge: HOME | End: 2021-02-05

## 2021-02-05 DIAGNOSIS — O03.9 COMPLETE OR UNSPECIFIED SPONTANEOUS ABORTION WITHOUT COMPLICATION: Chronic | ICD-10-CM

## 2021-02-05 DIAGNOSIS — C34.90 MALIGNANT NEOPLASM OF UNSPECIFIED PART OF UNSPECIFIED BRONCHUS OR LUNG: ICD-10-CM

## 2021-02-05 DIAGNOSIS — Z98.890 OTHER SPECIFIED POSTPROCEDURAL STATES: Chronic | ICD-10-CM

## 2021-02-05 DIAGNOSIS — Z85.118 PERSONAL HISTORY OF OTHER MALIGNANT NEOPLASM OF BRONCHUS AND LUNG: Chronic | ICD-10-CM

## 2021-02-08 ENCOUNTER — APPOINTMENT (OUTPATIENT)
Dept: CT IMAGING | Facility: IMAGING CENTER | Age: 61
End: 2021-02-08
Payer: COMMERCIAL

## 2021-02-08 ENCOUNTER — OUTPATIENT (OUTPATIENT)
Dept: OUTPATIENT SERVICES | Facility: HOSPITAL | Age: 61
LOS: 1 days | End: 2021-02-08
Payer: COMMERCIAL

## 2021-02-08 DIAGNOSIS — O03.9 COMPLETE OR UNSPECIFIED SPONTANEOUS ABORTION WITHOUT COMPLICATION: Chronic | ICD-10-CM

## 2021-02-08 DIAGNOSIS — Z00.8 ENCOUNTER FOR OTHER GENERAL EXAMINATION: ICD-10-CM

## 2021-02-08 DIAGNOSIS — Z85.118 PERSONAL HISTORY OF OTHER MALIGNANT NEOPLASM OF BRONCHUS AND LUNG: Chronic | ICD-10-CM

## 2021-02-08 DIAGNOSIS — Z98.890 OTHER SPECIFIED POSTPROCEDURAL STATES: Chronic | ICD-10-CM

## 2021-02-08 PROCEDURE — 71260 CT THORAX DX C+: CPT | Mod: 26

## 2021-02-08 PROCEDURE — 71260 CT THORAX DX C+: CPT

## 2021-02-10 ENCOUNTER — LABORATORY RESULT (OUTPATIENT)
Age: 61
End: 2021-02-10

## 2021-02-10 ENCOUNTER — RESULT REVIEW (OUTPATIENT)
Age: 61
End: 2021-02-10

## 2021-02-10 ENCOUNTER — APPOINTMENT (OUTPATIENT)
Dept: INFUSION THERAPY | Facility: HOSPITAL | Age: 61
End: 2021-02-10

## 2021-02-10 ENCOUNTER — APPOINTMENT (OUTPATIENT)
Dept: HEMATOLOGY ONCOLOGY | Facility: CLINIC | Age: 61
End: 2021-02-10
Payer: COMMERCIAL

## 2021-02-10 VITALS
RESPIRATION RATE: 16 BRPM | HEIGHT: 62 IN | WEIGHT: 161.6 LBS | OXYGEN SATURATION: 99 % | DIASTOLIC BLOOD PRESSURE: 80 MMHG | HEART RATE: 97 BPM | TEMPERATURE: 97 F | BODY MASS INDEX: 29.74 KG/M2 | SYSTOLIC BLOOD PRESSURE: 124 MMHG

## 2021-02-10 DIAGNOSIS — Z51.11 ENCOUNTER FOR ANTINEOPLASTIC CHEMOTHERAPY: ICD-10-CM

## 2021-02-10 DIAGNOSIS — R11.2 NAUSEA WITH VOMITING, UNSPECIFIED: ICD-10-CM

## 2021-02-10 LAB
BASOPHILS # BLD AUTO: 0.13 K/UL — SIGNIFICANT CHANGE UP (ref 0–0.2)
BASOPHILS NFR BLD AUTO: 1 % — SIGNIFICANT CHANGE UP (ref 0–2)
EOSINOPHIL # BLD AUTO: 1.39 K/UL — HIGH (ref 0–0.5)
EOSINOPHIL NFR BLD AUTO: 11.2 % — HIGH (ref 0–6)
HCT VFR BLD CALC: 43.4 % — SIGNIFICANT CHANGE UP (ref 34.5–45)
HGB BLD-MCNC: 14.9 G/DL — SIGNIFICANT CHANGE UP (ref 11.5–15.5)
IMM GRANULOCYTES NFR BLD AUTO: 0.4 % — SIGNIFICANT CHANGE UP (ref 0–1.5)
LYMPHOCYTES # BLD AUTO: 24.9 % — SIGNIFICANT CHANGE UP (ref 13–44)
LYMPHOCYTES # BLD AUTO: 3.09 K/UL — SIGNIFICANT CHANGE UP (ref 1–3.3)
MCHC RBC-ENTMCNC: 31.6 PG — SIGNIFICANT CHANGE UP (ref 27–34)
MCHC RBC-ENTMCNC: 34.3 G/DL — SIGNIFICANT CHANGE UP (ref 32–36)
MCV RBC AUTO: 91.9 FL — SIGNIFICANT CHANGE UP (ref 80–100)
MONOCYTES # BLD AUTO: 1.13 K/UL — HIGH (ref 0–0.9)
MONOCYTES NFR BLD AUTO: 9.1 % — SIGNIFICANT CHANGE UP (ref 2–14)
NEUTROPHILS # BLD AUTO: 6.63 K/UL — SIGNIFICANT CHANGE UP (ref 1.8–7.4)
NEUTROPHILS NFR BLD AUTO: 53.4 % — SIGNIFICANT CHANGE UP (ref 43–77)
NRBC # BLD: 0 /100 WBCS — SIGNIFICANT CHANGE UP (ref 0–0)
PLATELET # BLD AUTO: 334 K/UL — SIGNIFICANT CHANGE UP (ref 150–400)
RBC # BLD: 4.72 M/UL — SIGNIFICANT CHANGE UP (ref 3.8–5.2)
RBC # FLD: 12.8 % — SIGNIFICANT CHANGE UP (ref 10.3–14.5)
WBC # BLD: 12.42 K/UL — HIGH (ref 3.8–10.5)
WBC # FLD AUTO: 12.42 K/UL — HIGH (ref 3.8–10.5)

## 2021-02-10 PROCEDURE — 99214 OFFICE O/P EST MOD 30 MIN: CPT

## 2021-02-10 PROCEDURE — 99072 ADDL SUPL MATRL&STAF TM PHE: CPT

## 2021-02-11 ENCOUNTER — NON-APPOINTMENT (OUTPATIENT)
Age: 61
End: 2021-02-11

## 2021-03-05 ENCOUNTER — NON-APPOINTMENT (OUTPATIENT)
Age: 61
End: 2021-03-05

## 2021-03-18 ENCOUNTER — NON-APPOINTMENT (OUTPATIENT)
Age: 61
End: 2021-03-18

## 2021-03-24 ENCOUNTER — APPOINTMENT (OUTPATIENT)
Dept: INFUSION THERAPY | Facility: HOSPITAL | Age: 61
End: 2021-03-24

## 2021-03-24 ENCOUNTER — APPOINTMENT (OUTPATIENT)
Dept: HEMATOLOGY ONCOLOGY | Facility: CLINIC | Age: 61
End: 2021-03-24

## 2021-04-06 ENCOUNTER — RX RENEWAL (OUTPATIENT)
Age: 61
End: 2021-04-06

## 2021-04-06 RX ORDER — DESLORATADINE 5 MG/1
5 TABLET ORAL DAILY
Qty: 30 | Refills: 3 | Status: ACTIVE | COMMUNITY
Start: 2020-06-02 | End: 1900-01-01

## 2021-05-04 ENCOUNTER — OUTPATIENT (OUTPATIENT)
Dept: OUTPATIENT SERVICES | Facility: HOSPITAL | Age: 61
LOS: 1 days | Discharge: HOME | End: 2021-05-04

## 2021-05-04 DIAGNOSIS — Z85.118 PERSONAL HISTORY OF OTHER MALIGNANT NEOPLASM OF BRONCHUS AND LUNG: Chronic | ICD-10-CM

## 2021-05-04 DIAGNOSIS — Z98.890 OTHER SPECIFIED POSTPROCEDURAL STATES: Chronic | ICD-10-CM

## 2021-05-04 DIAGNOSIS — O03.9 COMPLETE OR UNSPECIFIED SPONTANEOUS ABORTION WITHOUT COMPLICATION: Chronic | ICD-10-CM

## 2021-05-04 DIAGNOSIS — C34.90 MALIGNANT NEOPLASM OF UNSPECIFIED PART OF UNSPECIFIED BRONCHUS OR LUNG: ICD-10-CM

## 2021-05-05 ENCOUNTER — RESULT REVIEW (OUTPATIENT)
Age: 61
End: 2021-05-05

## 2021-05-05 ENCOUNTER — APPOINTMENT (OUTPATIENT)
Dept: INFUSION THERAPY | Facility: HOSPITAL | Age: 61
End: 2021-05-05

## 2021-05-05 ENCOUNTER — LABORATORY RESULT (OUTPATIENT)
Age: 61
End: 2021-05-05

## 2021-05-05 ENCOUNTER — APPOINTMENT (OUTPATIENT)
Dept: HEMATOLOGY ONCOLOGY | Facility: CLINIC | Age: 61
End: 2021-05-05
Payer: COMMERCIAL

## 2021-05-05 VITALS
SYSTOLIC BLOOD PRESSURE: 144 MMHG | HEIGHT: 62.09 IN | OXYGEN SATURATION: 99 % | DIASTOLIC BLOOD PRESSURE: 87 MMHG | RESPIRATION RATE: 17 BRPM | HEART RATE: 94 BPM | BODY MASS INDEX: 28.92 KG/M2 | WEIGHT: 159.17 LBS | TEMPERATURE: 96.8 F

## 2021-05-05 DIAGNOSIS — R11.2 NAUSEA WITH VOMITING, UNSPECIFIED: ICD-10-CM

## 2021-05-05 DIAGNOSIS — Z51.11 ENCOUNTER FOR ANTINEOPLASTIC CHEMOTHERAPY: ICD-10-CM

## 2021-05-05 LAB
BASOPHILS # BLD AUTO: 0.1 K/UL — SIGNIFICANT CHANGE UP (ref 0–0.2)
BASOPHILS NFR BLD AUTO: 0.7 % — SIGNIFICANT CHANGE UP (ref 0–2)
EOSINOPHIL # BLD AUTO: 0.89 K/UL — HIGH (ref 0–0.5)
EOSINOPHIL NFR BLD AUTO: 6.7 % — HIGH (ref 0–6)
HCT VFR BLD CALC: 42.1 % — SIGNIFICANT CHANGE UP (ref 34.5–45)
HGB BLD-MCNC: 14.3 G/DL — SIGNIFICANT CHANGE UP (ref 11.5–15.5)
IMM GRANULOCYTES NFR BLD AUTO: 0.5 % — SIGNIFICANT CHANGE UP (ref 0–1.5)
LYMPHOCYTES # BLD AUTO: 2.82 K/UL — SIGNIFICANT CHANGE UP (ref 1–3.3)
LYMPHOCYTES # BLD AUTO: 21.1 % — SIGNIFICANT CHANGE UP (ref 13–44)
MCHC RBC-ENTMCNC: 31 PG — SIGNIFICANT CHANGE UP (ref 27–34)
MCHC RBC-ENTMCNC: 34 G/DL — SIGNIFICANT CHANGE UP (ref 32–36)
MCV RBC AUTO: 91.3 FL — SIGNIFICANT CHANGE UP (ref 80–100)
MONOCYTES # BLD AUTO: 1.1 K/UL — HIGH (ref 0–0.9)
MONOCYTES NFR BLD AUTO: 8.2 % — SIGNIFICANT CHANGE UP (ref 2–14)
NEUTROPHILS # BLD AUTO: 8.38 K/UL — HIGH (ref 1.8–7.4)
NEUTROPHILS NFR BLD AUTO: 62.8 % — SIGNIFICANT CHANGE UP (ref 43–77)
NRBC # BLD: 0 /100 WBCS — SIGNIFICANT CHANGE UP (ref 0–0)
PLATELET # BLD AUTO: 302 K/UL — SIGNIFICANT CHANGE UP (ref 150–400)
RBC # BLD: 4.61 M/UL — SIGNIFICANT CHANGE UP (ref 3.8–5.2)
RBC # FLD: 12.2 % — SIGNIFICANT CHANGE UP (ref 10.3–14.5)
WBC # BLD: 13.36 K/UL — HIGH (ref 3.8–10.5)
WBC # FLD AUTO: 13.36 K/UL — HIGH (ref 3.8–10.5)

## 2021-05-05 PROCEDURE — 99214 OFFICE O/P EST MOD 30 MIN: CPT

## 2021-05-05 PROCEDURE — 99072 ADDL SUPL MATRL&STAF TM PHE: CPT

## 2021-05-05 RX ORDER — ZOLPIDEM TARTRATE 10 MG/1
10 TABLET ORAL
Qty: 30 | Refills: 5 | Status: DISCONTINUED | COMMUNITY
Start: 2020-08-27 | End: 2021-05-05

## 2021-05-05 RX ORDER — METOCLOPRAMIDE 10 MG/1
10 TABLET ORAL
Qty: 60 | Refills: 3 | Status: DISCONTINUED | COMMUNITY
Start: 2019-05-24 | End: 2021-05-05

## 2021-05-06 ENCOUNTER — NON-APPOINTMENT (OUTPATIENT)
Age: 61
End: 2021-05-06

## 2021-06-11 ENCOUNTER — APPOINTMENT (OUTPATIENT)
Dept: CT IMAGING | Facility: IMAGING CENTER | Age: 61
End: 2021-06-11
Payer: COMMERCIAL

## 2021-06-11 ENCOUNTER — OUTPATIENT (OUTPATIENT)
Dept: OUTPATIENT SERVICES | Facility: HOSPITAL | Age: 61
LOS: 1 days | End: 2021-06-11
Payer: COMMERCIAL

## 2021-06-11 DIAGNOSIS — O03.9 COMPLETE OR UNSPECIFIED SPONTANEOUS ABORTION WITHOUT COMPLICATION: Chronic | ICD-10-CM

## 2021-06-11 DIAGNOSIS — Z00.8 ENCOUNTER FOR OTHER GENERAL EXAMINATION: ICD-10-CM

## 2021-06-11 DIAGNOSIS — C34.12 MALIGNANT NEOPLASM OF UPPER LOBE, LEFT BRONCHUS OR LUNG: ICD-10-CM

## 2021-06-11 DIAGNOSIS — Z98.890 OTHER SPECIFIED POSTPROCEDURAL STATES: Chronic | ICD-10-CM

## 2021-06-11 DIAGNOSIS — Z85.118 PERSONAL HISTORY OF OTHER MALIGNANT NEOPLASM OF BRONCHUS AND LUNG: Chronic | ICD-10-CM

## 2021-06-11 PROCEDURE — 71260 CT THORAX DX C+: CPT | Mod: 26

## 2021-06-11 PROCEDURE — 71260 CT THORAX DX C+: CPT

## 2021-06-15 ENCOUNTER — OUTPATIENT (OUTPATIENT)
Dept: OUTPATIENT SERVICES | Facility: HOSPITAL | Age: 61
LOS: 1 days | Discharge: HOME | End: 2021-06-15

## 2021-06-15 DIAGNOSIS — Z98.890 OTHER SPECIFIED POSTPROCEDURAL STATES: Chronic | ICD-10-CM

## 2021-06-15 DIAGNOSIS — Z85.118 PERSONAL HISTORY OF OTHER MALIGNANT NEOPLASM OF BRONCHUS AND LUNG: Chronic | ICD-10-CM

## 2021-06-15 DIAGNOSIS — C34.90 MALIGNANT NEOPLASM OF UNSPECIFIED PART OF UNSPECIFIED BRONCHUS OR LUNG: ICD-10-CM

## 2021-06-15 DIAGNOSIS — O03.9 COMPLETE OR UNSPECIFIED SPONTANEOUS ABORTION WITHOUT COMPLICATION: Chronic | ICD-10-CM

## 2021-06-16 ENCOUNTER — LABORATORY RESULT (OUTPATIENT)
Age: 61
End: 2021-06-16

## 2021-06-16 ENCOUNTER — RESULT REVIEW (OUTPATIENT)
Age: 61
End: 2021-06-16

## 2021-06-16 ENCOUNTER — APPOINTMENT (OUTPATIENT)
Dept: HEMATOLOGY ONCOLOGY | Facility: CLINIC | Age: 61
End: 2021-06-16
Payer: COMMERCIAL

## 2021-06-16 ENCOUNTER — APPOINTMENT (OUTPATIENT)
Dept: INFUSION THERAPY | Facility: HOSPITAL | Age: 61
End: 2021-06-16

## 2021-06-16 VITALS
BODY MASS INDEX: 28.84 KG/M2 | HEART RATE: 99 BPM | RESPIRATION RATE: 18 BRPM | OXYGEN SATURATION: 97 % | WEIGHT: 158.73 LBS | HEIGHT: 62.09 IN | SYSTOLIC BLOOD PRESSURE: 141 MMHG | DIASTOLIC BLOOD PRESSURE: 86 MMHG | TEMPERATURE: 97.9 F

## 2021-06-16 DIAGNOSIS — R11.2 NAUSEA WITH VOMITING, UNSPECIFIED: ICD-10-CM

## 2021-06-16 DIAGNOSIS — Z51.11 ENCOUNTER FOR ANTINEOPLASTIC CHEMOTHERAPY: ICD-10-CM

## 2021-06-16 DIAGNOSIS — G47.00 INSOMNIA, UNSPECIFIED: ICD-10-CM

## 2021-06-16 LAB
BASOPHILS # BLD AUTO: 0.16 K/UL — SIGNIFICANT CHANGE UP (ref 0–0.2)
BASOPHILS NFR BLD AUTO: 1.2 % — SIGNIFICANT CHANGE UP (ref 0–2)
EOSINOPHIL # BLD AUTO: 0.78 K/UL — HIGH (ref 0–0.5)
EOSINOPHIL NFR BLD AUTO: 6.1 % — HIGH (ref 0–6)
HCT VFR BLD CALC: 41.8 % — SIGNIFICANT CHANGE UP (ref 34.5–45)
HGB BLD-MCNC: 14.4 G/DL — SIGNIFICANT CHANGE UP (ref 11.5–15.5)
IMM GRANULOCYTES NFR BLD AUTO: 1 % — SIGNIFICANT CHANGE UP (ref 0–1.5)
LYMPHOCYTES # BLD AUTO: 25.1 % — SIGNIFICANT CHANGE UP (ref 13–44)
LYMPHOCYTES # BLD AUTO: 3.23 K/UL — SIGNIFICANT CHANGE UP (ref 1–3.3)
MCHC RBC-ENTMCNC: 31.4 PG — SIGNIFICANT CHANGE UP (ref 27–34)
MCHC RBC-ENTMCNC: 34.4 G/DL — SIGNIFICANT CHANGE UP (ref 32–36)
MCV RBC AUTO: 91.1 FL — SIGNIFICANT CHANGE UP (ref 80–100)
MONOCYTES # BLD AUTO: 1.08 K/UL — HIGH (ref 0–0.9)
MONOCYTES NFR BLD AUTO: 8.4 % — SIGNIFICANT CHANGE UP (ref 2–14)
NEUTROPHILS # BLD AUTO: 7.48 K/UL — HIGH (ref 1.8–7.4)
NEUTROPHILS NFR BLD AUTO: 58.2 % — SIGNIFICANT CHANGE UP (ref 43–77)
NRBC # BLD: 0 /100 WBCS — SIGNIFICANT CHANGE UP (ref 0–0)
PLATELET # BLD AUTO: 329 K/UL — SIGNIFICANT CHANGE UP (ref 150–400)
RBC # BLD: 4.59 M/UL — SIGNIFICANT CHANGE UP (ref 3.8–5.2)
RBC # FLD: 13.2 % — SIGNIFICANT CHANGE UP (ref 10.3–14.5)
WBC # BLD: 12.86 K/UL — HIGH (ref 3.8–10.5)
WBC # FLD AUTO: 12.86 K/UL — HIGH (ref 3.8–10.5)

## 2021-06-16 PROCEDURE — 99215 OFFICE O/P EST HI 40 MIN: CPT

## 2021-06-16 PROCEDURE — 99072 ADDL SUPL MATRL&STAF TM PHE: CPT

## 2021-07-27 ENCOUNTER — OUTPATIENT (OUTPATIENT)
Dept: OUTPATIENT SERVICES | Facility: HOSPITAL | Age: 61
LOS: 1 days | Discharge: HOME | End: 2021-07-27

## 2021-07-27 DIAGNOSIS — C34.90 MALIGNANT NEOPLASM OF UNSPECIFIED PART OF UNSPECIFIED BRONCHUS OR LUNG: ICD-10-CM

## 2021-07-27 DIAGNOSIS — Z85.118 PERSONAL HISTORY OF OTHER MALIGNANT NEOPLASM OF BRONCHUS AND LUNG: Chronic | ICD-10-CM

## 2021-07-27 DIAGNOSIS — Z98.890 OTHER SPECIFIED POSTPROCEDURAL STATES: Chronic | ICD-10-CM

## 2021-07-27 DIAGNOSIS — O03.9 COMPLETE OR UNSPECIFIED SPONTANEOUS ABORTION WITHOUT COMPLICATION: Chronic | ICD-10-CM

## 2021-07-28 ENCOUNTER — APPOINTMENT (OUTPATIENT)
Dept: HEMATOLOGY ONCOLOGY | Facility: CLINIC | Age: 61
End: 2021-07-28

## 2021-07-28 ENCOUNTER — APPOINTMENT (OUTPATIENT)
Dept: INFUSION THERAPY | Facility: HOSPITAL | Age: 61
End: 2021-07-28

## 2021-09-10 ENCOUNTER — NON-APPOINTMENT (OUTPATIENT)
Age: 61
End: 2021-09-10

## 2021-09-13 ENCOUNTER — OUTPATIENT (OUTPATIENT)
Dept: OUTPATIENT SERVICES | Facility: HOSPITAL | Age: 61
LOS: 1 days | Discharge: HOME | End: 2021-09-13

## 2021-09-13 DIAGNOSIS — Z85.118 PERSONAL HISTORY OF OTHER MALIGNANT NEOPLASM OF BRONCHUS AND LUNG: Chronic | ICD-10-CM

## 2021-09-13 DIAGNOSIS — Z98.890 OTHER SPECIFIED POSTPROCEDURAL STATES: Chronic | ICD-10-CM

## 2021-09-13 DIAGNOSIS — O03.9 COMPLETE OR UNSPECIFIED SPONTANEOUS ABORTION WITHOUT COMPLICATION: Chronic | ICD-10-CM

## 2021-09-13 DIAGNOSIS — C34.90 MALIGNANT NEOPLASM OF UNSPECIFIED PART OF UNSPECIFIED BRONCHUS OR LUNG: ICD-10-CM

## 2021-09-15 ENCOUNTER — LABORATORY RESULT (OUTPATIENT)
Age: 61
End: 2021-09-15

## 2021-09-15 ENCOUNTER — RESULT REVIEW (OUTPATIENT)
Age: 61
End: 2021-09-15

## 2021-09-15 ENCOUNTER — APPOINTMENT (OUTPATIENT)
Dept: HEMATOLOGY ONCOLOGY | Facility: CLINIC | Age: 61
End: 2021-09-15
Payer: COMMERCIAL

## 2021-09-15 ENCOUNTER — APPOINTMENT (OUTPATIENT)
Dept: INFUSION THERAPY | Facility: HOSPITAL | Age: 61
End: 2021-09-15

## 2021-09-15 VITALS
BODY MASS INDEX: 29.72 KG/M2 | HEIGHT: 62.09 IN | HEART RATE: 85 BPM | WEIGHT: 163.58 LBS | RESPIRATION RATE: 16 BRPM | TEMPERATURE: 97.9 F | OXYGEN SATURATION: 97 % | DIASTOLIC BLOOD PRESSURE: 81 MMHG | SYSTOLIC BLOOD PRESSURE: 139 MMHG

## 2021-09-15 DIAGNOSIS — R11.2 NAUSEA WITH VOMITING, UNSPECIFIED: ICD-10-CM

## 2021-09-15 DIAGNOSIS — Z51.11 ENCOUNTER FOR ANTINEOPLASTIC CHEMOTHERAPY: ICD-10-CM

## 2021-09-15 LAB
BASOPHILS # BLD AUTO: 0.12 K/UL — SIGNIFICANT CHANGE UP (ref 0–0.2)
BASOPHILS NFR BLD AUTO: 1.1 % — SIGNIFICANT CHANGE UP (ref 0–2)
EOSINOPHIL # BLD AUTO: 0.69 K/UL — HIGH (ref 0–0.5)
EOSINOPHIL NFR BLD AUTO: 6.2 % — HIGH (ref 0–6)
HCT VFR BLD CALC: 42.2 % — SIGNIFICANT CHANGE UP (ref 34.5–45)
HGB BLD-MCNC: 14.3 G/DL — SIGNIFICANT CHANGE UP (ref 11.5–15.5)
IMM GRANULOCYTES NFR BLD AUTO: 0.6 % — SIGNIFICANT CHANGE UP (ref 0–1.5)
LYMPHOCYTES # BLD AUTO: 2.38 K/UL — SIGNIFICANT CHANGE UP (ref 1–3.3)
LYMPHOCYTES # BLD AUTO: 21.3 % — SIGNIFICANT CHANGE UP (ref 13–44)
MCHC RBC-ENTMCNC: 31.1 PG — SIGNIFICANT CHANGE UP (ref 27–34)
MCHC RBC-ENTMCNC: 33.9 G/DL — SIGNIFICANT CHANGE UP (ref 32–36)
MCV RBC AUTO: 91.7 FL — SIGNIFICANT CHANGE UP (ref 80–100)
MONOCYTES # BLD AUTO: 0.8 K/UL — SIGNIFICANT CHANGE UP (ref 0–0.9)
MONOCYTES NFR BLD AUTO: 7.2 % — SIGNIFICANT CHANGE UP (ref 2–14)
NEUTROPHILS # BLD AUTO: 7.09 K/UL — SIGNIFICANT CHANGE UP (ref 1.8–7.4)
NEUTROPHILS NFR BLD AUTO: 63.6 % — SIGNIFICANT CHANGE UP (ref 43–77)
NRBC # BLD: 0 /100 WBCS — SIGNIFICANT CHANGE UP (ref 0–0)
PLATELET # BLD AUTO: 289 K/UL — SIGNIFICANT CHANGE UP (ref 150–400)
RBC # BLD: 4.6 M/UL — SIGNIFICANT CHANGE UP (ref 3.8–5.2)
RBC # FLD: 12.4 % — SIGNIFICANT CHANGE UP (ref 10.3–14.5)
WBC # BLD: 11.15 K/UL — HIGH (ref 3.8–10.5)
WBC # FLD AUTO: 11.15 K/UL — HIGH (ref 3.8–10.5)

## 2021-09-15 PROCEDURE — 99214 OFFICE O/P EST MOD 30 MIN: CPT

## 2021-09-16 ENCOUNTER — NON-APPOINTMENT (OUTPATIENT)
Age: 61
End: 2021-09-16

## 2021-10-01 RX ORDER — FOLIC ACID 1 MG/1
1 TABLET ORAL
Qty: 90 | Refills: 3 | Status: ACTIVE | COMMUNITY
Start: 2019-07-05 | End: 1900-01-01

## 2021-10-25 ENCOUNTER — OUTPATIENT (OUTPATIENT)
Dept: OUTPATIENT SERVICES | Facility: HOSPITAL | Age: 61
LOS: 1 days | Discharge: HOME | End: 2021-10-25

## 2021-10-25 ENCOUNTER — APPOINTMENT (OUTPATIENT)
Dept: CT IMAGING | Facility: CLINIC | Age: 61
End: 2021-10-25
Payer: COMMERCIAL

## 2021-10-25 ENCOUNTER — OUTPATIENT (OUTPATIENT)
Dept: OUTPATIENT SERVICES | Facility: HOSPITAL | Age: 61
LOS: 1 days | End: 2021-10-25
Payer: COMMERCIAL

## 2021-10-25 DIAGNOSIS — Z85.118 PERSONAL HISTORY OF OTHER MALIGNANT NEOPLASM OF BRONCHUS AND LUNG: Chronic | ICD-10-CM

## 2021-10-25 DIAGNOSIS — Z98.890 OTHER SPECIFIED POSTPROCEDURAL STATES: Chronic | ICD-10-CM

## 2021-10-25 DIAGNOSIS — C34.12 MALIGNANT NEOPLASM OF UPPER LOBE, LEFT BRONCHUS OR LUNG: ICD-10-CM

## 2021-10-25 DIAGNOSIS — C34.90 MALIGNANT NEOPLASM OF UNSPECIFIED PART OF UNSPECIFIED BRONCHUS OR LUNG: ICD-10-CM

## 2021-10-25 DIAGNOSIS — O03.9 COMPLETE OR UNSPECIFIED SPONTANEOUS ABORTION WITHOUT COMPLICATION: Chronic | ICD-10-CM

## 2021-10-25 PROCEDURE — 71260 CT THORAX DX C+: CPT | Mod: 26

## 2021-10-25 PROCEDURE — 71260 CT THORAX DX C+: CPT

## 2021-10-27 ENCOUNTER — LABORATORY RESULT (OUTPATIENT)
Age: 61
End: 2021-10-27

## 2021-10-27 ENCOUNTER — RESULT REVIEW (OUTPATIENT)
Age: 61
End: 2021-10-27

## 2021-10-27 ENCOUNTER — APPOINTMENT (OUTPATIENT)
Dept: INFUSION THERAPY | Facility: HOSPITAL | Age: 61
End: 2021-10-27

## 2021-10-27 ENCOUNTER — APPOINTMENT (OUTPATIENT)
Dept: HEMATOLOGY ONCOLOGY | Facility: CLINIC | Age: 61
End: 2021-10-27
Payer: COMMERCIAL

## 2021-10-27 VITALS
BODY MASS INDEX: 30.12 KG/M2 | RESPIRATION RATE: 18 BRPM | DIASTOLIC BLOOD PRESSURE: 83 MMHG | OXYGEN SATURATION: 97 % | WEIGHT: 165.77 LBS | SYSTOLIC BLOOD PRESSURE: 138 MMHG | HEIGHT: 62.09 IN | HEART RATE: 76 BPM | TEMPERATURE: 97.7 F

## 2021-10-27 DIAGNOSIS — Z51.11 ENCOUNTER FOR ANTINEOPLASTIC CHEMOTHERAPY: ICD-10-CM

## 2021-10-27 DIAGNOSIS — R11.2 NAUSEA WITH VOMITING, UNSPECIFIED: ICD-10-CM

## 2021-10-27 LAB
BASOPHILS # BLD AUTO: 0.14 K/UL — SIGNIFICANT CHANGE UP (ref 0–0.2)
BASOPHILS NFR BLD AUTO: 1.1 % — SIGNIFICANT CHANGE UP (ref 0–2)
EOSINOPHIL # BLD AUTO: 1.02 K/UL — HIGH (ref 0–0.5)
EOSINOPHIL NFR BLD AUTO: 8.2 % — HIGH (ref 0–6)
HCT VFR BLD CALC: 41.9 % — SIGNIFICANT CHANGE UP (ref 34.5–45)
HGB BLD-MCNC: 14.4 G/DL — SIGNIFICANT CHANGE UP (ref 11.5–15.5)
IMM GRANULOCYTES NFR BLD AUTO: 0.6 % — SIGNIFICANT CHANGE UP (ref 0–1.5)
LYMPHOCYTES # BLD AUTO: 2.86 K/UL — SIGNIFICANT CHANGE UP (ref 1–3.3)
LYMPHOCYTES # BLD AUTO: 23.1 % — SIGNIFICANT CHANGE UP (ref 13–44)
MCHC RBC-ENTMCNC: 31.5 PG — SIGNIFICANT CHANGE UP (ref 27–34)
MCHC RBC-ENTMCNC: 34.4 G/DL — SIGNIFICANT CHANGE UP (ref 32–36)
MCV RBC AUTO: 91.7 FL — SIGNIFICANT CHANGE UP (ref 80–100)
MONOCYTES # BLD AUTO: 1.11 K/UL — HIGH (ref 0–0.9)
MONOCYTES NFR BLD AUTO: 9 % — SIGNIFICANT CHANGE UP (ref 2–14)
NEUTROPHILS # BLD AUTO: 7.19 K/UL — SIGNIFICANT CHANGE UP (ref 1.8–7.4)
NEUTROPHILS NFR BLD AUTO: 58 % — SIGNIFICANT CHANGE UP (ref 43–77)
NRBC # BLD: 0 /100 WBCS — SIGNIFICANT CHANGE UP (ref 0–0)
PLATELET # BLD AUTO: 279 K/UL — SIGNIFICANT CHANGE UP (ref 150–400)
RBC # BLD: 4.57 M/UL — SIGNIFICANT CHANGE UP (ref 3.8–5.2)
RBC # FLD: 12.9 % — SIGNIFICANT CHANGE UP (ref 10.3–14.5)
WBC # BLD: 12.39 K/UL — HIGH (ref 3.8–10.5)
WBC # FLD AUTO: 12.39 K/UL — HIGH (ref 3.8–10.5)

## 2021-10-27 PROCEDURE — 99214 OFFICE O/P EST MOD 30 MIN: CPT

## 2021-12-07 ENCOUNTER — OUTPATIENT (OUTPATIENT)
Dept: OUTPATIENT SERVICES | Facility: HOSPITAL | Age: 61
LOS: 1 days | Discharge: HOME | End: 2021-12-07

## 2021-12-07 DIAGNOSIS — Z98.890 OTHER SPECIFIED POSTPROCEDURAL STATES: Chronic | ICD-10-CM

## 2021-12-07 DIAGNOSIS — Z85.118 PERSONAL HISTORY OF OTHER MALIGNANT NEOPLASM OF BRONCHUS AND LUNG: Chronic | ICD-10-CM

## 2021-12-07 DIAGNOSIS — C34.90 MALIGNANT NEOPLASM OF UNSPECIFIED PART OF UNSPECIFIED BRONCHUS OR LUNG: ICD-10-CM

## 2021-12-07 DIAGNOSIS — O03.9 COMPLETE OR UNSPECIFIED SPONTANEOUS ABORTION WITHOUT COMPLICATION: Chronic | ICD-10-CM

## 2021-12-08 ENCOUNTER — RESULT REVIEW (OUTPATIENT)
Age: 61
End: 2021-12-08

## 2021-12-08 ENCOUNTER — APPOINTMENT (OUTPATIENT)
Dept: HEMATOLOGY ONCOLOGY | Facility: CLINIC | Age: 61
End: 2021-12-08
Payer: COMMERCIAL

## 2021-12-08 ENCOUNTER — APPOINTMENT (OUTPATIENT)
Dept: INFUSION THERAPY | Facility: HOSPITAL | Age: 61
End: 2021-12-08

## 2021-12-08 VITALS
DIASTOLIC BLOOD PRESSURE: 84 MMHG | HEART RATE: 84 BPM | BODY MASS INDEX: 29.72 KG/M2 | HEIGHT: 62.09 IN | RESPIRATION RATE: 16 BRPM | TEMPERATURE: 97.2 F | OXYGEN SATURATION: 96 % | WEIGHT: 163.58 LBS | SYSTOLIC BLOOD PRESSURE: 145 MMHG

## 2021-12-08 DIAGNOSIS — R11.2 NAUSEA WITH VOMITING, UNSPECIFIED: ICD-10-CM

## 2021-12-08 DIAGNOSIS — Z51.11 ENCOUNTER FOR ANTINEOPLASTIC CHEMOTHERAPY: ICD-10-CM

## 2021-12-08 LAB
BASOPHILS # BLD AUTO: 0.16 K/UL — SIGNIFICANT CHANGE UP (ref 0–0.2)
BASOPHILS NFR BLD AUTO: 1.3 % — SIGNIFICANT CHANGE UP (ref 0–2)
EOSINOPHIL # BLD AUTO: 0.74 K/UL — HIGH (ref 0–0.5)
EOSINOPHIL NFR BLD AUTO: 6 % — SIGNIFICANT CHANGE UP (ref 0–6)
HCT VFR BLD CALC: 41.3 % — SIGNIFICANT CHANGE UP (ref 34.5–45)
HGB BLD-MCNC: 14 G/DL — SIGNIFICANT CHANGE UP (ref 11.5–15.5)
IMM GRANULOCYTES NFR BLD AUTO: 0.4 % — SIGNIFICANT CHANGE UP (ref 0–1.5)
LYMPHOCYTES # BLD AUTO: 2.72 K/UL — SIGNIFICANT CHANGE UP (ref 1–3.3)
LYMPHOCYTES # BLD AUTO: 22.1 % — SIGNIFICANT CHANGE UP (ref 13–44)
MCHC RBC-ENTMCNC: 31.8 PG — SIGNIFICANT CHANGE UP (ref 27–34)
MCHC RBC-ENTMCNC: 33.9 G/DL — SIGNIFICANT CHANGE UP (ref 32–36)
MCV RBC AUTO: 93.9 FL — SIGNIFICANT CHANGE UP (ref 80–100)
MONOCYTES # BLD AUTO: 1.14 K/UL — HIGH (ref 0–0.9)
MONOCYTES NFR BLD AUTO: 9.3 % — SIGNIFICANT CHANGE UP (ref 2–14)
NEUTROPHILS # BLD AUTO: 7.48 K/UL — HIGH (ref 1.8–7.4)
NEUTROPHILS NFR BLD AUTO: 60.9 % — SIGNIFICANT CHANGE UP (ref 43–77)
NRBC # BLD: 0 /100 WBCS — SIGNIFICANT CHANGE UP (ref 0–0)
PLATELET # BLD AUTO: 271 K/UL — SIGNIFICANT CHANGE UP (ref 150–400)
RBC # BLD: 4.4 M/UL — SIGNIFICANT CHANGE UP (ref 3.8–5.2)
RBC # FLD: 13.4 % — SIGNIFICANT CHANGE UP (ref 10.3–14.5)
WBC # BLD: 12.29 K/UL — HIGH (ref 3.8–10.5)
WBC # FLD AUTO: 12.29 K/UL — HIGH (ref 3.8–10.5)

## 2021-12-08 PROCEDURE — 99214 OFFICE O/P EST MOD 30 MIN: CPT

## 2021-12-09 ENCOUNTER — NON-APPOINTMENT (OUTPATIENT)
Age: 61
End: 2021-12-09

## 2021-12-09 LAB
ALBUMIN SERPL ELPH-MCNC: 4.4 G/DL — SIGNIFICANT CHANGE UP (ref 3.3–5)
ALP SERPL-CCNC: 80 U/L — SIGNIFICANT CHANGE UP (ref 40–120)
ALT FLD-CCNC: 16 U/L — SIGNIFICANT CHANGE UP (ref 10–45)
ANION GAP SERPL CALC-SCNC: 9 MMOL/L — SIGNIFICANT CHANGE UP (ref 5–17)
AST SERPL-CCNC: 17 U/L — SIGNIFICANT CHANGE UP (ref 10–40)
BILIRUB SERPL-MCNC: <0.2 MG/DL — SIGNIFICANT CHANGE UP (ref 0.2–1.2)
BUN SERPL-MCNC: 11 MG/DL — SIGNIFICANT CHANGE UP (ref 7–23)
CALCIUM SERPL-MCNC: 10.1 MG/DL — SIGNIFICANT CHANGE UP (ref 8.4–10.5)
CHLORIDE SERPL-SCNC: 107 MMOL/L — SIGNIFICANT CHANGE UP (ref 96–108)
CO2 SERPL-SCNC: 26 MMOL/L — SIGNIFICANT CHANGE UP (ref 22–31)
CREAT SERPL-MCNC: 0.71 MG/DL — SIGNIFICANT CHANGE UP (ref 0.5–1.3)
GLUCOSE SERPL-MCNC: 88 MG/DL — SIGNIFICANT CHANGE UP (ref 70–99)
POTASSIUM SERPL-MCNC: 4.3 MMOL/L — SIGNIFICANT CHANGE UP (ref 3.5–5.3)
POTASSIUM SERPL-SCNC: 4.3 MMOL/L — SIGNIFICANT CHANGE UP (ref 3.5–5.3)
PROT SERPL-MCNC: 7.5 G/DL — SIGNIFICANT CHANGE UP (ref 6–8.3)
SODIUM SERPL-SCNC: 142 MMOL/L — SIGNIFICANT CHANGE UP (ref 135–145)
T4 FREE SERPL-MCNC: 1.8 NG/DL — SIGNIFICANT CHANGE UP (ref 0.9–1.8)
TSH SERPL-MCNC: 0.33 UIU/ML — SIGNIFICANT CHANGE UP (ref 0.27–4.2)

## 2022-01-12 ENCOUNTER — NON-APPOINTMENT (OUTPATIENT)
Age: 62
End: 2022-01-12

## 2022-01-17 ENCOUNTER — OUTPATIENT (OUTPATIENT)
Dept: OUTPATIENT SERVICES | Facility: HOSPITAL | Age: 62
LOS: 1 days | Discharge: HOME | End: 2022-01-17

## 2022-01-17 DIAGNOSIS — C34.90 MALIGNANT NEOPLASM OF UNSPECIFIED PART OF UNSPECIFIED BRONCHUS OR LUNG: ICD-10-CM

## 2022-01-17 DIAGNOSIS — Z98.890 OTHER SPECIFIED POSTPROCEDURAL STATES: Chronic | ICD-10-CM

## 2022-01-17 DIAGNOSIS — O03.9 COMPLETE OR UNSPECIFIED SPONTANEOUS ABORTION WITHOUT COMPLICATION: Chronic | ICD-10-CM

## 2022-01-17 DIAGNOSIS — Z85.118 PERSONAL HISTORY OF OTHER MALIGNANT NEOPLASM OF BRONCHUS AND LUNG: Chronic | ICD-10-CM

## 2022-01-19 ENCOUNTER — RESULT REVIEW (OUTPATIENT)
Age: 62
End: 2022-01-19

## 2022-01-19 ENCOUNTER — APPOINTMENT (OUTPATIENT)
Dept: HEMATOLOGY ONCOLOGY | Facility: CLINIC | Age: 62
End: 2022-01-19
Payer: COMMERCIAL

## 2022-01-19 ENCOUNTER — NON-APPOINTMENT (OUTPATIENT)
Age: 62
End: 2022-01-19

## 2022-01-19 ENCOUNTER — APPOINTMENT (OUTPATIENT)
Dept: INFUSION THERAPY | Facility: HOSPITAL | Age: 62
End: 2022-01-19

## 2022-01-19 VITALS
BODY MASS INDEX: 29.64 KG/M2 | RESPIRATION RATE: 16 BRPM | SYSTOLIC BLOOD PRESSURE: 144 MMHG | HEIGHT: 62.09 IN | OXYGEN SATURATION: 95 % | TEMPERATURE: 96.8 F | WEIGHT: 163.14 LBS | DIASTOLIC BLOOD PRESSURE: 86 MMHG | HEART RATE: 100 BPM

## 2022-01-19 DIAGNOSIS — Z51.11 ENCOUNTER FOR ANTINEOPLASTIC CHEMOTHERAPY: ICD-10-CM

## 2022-01-19 DIAGNOSIS — R11.2 NAUSEA WITH VOMITING, UNSPECIFIED: ICD-10-CM

## 2022-01-19 LAB
ALBUMIN SERPL ELPH-MCNC: 4.7 G/DL — SIGNIFICANT CHANGE UP (ref 3.3–5)
ALP SERPL-CCNC: 91 U/L — SIGNIFICANT CHANGE UP (ref 40–120)
ALT FLD-CCNC: 14 U/L — SIGNIFICANT CHANGE UP (ref 10–45)
ANION GAP SERPL CALC-SCNC: 12 MMOL/L — SIGNIFICANT CHANGE UP (ref 5–17)
AST SERPL-CCNC: 20 U/L — SIGNIFICANT CHANGE UP (ref 10–40)
BASOPHILS # BLD AUTO: 0.19 K/UL — SIGNIFICANT CHANGE UP (ref 0–0.2)
BASOPHILS NFR BLD AUTO: 1.6 % — SIGNIFICANT CHANGE UP (ref 0–2)
BILIRUB SERPL-MCNC: 0.3 MG/DL — SIGNIFICANT CHANGE UP (ref 0.2–1.2)
BUN SERPL-MCNC: 7 MG/DL — SIGNIFICANT CHANGE UP (ref 7–23)
CALCIUM SERPL-MCNC: 10.3 MG/DL — SIGNIFICANT CHANGE UP (ref 8.4–10.5)
CHLORIDE SERPL-SCNC: 104 MMOL/L — SIGNIFICANT CHANGE UP (ref 96–108)
CO2 SERPL-SCNC: 24 MMOL/L — SIGNIFICANT CHANGE UP (ref 22–31)
CREAT SERPL-MCNC: 0.7 MG/DL — SIGNIFICANT CHANGE UP (ref 0.5–1.3)
EOSINOPHIL # BLD AUTO: 1 K/UL — HIGH (ref 0–0.5)
EOSINOPHIL NFR BLD AUTO: 8.3 % — HIGH (ref 0–6)
GLUCOSE SERPL-MCNC: 91 MG/DL — SIGNIFICANT CHANGE UP (ref 70–99)
HCT VFR BLD CALC: 43.6 % — SIGNIFICANT CHANGE UP (ref 34.5–45)
HGB BLD-MCNC: 15 G/DL — SIGNIFICANT CHANGE UP (ref 11.5–15.5)
IMM GRANULOCYTES NFR BLD AUTO: 1.2 % — SIGNIFICANT CHANGE UP (ref 0–1.5)
LYMPHOCYTES # BLD AUTO: 2.79 K/UL — SIGNIFICANT CHANGE UP (ref 1–3.3)
LYMPHOCYTES # BLD AUTO: 23.2 % — SIGNIFICANT CHANGE UP (ref 13–44)
MCHC RBC-ENTMCNC: 31.8 PG — SIGNIFICANT CHANGE UP (ref 27–34)
MCHC RBC-ENTMCNC: 34.4 G/DL — SIGNIFICANT CHANGE UP (ref 32–36)
MCV RBC AUTO: 92.4 FL — SIGNIFICANT CHANGE UP (ref 80–100)
MONOCYTES # BLD AUTO: 1.03 K/UL — HIGH (ref 0–0.9)
MONOCYTES NFR BLD AUTO: 8.6 % — SIGNIFICANT CHANGE UP (ref 2–14)
NEUTROPHILS # BLD AUTO: 6.86 K/UL — SIGNIFICANT CHANGE UP (ref 1.8–7.4)
NEUTROPHILS NFR BLD AUTO: 57.1 % — SIGNIFICANT CHANGE UP (ref 43–77)
NRBC # BLD: 0 /100 WBCS — SIGNIFICANT CHANGE UP (ref 0–0)
PLATELET # BLD AUTO: 308 K/UL — SIGNIFICANT CHANGE UP (ref 150–400)
POTASSIUM SERPL-MCNC: 4.1 MMOL/L — SIGNIFICANT CHANGE UP (ref 3.5–5.3)
POTASSIUM SERPL-SCNC: 4.1 MMOL/L — SIGNIFICANT CHANGE UP (ref 3.5–5.3)
PROT SERPL-MCNC: 7.9 G/DL — SIGNIFICANT CHANGE UP (ref 6–8.3)
RBC # BLD: 4.72 M/UL — SIGNIFICANT CHANGE UP (ref 3.8–5.2)
RBC # FLD: 12.9 % — SIGNIFICANT CHANGE UP (ref 10.3–14.5)
SODIUM SERPL-SCNC: 140 MMOL/L — SIGNIFICANT CHANGE UP (ref 135–145)
WBC # BLD: 12.01 K/UL — HIGH (ref 3.8–10.5)
WBC # FLD AUTO: 12.01 K/UL — HIGH (ref 3.8–10.5)

## 2022-01-19 PROCEDURE — 99214 OFFICE O/P EST MOD 30 MIN: CPT

## 2022-03-02 ENCOUNTER — APPOINTMENT (OUTPATIENT)
Dept: HEMATOLOGY ONCOLOGY | Facility: CLINIC | Age: 62
End: 2022-03-02

## 2022-03-04 ENCOUNTER — RESULT REVIEW (OUTPATIENT)
Age: 62
End: 2022-03-04

## 2022-03-17 ENCOUNTER — OUTPATIENT (OUTPATIENT)
Dept: OUTPATIENT SERVICES | Facility: HOSPITAL | Age: 62
LOS: 1 days | End: 2022-03-17
Payer: COMMERCIAL

## 2022-03-17 ENCOUNTER — APPOINTMENT (OUTPATIENT)
Dept: CT IMAGING | Facility: IMAGING CENTER | Age: 62
End: 2022-03-17
Payer: COMMERCIAL

## 2022-03-17 DIAGNOSIS — Z98.890 OTHER SPECIFIED POSTPROCEDURAL STATES: Chronic | ICD-10-CM

## 2022-03-17 DIAGNOSIS — Z00.8 ENCOUNTER FOR OTHER GENERAL EXAMINATION: ICD-10-CM

## 2022-03-17 DIAGNOSIS — O03.9 COMPLETE OR UNSPECIFIED SPONTANEOUS ABORTION WITHOUT COMPLICATION: Chronic | ICD-10-CM

## 2022-03-17 DIAGNOSIS — C34.12 MALIGNANT NEOPLASM OF UPPER LOBE, LEFT BRONCHUS OR LUNG: ICD-10-CM

## 2022-03-17 DIAGNOSIS — Z85.118 PERSONAL HISTORY OF OTHER MALIGNANT NEOPLASM OF BRONCHUS AND LUNG: Chronic | ICD-10-CM

## 2022-03-17 PROCEDURE — 71260 CT THORAX DX C+: CPT | Mod: 26

## 2022-03-17 PROCEDURE — 71260 CT THORAX DX C+: CPT

## 2022-03-17 PROCEDURE — 74177 CT ABD & PELVIS W/CONTRAST: CPT

## 2022-03-17 PROCEDURE — 74177 CT ABD & PELVIS W/CONTRAST: CPT | Mod: 26

## 2022-03-21 ENCOUNTER — OUTPATIENT (OUTPATIENT)
Dept: OUTPATIENT SERVICES | Facility: HOSPITAL | Age: 62
LOS: 1 days | Discharge: HOME | End: 2022-03-21

## 2022-03-21 DIAGNOSIS — O03.9 COMPLETE OR UNSPECIFIED SPONTANEOUS ABORTION WITHOUT COMPLICATION: Chronic | ICD-10-CM

## 2022-03-21 DIAGNOSIS — C34.90 MALIGNANT NEOPLASM OF UNSPECIFIED PART OF UNSPECIFIED BRONCHUS OR LUNG: ICD-10-CM

## 2022-03-21 DIAGNOSIS — Z98.890 OTHER SPECIFIED POSTPROCEDURAL STATES: Chronic | ICD-10-CM

## 2022-03-21 DIAGNOSIS — Z85.118 PERSONAL HISTORY OF OTHER MALIGNANT NEOPLASM OF BRONCHUS AND LUNG: Chronic | ICD-10-CM

## 2022-03-23 ENCOUNTER — RESULT REVIEW (OUTPATIENT)
Age: 62
End: 2022-03-23

## 2022-03-23 ENCOUNTER — NON-APPOINTMENT (OUTPATIENT)
Age: 62
End: 2022-03-23

## 2022-03-23 ENCOUNTER — APPOINTMENT (OUTPATIENT)
Dept: HEMATOLOGY ONCOLOGY | Facility: CLINIC | Age: 62
End: 2022-03-23
Payer: COMMERCIAL

## 2022-03-23 ENCOUNTER — APPOINTMENT (OUTPATIENT)
Dept: INFUSION THERAPY | Facility: HOSPITAL | Age: 62
End: 2022-03-23

## 2022-03-23 VITALS
HEIGHT: 62.09 IN | RESPIRATION RATE: 16 BRPM | TEMPERATURE: 97.3 F | BODY MASS INDEX: 29.24 KG/M2 | OXYGEN SATURATION: 92 % | HEART RATE: 92 BPM | WEIGHT: 160.94 LBS

## 2022-03-23 DIAGNOSIS — Z51.11 ENCOUNTER FOR ANTINEOPLASTIC CHEMOTHERAPY: ICD-10-CM

## 2022-03-23 DIAGNOSIS — R11.2 NAUSEA WITH VOMITING, UNSPECIFIED: ICD-10-CM

## 2022-03-23 LAB
ALBUMIN SERPL ELPH-MCNC: 4.4 G/DL — SIGNIFICANT CHANGE UP (ref 3.3–5)
ALP SERPL-CCNC: 80 U/L — SIGNIFICANT CHANGE UP (ref 40–120)
ALT FLD-CCNC: 13 U/L — SIGNIFICANT CHANGE UP (ref 10–45)
ANION GAP SERPL CALC-SCNC: 12 MMOL/L — SIGNIFICANT CHANGE UP (ref 5–17)
AST SERPL-CCNC: 17 U/L — SIGNIFICANT CHANGE UP (ref 10–40)
BASOPHILS # BLD AUTO: 0.12 K/UL — SIGNIFICANT CHANGE UP (ref 0–0.2)
BASOPHILS NFR BLD AUTO: 1 % — SIGNIFICANT CHANGE UP (ref 0–2)
BILIRUB SERPL-MCNC: 0.2 MG/DL — SIGNIFICANT CHANGE UP (ref 0.2–1.2)
BUN SERPL-MCNC: 10 MG/DL — SIGNIFICANT CHANGE UP (ref 7–23)
CALCIUM SERPL-MCNC: 9.8 MG/DL — SIGNIFICANT CHANGE UP (ref 8.4–10.5)
CHLORIDE SERPL-SCNC: 107 MMOL/L — SIGNIFICANT CHANGE UP (ref 96–108)
CO2 SERPL-SCNC: 22 MMOL/L — SIGNIFICANT CHANGE UP (ref 22–31)
CREAT SERPL-MCNC: 0.6 MG/DL — SIGNIFICANT CHANGE UP (ref 0.5–1.3)
EGFR: 103 ML/MIN/1.73M2 — SIGNIFICANT CHANGE UP
EOSINOPHIL # BLD AUTO: 0.57 K/UL — HIGH (ref 0–0.5)
EOSINOPHIL NFR BLD AUTO: 4.6 % — SIGNIFICANT CHANGE UP (ref 0–6)
GLUCOSE SERPL-MCNC: 91 MG/DL — SIGNIFICANT CHANGE UP (ref 70–99)
HCT VFR BLD CALC: 43.5 % — SIGNIFICANT CHANGE UP (ref 34.5–45)
HGB BLD-MCNC: 14.7 G/DL — SIGNIFICANT CHANGE UP (ref 11.5–15.5)
IMM GRANULOCYTES NFR BLD AUTO: 0.3 % — SIGNIFICANT CHANGE UP (ref 0–1.5)
LYMPHOCYTES # BLD AUTO: 2.86 K/UL — SIGNIFICANT CHANGE UP (ref 1–3.3)
LYMPHOCYTES # BLD AUTO: 23.1 % — SIGNIFICANT CHANGE UP (ref 13–44)
MCHC RBC-ENTMCNC: 31.3 PG — SIGNIFICANT CHANGE UP (ref 27–34)
MCHC RBC-ENTMCNC: 33.8 G/DL — SIGNIFICANT CHANGE UP (ref 32–36)
MCV RBC AUTO: 92.6 FL — SIGNIFICANT CHANGE UP (ref 80–100)
MONOCYTES # BLD AUTO: 1.16 K/UL — HIGH (ref 0–0.9)
MONOCYTES NFR BLD AUTO: 9.4 % — SIGNIFICANT CHANGE UP (ref 2–14)
NEUTROPHILS # BLD AUTO: 7.61 K/UL — HIGH (ref 1.8–7.4)
NEUTROPHILS NFR BLD AUTO: 61.6 % — SIGNIFICANT CHANGE UP (ref 43–77)
NRBC # BLD: 0 /100 WBCS — SIGNIFICANT CHANGE UP (ref 0–0)
PLATELET # BLD AUTO: 292 K/UL — SIGNIFICANT CHANGE UP (ref 150–400)
POTASSIUM SERPL-MCNC: 4.4 MMOL/L — SIGNIFICANT CHANGE UP (ref 3.5–5.3)
POTASSIUM SERPL-SCNC: 4.4 MMOL/L — SIGNIFICANT CHANGE UP (ref 3.5–5.3)
PROT SERPL-MCNC: 7.4 G/DL — SIGNIFICANT CHANGE UP (ref 6–8.3)
RBC # BLD: 4.7 M/UL — SIGNIFICANT CHANGE UP (ref 3.8–5.2)
RBC # FLD: 12.6 % — SIGNIFICANT CHANGE UP (ref 10.3–14.5)
SODIUM SERPL-SCNC: 141 MMOL/L — SIGNIFICANT CHANGE UP (ref 135–145)
WBC # BLD: 12.36 K/UL — HIGH (ref 3.8–10.5)
WBC # FLD AUTO: 12.36 K/UL — HIGH (ref 3.8–10.5)

## 2022-03-23 PROCEDURE — 99214 OFFICE O/P EST MOD 30 MIN: CPT

## 2022-03-24 ENCOUNTER — NON-APPOINTMENT (OUTPATIENT)
Age: 62
End: 2022-03-24

## 2022-03-24 NOTE — ASSESSMENT
[FreeTextEntry1] : Originally stage III NSCLC with adenocarcinoma histology s/p sequential chemo/RT completed June 2013.  Received SBRT to an enlarging MAICOL nodule in March 2016.  Surveillance CT scan in March 2019 with enlarging MAICOL nodule which was hypermetabolic on PET/CT with concern for LN involvement.  Underwent Bronch with EBUS biopsy on 4/25/19 with a Level 7 LN biopsy confirming adenocarcinoma consistent with lung primary.  Tumor tested PDL1 negative, tested TMB-High and molecular testing is negative for actionable mutations (TP53, among others).  Started systemic therapy with Carbo/Pem/Pembro in late May 2019.  Developed Carboplatin infusion reaction during C2 which was then discontinued.  Treated with Pem/Pembro going forward.  Achieved SD after C3.  Treated with maintenance Pem/Pembro with sustained response.  Altered to q6 week administration due to tolerability issues in July 2020 at patient’s insistence.  \par Restaging CT C/A/P March 2022 with overall sustained response and stable findings from prior.  \par Recommend: \par -Continue with combination Pem/Pembro as maintenance for as long as it benefits the patient administered q6 weeks due to tolerability issues.  Receives the q6 week dosing of Pembro (400mg) and usual Pemetrexed 500mg/m2 dosing at this interval \par -Repeat labs today\par -Continue Vit B12 q2 cycles and daily folic acid with Dex in the kathy-chemo setting.  \par -Insomnia: zolpidem ER or Clonazepam PRN (latter prescribed by PCP)\par -Hypothyroidism: On Levothyroxine. Will continue to monitor TFT’s \par -Metoclopramide PRN nausea.  \par -F/U prior to next cycle or sooner should problems arise; will plan to obtain her next restaging scan in June or July if she remains clinically stable \par -Check out form given to patient with instructions for making appointments

## 2022-03-24 NOTE — HISTORY OF PRESENT ILLNESS
[Disease: _____________________] : Disease: [unfilled] [de-identified] : 55F who was diagnosed with lung cancer in 2012.  She had been having recurrent URI's with wheezing.  CXR showed a nodule and she therefore had a CT scan and PET/CT scan revealing a 1.3cm left apical soft tissue tumor with hypermetabolism in left hilum, left AP window and right azygoesophageal recess.  Patient underwent EBUS with biopys of left paratracheal LN with pathology consistent with adenocarcinoma of lung primary.  She was considered to have Stage IIIA/B disease (possibly N3 disease based on right sided findings).  She was treated with sequential chemo/RT with Cisplatin and Pemetrexed chemo begun in January 2013 and RT completed in June 2013.  She was followed with observation.  Underwent SBRT to MAICOL nodule in March 2016.  Surveillance CT chest on 3/11/19 revealed an enlarging MAICOL nodule.  She was referred back to Rad-Onc.  PET/CT was obtained in late March 2019 revealing this nodule to be hypermetabolic with concern for a lower hilar LN involvement.  She is now s/p EBUS on 4/25/19 with biopsy of a Level 7 LN that is positive for metastatic adenocarcinoma consistent with lung primary.  Tumor tested PDL1 negative and contains no actionable mutations.  Started systemic therapy with Carbo/Pem/Pembro in late May 2019.  Developed Carboplatin hypersensitivity reaction during C2 and Carbo was discontinued.  Treated with Pemetrexed + Pembrolizumab going forward.  Achieved IA.  Altered to q6 week administration at patient’s insistence due to tolerability issues in July 2020.   [de-identified] : Adenocarcinoma [de-identified] : Patient presents prior to continued treatment with Pem/Pembro; achieved [place]. \par \par Patient reports fatigue.  Reports nausea is greatly improved and no longer an issue.  Appetite and weight are stable.  Continues to work full-time.  She had an enjoyable time at a family wedding earlier this month.  \par \par Restaging CT with sustained response.

## 2022-03-24 NOTE — PHYSICAL EXAM
[Restricted in physically strenuous activity but ambulatory and able to carry out work of a light or sedentary nature] : Status 1- Restricted in physically strenuous activity but ambulatory and able to carry out work of a light or sedentary nature, e.g., light house work, office work [Normal] : affect appropriate [de-identified] : No icterus [de-identified] : MMM O/P clear  [de-identified] : No LAD  [de-identified] : No edema  [de-identified] : No spine/CVA tenderness  [de-identified] : Ambulatory

## 2022-03-24 NOTE — RESULTS/DATA
[FreeTextEntry1] : -CT Chest 3/11/19:  0.8 cm lobulated nodule in the apical segment of the left upper lobe has slightly increased in size when compared to previous exam. \par \par -PET/CT 3/25/19:  Abnormal FDG-PET/CT scan. \par 1. Interval resolution of previously seen hypermetabolic left upper lung nodule with associated minimally FDG avid postradiation changes as compared to PET/CT from 10/20/2015, however with interval appearance of new hypermetabolic left upper lung nodule which is superiorly located. This is suspicious for malignancy, metastatic versus new lesion. \par 2. Interval increase in FDG avidity of previously seen left perihilar focus, with additional adjacent new foci. These are concerning for metastases. \par 3. Nonspecific diffuse esophageal hypermetabolism, not significantly changed, which may again reflect esophagitis. Please correlate clinically. \par 4. New mild diffuse hypermetabolism in the anterior oral cavity, difficult to delineate due to streak artifacts, probably physiologic salivary activity. Please correlate clinically. \par \par -Level 7 Lymph Node EBUS guided FNA 4/25/19:  Positive for malignant cells.  Metastatic Adenocarcinoma consistent with lung primary.  \par \par -CT Chest 5/9/19:  Left upper lobe 8 mm nodule may be slightly larger than the prior study. Malignancy should be considered. Multiple other bilateral lung nodules measuring up to 5 mm are unchanged.. Patchy left upper lobe consolidation is similar compared to several prior studies. \par \par -MRI Brain 5/13/19:  No evidence of brain metastases. Minimal small vessel white matter ischemic changes. No change since 12/19/2012. \par \par -CT Chest 7/15/19:  \par 1. The left upper lobe nodule has decreased in size and measures 5 mm. \par 2. The additional left upper lobe nodule and right upper lobe nodules are unchanged. \par 3. The left upper lobe opacities are unchanged since 5/9/2019. \par \par -CT Chest 9/23/19: \par 1. Since 7/15/2019, the patchy left upper lobe opacities are unchanged. \par 2. Since 7/15/2019, 5 mm left upper lobe nodule is unchanged. \par 3. Since 9/10/2018, the additional bilateral upper lobe nodules are unchanged. \par 4. Emphysema \par \par -CT Chest 11/29/19:  Left upper lobe previously described patchy opacity is unchanged since September 23, 2019. Interval resolution of a contiguous left upper lobe area of linear atelectasis adjacent to the anterior mediastinum. 5 mm right lower lobe nodular opacity new since September 23, 2019 may represent focus of impaction of a previously seen cyst. \par \par -MRI Brain 1/14/20: No mass or abnormal intracranial enhancement to suggest metastatic disease. \par \par -CT Chest 2/14/20:  Stable patchy opacity in the left upper lobe when compared to previous exam.  No significant change in the small ill-defined opacity in the superior segment of the right lower lobe when compared to previous exam. \par \par -CT Chest 5/29/20:  Stable exam. No new or enlarging lung nodule. \par \par -CT Chest 8/22/20:  8 mm right upper lobe nodule new since May 29, 2020 possibly due to opacification of a previously seen cyst with fluid. 3 month follow-up noncontrast chest CT is recommended for further evaluation given history of lung cancer. The other bilateral pulmonary nodules and the previously seen left upper lobe patchy opacity are unchanged. \par \par -CT Chest 11/13/20:  Patchy opacity in the left upper lobe is unchanged when compared to previous exams.  Stable 0.8 cm solid nodule in the right upper lobe when compared to previous exam.\par \par -CT Chest 2/8/21:  Previously described 7 mm right upper lobe pulmonary nodule demonstrate minimal if any interval decrease in size since November 13, 2020. Continued follow-up is recommended.  4 mm right upper lobe pulmonary nodule new since November 13, 2020. A 3 month follow-up noncontrast chest CT can be performed to ensure stability or resolution.  Previously described left upper lobe patchy opacity is unchanged.\par \par -CT Chest 6/11/21: Irregular bandlike opacity within left upper lobe representing posttreatment changes is stable. Previously described 4 mm right upper lobe nodule (series 2 image 44) is stable. Another 7 mm right upper lobe nodule is resolved. No new or enlarging nodule.\par \par -CT Chest 10/25/21:  No significant interval change. Stable irregular bandlike opacity within left upper lobe likely posttreatment changes. No new or enlarging nodule.  Emphysema-cystic lung disease.\par \par Images Reviewed/Interpreted:\par \par –CT C/A/P 3/17/22:  Stable posttreatment changes within left upper lobe. No new or enlarging nodule.  No evidence of metastatic disease within abdomen or pelvis.\par \par

## 2022-04-28 ENCOUNTER — OUTPATIENT (OUTPATIENT)
Dept: OUTPATIENT SERVICES | Facility: HOSPITAL | Age: 62
LOS: 1 days | Discharge: HOME | End: 2022-04-28

## 2022-04-28 DIAGNOSIS — Z85.118 PERSONAL HISTORY OF OTHER MALIGNANT NEOPLASM OF BRONCHUS AND LUNG: Chronic | ICD-10-CM

## 2022-04-28 DIAGNOSIS — O03.9 COMPLETE OR UNSPECIFIED SPONTANEOUS ABORTION WITHOUT COMPLICATION: Chronic | ICD-10-CM

## 2022-04-28 DIAGNOSIS — Z98.890 OTHER SPECIFIED POSTPROCEDURAL STATES: Chronic | ICD-10-CM

## 2022-04-28 DIAGNOSIS — C34.90 MALIGNANT NEOPLASM OF UNSPECIFIED PART OF UNSPECIFIED BRONCHUS OR LUNG: ICD-10-CM

## 2022-05-04 ENCOUNTER — RESULT REVIEW (OUTPATIENT)
Age: 62
End: 2022-05-04

## 2022-05-04 ENCOUNTER — APPOINTMENT (OUTPATIENT)
Dept: INFUSION THERAPY | Facility: HOSPITAL | Age: 62
End: 2022-05-04

## 2022-05-04 ENCOUNTER — APPOINTMENT (OUTPATIENT)
Dept: HEMATOLOGY ONCOLOGY | Facility: CLINIC | Age: 62
End: 2022-05-04
Payer: COMMERCIAL

## 2022-05-04 VITALS
HEIGHT: 62.13 IN | OXYGEN SATURATION: 95 % | TEMPERATURE: 98.1 F | WEIGHT: 162.26 LBS | RESPIRATION RATE: 16 BRPM | SYSTOLIC BLOOD PRESSURE: 142 MMHG | DIASTOLIC BLOOD PRESSURE: 75 MMHG | BODY MASS INDEX: 29.48 KG/M2 | HEART RATE: 86 BPM

## 2022-05-04 DIAGNOSIS — R11.2 NAUSEA WITH VOMITING, UNSPECIFIED: ICD-10-CM

## 2022-05-04 DIAGNOSIS — Z51.11 ENCOUNTER FOR ANTINEOPLASTIC CHEMOTHERAPY: ICD-10-CM

## 2022-05-04 LAB
ALBUMIN SERPL ELPH-MCNC: 4.5 G/DL — SIGNIFICANT CHANGE UP (ref 3.3–5)
ALP SERPL-CCNC: 89 U/L — SIGNIFICANT CHANGE UP (ref 40–120)
ALT FLD-CCNC: 15 U/L — SIGNIFICANT CHANGE UP (ref 10–45)
ANION GAP SERPL CALC-SCNC: 16 MMOL/L — SIGNIFICANT CHANGE UP (ref 5–17)
AST SERPL-CCNC: 19 U/L — SIGNIFICANT CHANGE UP (ref 10–40)
BASOPHILS # BLD AUTO: 0.13 K/UL — SIGNIFICANT CHANGE UP (ref 0–0.2)
BASOPHILS NFR BLD AUTO: 1.2 % — SIGNIFICANT CHANGE UP (ref 0–2)
BILIRUB SERPL-MCNC: 0.3 MG/DL — SIGNIFICANT CHANGE UP (ref 0.2–1.2)
BUN SERPL-MCNC: 10 MG/DL — SIGNIFICANT CHANGE UP (ref 7–23)
CALCIUM SERPL-MCNC: 9.9 MG/DL — SIGNIFICANT CHANGE UP (ref 8.4–10.5)
CHLORIDE SERPL-SCNC: 104 MMOL/L — SIGNIFICANT CHANGE UP (ref 96–108)
CO2 SERPL-SCNC: 20 MMOL/L — LOW (ref 22–31)
CREAT SERPL-MCNC: 0.59 MG/DL — SIGNIFICANT CHANGE UP (ref 0.5–1.3)
EGFR: 103 ML/MIN/1.73M2 — SIGNIFICANT CHANGE UP
EOSINOPHIL # BLD AUTO: 0.55 K/UL — HIGH (ref 0–0.5)
EOSINOPHIL NFR BLD AUTO: 5.1 % — SIGNIFICANT CHANGE UP (ref 0–6)
GLUCOSE SERPL-MCNC: 88 MG/DL — SIGNIFICANT CHANGE UP (ref 70–99)
HCT VFR BLD CALC: 44.5 % — SIGNIFICANT CHANGE UP (ref 34.5–45)
HGB BLD-MCNC: 14.8 G/DL — SIGNIFICANT CHANGE UP (ref 11.5–15.5)
IMM GRANULOCYTES NFR BLD AUTO: 0.4 % — SIGNIFICANT CHANGE UP (ref 0–1.5)
LYMPHOCYTES # BLD AUTO: 2.52 K/UL — SIGNIFICANT CHANGE UP (ref 1–3.3)
LYMPHOCYTES # BLD AUTO: 23.5 % — SIGNIFICANT CHANGE UP (ref 13–44)
MCHC RBC-ENTMCNC: 31 PG — SIGNIFICANT CHANGE UP (ref 27–34)
MCHC RBC-ENTMCNC: 33.3 G/DL — SIGNIFICANT CHANGE UP (ref 32–36)
MCV RBC AUTO: 93.3 FL — SIGNIFICANT CHANGE UP (ref 80–100)
MONOCYTES # BLD AUTO: 0.92 K/UL — HIGH (ref 0–0.9)
MONOCYTES NFR BLD AUTO: 8.6 % — SIGNIFICANT CHANGE UP (ref 2–14)
NEUTROPHILS # BLD AUTO: 6.57 K/UL — SIGNIFICANT CHANGE UP (ref 1.8–7.4)
NEUTROPHILS NFR BLD AUTO: 61.2 % — SIGNIFICANT CHANGE UP (ref 43–77)
NRBC # BLD: 0 /100 WBCS — SIGNIFICANT CHANGE UP (ref 0–0)
PLATELET # BLD AUTO: 327 K/UL — SIGNIFICANT CHANGE UP (ref 150–400)
POTASSIUM SERPL-MCNC: 4.4 MMOL/L — SIGNIFICANT CHANGE UP (ref 3.5–5.3)
POTASSIUM SERPL-SCNC: 4.4 MMOL/L — SIGNIFICANT CHANGE UP (ref 3.5–5.3)
PROT SERPL-MCNC: 7.6 G/DL — SIGNIFICANT CHANGE UP (ref 6–8.3)
RBC # BLD: 4.77 M/UL — SIGNIFICANT CHANGE UP (ref 3.8–5.2)
RBC # FLD: 13.1 % — SIGNIFICANT CHANGE UP (ref 10.3–14.5)
SODIUM SERPL-SCNC: 140 MMOL/L — SIGNIFICANT CHANGE UP (ref 135–145)
T4 FREE SERPL-MCNC: 1.6 NG/DL — SIGNIFICANT CHANGE UP (ref 0.9–1.8)
TSH SERPL-MCNC: 2.73 UIU/ML — SIGNIFICANT CHANGE UP (ref 0.27–4.2)
WBC # BLD: 10.73 K/UL — HIGH (ref 3.8–10.5)
WBC # FLD AUTO: 10.73 K/UL — HIGH (ref 3.8–10.5)

## 2022-05-04 PROCEDURE — 99214 OFFICE O/P EST MOD 30 MIN: CPT

## 2022-05-10 NOTE — HISTORY OF PRESENT ILLNESS
[Disease: _____________________] : Disease: [unfilled] [de-identified] : 55F who was diagnosed with lung cancer in 2012.  She had been having recurrent URI's with wheezing.  CXR showed a nodule and she therefore had a CT scan and PET/CT scan revealing a 1.3cm left apical soft tissue tumor with hypermetabolism in left hilum, left AP window and right azygoesophageal recess.  Patient underwent EBUS with biopys of left paratracheal LN with pathology consistent with adenocarcinoma of lung primary.  She was considered to have Stage IIIA/B disease (possibly N3 disease based on right sided findings).  She was treated with sequential chemo/RT with Cisplatin and Pemetrexed chemo begun in January 2013 and RT completed in June 2013.  She was followed with observation.  Underwent SBRT to MAICOL nodule in March 2016.  Surveillance CT chest on 3/11/19 revealed an enlarging MAICOL nodule.  She was referred back to Rad-Onc.  PET/CT was obtained in late March 2019 revealing this nodule to be hypermetabolic with concern for a lower hilar LN involvement.  She is now s/p EBUS on 4/25/19 with biopsy of a Level 7 LN that is positive for metastatic adenocarcinoma consistent with lung primary.  Tumor tested PDL1 negative and contains no actionable mutations.  Started systemic therapy with Carbo/Pem/Pembro in late May 2019.  Developed Carboplatin hypersensitivity reaction during C2 and Carbo was discontinued.  Treated with Pemetrexed + Pembrolizumab going forward.  Achieved [place].  Altered to q6 week administration at patient’s insistence due to tolerability issues in July 2020.   [de-identified] : Adenocarcinoma [de-identified] : Patient presents prior to continued treatment with Pem/Pembro; achieved [place]. \par \par Patient reports fatigue.  Reports nausea is greatly improved and no longer an issue.  Appetite and weight are stable.  Continues to work full-time.  She reports that she may skip her next treatment; advised against this.  Discussed the possibility that she may be depressed and discussed possibility of starting an anti-depressant; she endorsed that she may have depression but wanted to discuss it further with her PCP.

## 2022-05-10 NOTE — RESULTS/DATA
[FreeTextEntry1] : -CT Chest 3/11/19:  0.8 cm lobulated nodule in the apical segment of the left upper lobe has slightly increased in size when compared to previous exam. \par \par -PET/CT 3/25/19:  Abnormal FDG-PET/CT scan. \par 1. Interval resolution of previously seen hypermetabolic left upper lung nodule with associated minimally FDG avid postradiation changes as compared to PET/CT from 10/20/2015, however with interval appearance of new hypermetabolic left upper lung nodule which is superiorly located. This is suspicious for malignancy, metastatic versus new lesion. \par 2. Interval increase in FDG avidity of previously seen left perihilar focus, with additional adjacent new foci. These are concerning for metastases. \par 3. Nonspecific diffuse esophageal hypermetabolism, not significantly changed, which may again reflect esophagitis. Please correlate clinically. \par 4. New mild diffuse hypermetabolism in the anterior oral cavity, difficult to delineate due to streak artifacts, probably physiologic salivary activity. Please correlate clinically. \par \par -Level 7 Lymph Node EBUS guided FNA 4/25/19:  Positive for malignant cells.  Metastatic Adenocarcinoma consistent with lung primary.  \par \par -CT Chest 5/9/19:  Left upper lobe 8 mm nodule may be slightly larger than the prior study. Malignancy should be considered. Multiple other bilateral lung nodules measuring up to 5 mm are unchanged.. Patchy left upper lobe consolidation is similar compared to several prior studies. \par \par -MRI Brain 5/13/19:  No evidence of brain metastases. Minimal small vessel white matter ischemic changes. No change since 12/19/2012. \par \par -CT Chest 7/15/19:  \par 1. The left upper lobe nodule has decreased in size and measures 5 mm. \par 2. The additional left upper lobe nodule and right upper lobe nodules are unchanged. \par 3. The left upper lobe opacities are unchanged since 5/9/2019. \par \par -CT Chest 9/23/19: \par 1. Since 7/15/2019, the patchy left upper lobe opacities are unchanged. \par 2. Since 7/15/2019, 5 mm left upper lobe nodule is unchanged. \par 3. Since 9/10/2018, the additional bilateral upper lobe nodules are unchanged. \par 4. Emphysema \par \par -CT Chest 11/29/19:  Left upper lobe previously described patchy opacity is unchanged since September 23, 2019. Interval resolution of a contiguous left upper lobe area of linear atelectasis adjacent to the anterior mediastinum. 5 mm right lower lobe nodular opacity new since September 23, 2019 may represent focus of impaction of a previously seen cyst. \par \par -MRI Brain 1/14/20: No mass or abnormal intracranial enhancement to suggest metastatic disease. \par \par -CT Chest 2/14/20:  Stable patchy opacity in the left upper lobe when compared to previous exam.  No significant change in the small ill-defined opacity in the superior segment of the right lower lobe when compared to previous exam. \par \par -CT Chest 5/29/20:  Stable exam. No new or enlarging lung nodule. \par \par -CT Chest 8/22/20:  8 mm right upper lobe nodule new since May 29, 2020 possibly due to opacification of a previously seen cyst with fluid. 3 month follow-up noncontrast chest CT is recommended for further evaluation given history of lung cancer. The other bilateral pulmonary nodules and the previously seen left upper lobe patchy opacity are unchanged. \par \par -CT Chest 11/13/20:  Patchy opacity in the left upper lobe is unchanged when compared to previous exams.  Stable 0.8 cm solid nodule in the right upper lobe when compared to previous exam.\par \par -CT Chest 2/8/21:  Previously described 7 mm right upper lobe pulmonary nodule demonstrate minimal if any interval decrease in size since November 13, 2020. Continued follow-up is recommended.  4 mm right upper lobe pulmonary nodule new since November 13, 2020. A 3 month follow-up noncontrast chest CT can be performed to ensure stability or resolution.  Previously described left upper lobe patchy opacity is unchanged.\par \par -CT Chest 6/11/21: Irregular bandlike opacity within left upper lobe representing posttreatment changes is stable. Previously described 4 mm right upper lobe nodule (series 2 image 44) is stable. Another 7 mm right upper lobe nodule is resolved. No new or enlarging nodule.\par \par -CT Chest 10/25/21:  No significant interval change. Stable irregular bandlike opacity within left upper lobe likely posttreatment changes. No new or enlarging nodule.  Emphysema-cystic lung disease.\par \par –CT C/A/P 3/17/22:  Stable posttreatment changes within left upper lobe. No new or enlarging nodule.  No evidence of metastatic disease within abdomen or pelvis.\par \par

## 2022-05-10 NOTE — ASSESSMENT
[FreeTextEntry1] : Originally stage III NSCLC with adenocarcinoma histology s/p sequential chemo/RT completed June 2013.  Received SBRT to an enlarging MAICOL nodule in March 2016.  Surveillance CT scan in March 2019 with enlarging MAICOL nodule which was hypermetabolic on PET/CT with concern for LN involvement.  Underwent Bronch with EBUS biopsy on 4/25/19 with a Level 7 LN biopsy confirming adenocarcinoma consistent with lung primary.  Tumor tested PDL1 negative, tested TMB-High and molecular testing is negative for actionable mutations (TP53, among others).  Started systemic therapy with Carbo/Pem/Pembro in late May 2019.  Developed Carboplatin infusion reaction during C2 which was then discontinued.  Treated with Pem/Pembro going forward.  Achieved [place] after C3.  Treated with maintenance Pem/Pembro with sustained response.  Altered to q6 week administration due to tolerability issues in July 2020 at patient’s insistence.  \par Restaging CT C/A/P March 2022 with overall sustained response and stable findings from prior.  \par Recommend: \par -Continue with combination Pem/Pembro as maintenance for as long as it benefits the patient administered q6 weeks due to tolerability issues.  Receives the q6 week dosing of Pembro (400mg) and usual Pemetrexed 500mg/m2 dosing at this interval \par -Repeat labs today\par -Continue Vit B12 q2 cycles and daily folic acid with Dex in the kathy-chemo setting.  \par -Insomnia: zolpidem ER or Clonazepam PRN (latter prescribed by PCP)\par -Hypothyroidism: On Levothyroxine. Will continue to monitor TFT’s \par -Metoclopramide PRN nausea.  \par -Consider antidepressant; she wants to d/w her PCP\par -F/U prior to next cycle or sooner should problems arise; will plan to obtain her next restaging scan in July if she remains clinically stable \par -Check out form given to patient with instructions for making appointments

## 2022-05-10 NOTE — PHYSICAL EXAM
[Restricted in physically strenuous activity but ambulatory and able to carry out work of a light or sedentary nature] : Status 1- Restricted in physically strenuous activity but ambulatory and able to carry out work of a light or sedentary nature, e.g., light house work, office work [Normal] : affect appropriate [de-identified] : No icterus [de-identified] : MMM O/P clear  [de-identified] : No LAD  [de-identified] : No edema  [de-identified] : No spine/CVA tenderness  [de-identified] : Ambulatory

## 2022-06-08 ENCOUNTER — OUTPATIENT (OUTPATIENT)
Dept: OUTPATIENT SERVICES | Facility: HOSPITAL | Age: 62
LOS: 1 days | Discharge: HOME | End: 2022-06-08

## 2022-06-08 DIAGNOSIS — C34.90 MALIGNANT NEOPLASM OF UNSPECIFIED PART OF UNSPECIFIED BRONCHUS OR LUNG: ICD-10-CM

## 2022-06-08 DIAGNOSIS — Z98.890 OTHER SPECIFIED POSTPROCEDURAL STATES: Chronic | ICD-10-CM

## 2022-06-08 DIAGNOSIS — Z85.118 PERSONAL HISTORY OF OTHER MALIGNANT NEOPLASM OF BRONCHUS AND LUNG: Chronic | ICD-10-CM

## 2022-06-08 DIAGNOSIS — O03.9 COMPLETE OR UNSPECIFIED SPONTANEOUS ABORTION WITHOUT COMPLICATION: Chronic | ICD-10-CM

## 2022-06-15 ENCOUNTER — APPOINTMENT (OUTPATIENT)
Dept: HEMATOLOGY ONCOLOGY | Facility: CLINIC | Age: 62
End: 2022-06-15

## 2022-06-15 ENCOUNTER — NON-APPOINTMENT (OUTPATIENT)
Age: 62
End: 2022-06-15

## 2022-06-15 ENCOUNTER — APPOINTMENT (OUTPATIENT)
Dept: INFUSION THERAPY | Facility: HOSPITAL | Age: 62
End: 2022-06-15

## 2022-07-27 ENCOUNTER — APPOINTMENT (OUTPATIENT)
Dept: HEMATOLOGY ONCOLOGY | Facility: CLINIC | Age: 62
End: 2022-07-27

## 2022-07-27 ENCOUNTER — RESULT REVIEW (OUTPATIENT)
Age: 62
End: 2022-07-27

## 2022-07-27 ENCOUNTER — APPOINTMENT (OUTPATIENT)
Dept: INFUSION THERAPY | Facility: HOSPITAL | Age: 62
End: 2022-07-27

## 2022-07-27 ENCOUNTER — NON-APPOINTMENT (OUTPATIENT)
Age: 62
End: 2022-07-27

## 2022-07-27 VITALS
HEART RATE: 84 BPM | DIASTOLIC BLOOD PRESSURE: 83 MMHG | WEIGHT: 161.38 LBS | BODY MASS INDEX: 29.32 KG/M2 | RESPIRATION RATE: 16 BRPM | SYSTOLIC BLOOD PRESSURE: 139 MMHG | TEMPERATURE: 97.4 F | HEIGHT: 62.13 IN | OXYGEN SATURATION: 98 %

## 2022-07-27 DIAGNOSIS — R11.2 NAUSEA WITH VOMITING, UNSPECIFIED: ICD-10-CM

## 2022-07-27 DIAGNOSIS — Z51.11 ENCOUNTER FOR ANTINEOPLASTIC CHEMOTHERAPY: ICD-10-CM

## 2022-07-27 LAB
ALBUMIN SERPL ELPH-MCNC: 4.3 G/DL — SIGNIFICANT CHANGE UP (ref 3.3–5)
ALP SERPL-CCNC: 81 U/L — SIGNIFICANT CHANGE UP (ref 40–120)
ALT FLD-CCNC: 18 U/L — SIGNIFICANT CHANGE UP (ref 10–45)
ANION GAP SERPL CALC-SCNC: 12 MMOL/L — SIGNIFICANT CHANGE UP (ref 5–17)
AST SERPL-CCNC: 21 U/L — SIGNIFICANT CHANGE UP (ref 10–40)
BASOPHILS # BLD AUTO: 0.1 K/UL — SIGNIFICANT CHANGE UP (ref 0–0.2)
BASOPHILS NFR BLD AUTO: 1 % — SIGNIFICANT CHANGE UP (ref 0–2)
BILIRUB SERPL-MCNC: 0.2 MG/DL — SIGNIFICANT CHANGE UP (ref 0.2–1.2)
BUN SERPL-MCNC: 9 MG/DL — SIGNIFICANT CHANGE UP (ref 7–23)
CALCIUM SERPL-MCNC: 9.8 MG/DL — SIGNIFICANT CHANGE UP (ref 8.4–10.5)
CHLORIDE SERPL-SCNC: 107 MMOL/L — SIGNIFICANT CHANGE UP (ref 96–108)
CO2 SERPL-SCNC: 21 MMOL/L — LOW (ref 22–31)
CREAT SERPL-MCNC: 0.64 MG/DL — SIGNIFICANT CHANGE UP (ref 0.5–1.3)
EGFR: 101 ML/MIN/1.73M2 — SIGNIFICANT CHANGE UP
EOSINOPHIL # BLD AUTO: 0.51 K/UL — HIGH (ref 0–0.5)
EOSINOPHIL NFR BLD AUTO: 4.9 % — SIGNIFICANT CHANGE UP (ref 0–6)
GLUCOSE SERPL-MCNC: 131 MG/DL — HIGH (ref 70–99)
HCT VFR BLD CALC: 41.7 % — SIGNIFICANT CHANGE UP (ref 34.5–45)
HGB BLD-MCNC: 14.4 G/DL — SIGNIFICANT CHANGE UP (ref 11.5–15.5)
IMM GRANULOCYTES NFR BLD AUTO: 0.3 % — SIGNIFICANT CHANGE UP (ref 0–1.5)
LYMPHOCYTES # BLD AUTO: 2.43 K/UL — SIGNIFICANT CHANGE UP (ref 1–3.3)
LYMPHOCYTES # BLD AUTO: 23.3 % — SIGNIFICANT CHANGE UP (ref 13–44)
MCHC RBC-ENTMCNC: 31.8 PG — SIGNIFICANT CHANGE UP (ref 27–34)
MCHC RBC-ENTMCNC: 34.5 G/DL — SIGNIFICANT CHANGE UP (ref 32–36)
MCV RBC AUTO: 92.1 FL — SIGNIFICANT CHANGE UP (ref 80–100)
MONOCYTES # BLD AUTO: 0.76 K/UL — SIGNIFICANT CHANGE UP (ref 0–0.9)
MONOCYTES NFR BLD AUTO: 7.3 % — SIGNIFICANT CHANGE UP (ref 2–14)
NEUTROPHILS # BLD AUTO: 6.62 K/UL — SIGNIFICANT CHANGE UP (ref 1.8–7.4)
NEUTROPHILS NFR BLD AUTO: 63.2 % — SIGNIFICANT CHANGE UP (ref 43–77)
NRBC # BLD: 0 /100 WBCS — SIGNIFICANT CHANGE UP (ref 0–0)
PLATELET # BLD AUTO: 305 K/UL — SIGNIFICANT CHANGE UP (ref 150–400)
POTASSIUM SERPL-MCNC: 4 MMOL/L — SIGNIFICANT CHANGE UP (ref 3.5–5.3)
POTASSIUM SERPL-SCNC: 4 MMOL/L — SIGNIFICANT CHANGE UP (ref 3.5–5.3)
PROT SERPL-MCNC: 7.3 G/DL — SIGNIFICANT CHANGE UP (ref 6–8.3)
RBC # BLD: 4.53 M/UL — SIGNIFICANT CHANGE UP (ref 3.8–5.2)
RBC # FLD: 12.3 % — SIGNIFICANT CHANGE UP (ref 10.3–14.5)
SODIUM SERPL-SCNC: 140 MMOL/L — SIGNIFICANT CHANGE UP (ref 135–145)
T4 FREE SERPL-MCNC: 1.6 NG/DL — SIGNIFICANT CHANGE UP (ref 0.9–1.8)
TSH SERPL-MCNC: 1.07 UIU/ML — SIGNIFICANT CHANGE UP (ref 0.27–4.2)
WBC # BLD: 10.45 K/UL — SIGNIFICANT CHANGE UP (ref 3.8–10.5)
WBC # FLD AUTO: 10.45 K/UL — SIGNIFICANT CHANGE UP (ref 3.8–10.5)

## 2022-07-27 PROCEDURE — 99214 OFFICE O/P EST MOD 30 MIN: CPT

## 2022-07-28 NOTE — PHYSICAL EXAM
[Restricted in physically strenuous activity but ambulatory and able to carry out work of a light or sedentary nature] : Status 1- Restricted in physically strenuous activity but ambulatory and able to carry out work of a light or sedentary nature, e.g., light house work, office work [Normal] : affect appropriate [de-identified] : No icterus [de-identified] : MMM O/P clear  [de-identified] : No LAD  [de-identified] : No edema  [de-identified] : No spine/CVA tenderness  [de-identified] : Ambulatory

## 2022-07-28 NOTE — HISTORY OF PRESENT ILLNESS
[Disease: _____________________] : Disease: [unfilled] [de-identified] : 55F who was diagnosed with lung cancer in 2012.  She had been having recurrent URI's with wheezing.  CXR showed a nodule and she therefore had a CT scan and PET/CT scan revealing a 1.3cm left apical soft tissue tumor with hypermetabolism in left hilum, left AP window and right azygoesophageal recess.  Patient underwent EBUS with biopys of left paratracheal LN with pathology consistent with adenocarcinoma of lung primary.  She was considered to have Stage IIIA/B disease (possibly N3 disease based on right sided findings).  She was treated with sequential chemo/RT with Cisplatin and Pemetrexed chemo begun in January 2013 and RT completed in June 2013.  She was followed with observation.  Underwent SBRT to MAICOL nodule in March 2016.  Surveillance CT chest on 3/11/19 revealed an enlarging MAICOL nodule.  She was referred back to Rad-Onc.  PET/CT was obtained in late March 2019 revealing this nodule to be hypermetabolic with concern for a lower hilar LN involvement.  She is now s/p EBUS on 4/25/19 with biopsy of a Level 7 LN that is positive for metastatic adenocarcinoma consistent with lung primary.  Tumor tested PDL1 negative and contains no actionable mutations.  Started systemic therapy with Carbo/Pem/Pembro in late May 2019.  Developed Carboplatin hypersensitivity reaction during C2 and Carbo was discontinued.  Treated with Pemetrexed + Pembrolizumab going forward.  Achieved [place].  Altered to q6 week administration at patient’s insistence due to tolerability issues in July 2020.   [de-identified] : Adenocarcinoma [de-identified] : Patient presents prior to continued treatment with Pem/Pembro; achieved [place]. \par She did not show up for her last treatment and I encouraged her to call and cancel if she plans to do this in the future.\par Patient reports fatigue.  Appetite and weight are stable.  Continues to work full-time.

## 2022-07-28 NOTE — ASSESSMENT
[FreeTextEntry1] : Originally stage III NSCLC with adenocarcinoma histology s/p sequential chemo/RT completed June 2013.  Received SBRT to an enlarging MAICOL nodule in March 2016.  Surveillance CT scan in March 2019 with enlarging MAICOL nodule which was hypermetabolic on PET/CT with concern for LN involvement.  Underwent Bronch with EBUS biopsy on 4/25/19 with a Level 7 LN biopsy confirming adenocarcinoma consistent with lung primary.  Tumor tested PDL1 negative, tested TMB-High and molecular testing is negative for actionable mutations (TP53, among others).  Started systemic therapy with Carbo/Pem/Pembro in late May 2019.  Developed Carboplatin infusion reaction during C2 which was then discontinued.  Treated with Pem/Pembro going forward.  Achieved [place] after C3.  Treated with maintenance Pem/Pembro with sustained response.  Altered to q6 week administration due to tolerability issues in July 2020 at patient’s insistence.  \par Restaging CT C/A/P March 2022 with overall sustained response and stable findings from prior.  \par Recommend: \par -Continue with combination Pem/Pembro as maintenance for as long as it benefits the patient administered q6 weeks due to tolerability issues.  Receives the q6 week dosing of Pembro (400mg) and usual Pemetrexed 500mg/m2 dosing at this interval \par -Repeat labs today\par -Continue Vit B12 q2 cycles and daily folic acid with Dex in the kathy-chemo setting.  \par -Insomnia: zolpidem ER or Clonazepam PRN (latter prescribed by PCP)\par -Hypothyroidism: On Levothyroxine. Will continue to monitor TFT’s \par -Metoclopramide PRN nausea.  \par -Previously discussed considering antidepressant; she wants to d/w her PCP\par -F/U prior to next cycle or sooner should problems arise with restaging scan obtained beforehand\par -Check out form given to patient with instructions for making appointments

## 2022-09-01 ENCOUNTER — OUTPATIENT (OUTPATIENT)
Dept: OUTPATIENT SERVICES | Facility: HOSPITAL | Age: 62
LOS: 1 days | Discharge: HOME | End: 2022-09-01

## 2022-09-01 DIAGNOSIS — Z98.890 OTHER SPECIFIED POSTPROCEDURAL STATES: Chronic | ICD-10-CM

## 2022-09-01 DIAGNOSIS — O03.9 COMPLETE OR UNSPECIFIED SPONTANEOUS ABORTION WITHOUT COMPLICATION: Chronic | ICD-10-CM

## 2022-09-01 DIAGNOSIS — C34.90 MALIGNANT NEOPLASM OF UNSPECIFIED PART OF UNSPECIFIED BRONCHUS OR LUNG: ICD-10-CM

## 2022-09-01 DIAGNOSIS — Z85.118 PERSONAL HISTORY OF OTHER MALIGNANT NEOPLASM OF BRONCHUS AND LUNG: Chronic | ICD-10-CM

## 2022-09-02 ENCOUNTER — OUTPATIENT (OUTPATIENT)
Dept: OUTPATIENT SERVICES | Facility: HOSPITAL | Age: 62
LOS: 1 days | End: 2022-09-02
Payer: COMMERCIAL

## 2022-09-02 ENCOUNTER — APPOINTMENT (OUTPATIENT)
Dept: CT IMAGING | Facility: IMAGING CENTER | Age: 62
End: 2022-09-02

## 2022-09-02 DIAGNOSIS — C34.12 MALIGNANT NEOPLASM OF UPPER LOBE, LEFT BRONCHUS OR LUNG: ICD-10-CM

## 2022-09-02 DIAGNOSIS — O03.9 COMPLETE OR UNSPECIFIED SPONTANEOUS ABORTION WITHOUT COMPLICATION: Chronic | ICD-10-CM

## 2022-09-02 DIAGNOSIS — Z98.890 OTHER SPECIFIED POSTPROCEDURAL STATES: Chronic | ICD-10-CM

## 2022-09-02 DIAGNOSIS — Z85.118 PERSONAL HISTORY OF OTHER MALIGNANT NEOPLASM OF BRONCHUS AND LUNG: Chronic | ICD-10-CM

## 2022-09-02 PROCEDURE — 71250 CT THORAX DX C-: CPT

## 2022-09-02 PROCEDURE — 71250 CT THORAX DX C-: CPT | Mod: 26

## 2022-09-07 ENCOUNTER — APPOINTMENT (OUTPATIENT)
Dept: HEMATOLOGY ONCOLOGY | Facility: CLINIC | Age: 62
End: 2022-09-07

## 2022-09-07 ENCOUNTER — APPOINTMENT (OUTPATIENT)
Dept: INFUSION THERAPY | Facility: HOSPITAL | Age: 62
End: 2022-09-07

## 2022-09-07 ENCOUNTER — RESULT REVIEW (OUTPATIENT)
Age: 62
End: 2022-09-07

## 2022-09-07 VITALS
HEART RATE: 89 BPM | WEIGHT: 160.94 LBS | RESPIRATION RATE: 16 BRPM | OXYGEN SATURATION: 97 % | TEMPERATURE: 97.2 F | DIASTOLIC BLOOD PRESSURE: 85 MMHG | HEIGHT: 62.13 IN | BODY MASS INDEX: 29.24 KG/M2 | SYSTOLIC BLOOD PRESSURE: 131 MMHG

## 2022-09-07 DIAGNOSIS — R11.2 NAUSEA WITH VOMITING, UNSPECIFIED: ICD-10-CM

## 2022-09-07 DIAGNOSIS — Z51.11 ENCOUNTER FOR ANTINEOPLASTIC CHEMOTHERAPY: ICD-10-CM

## 2022-09-07 LAB
ALBUMIN SERPL ELPH-MCNC: 4.3 G/DL — SIGNIFICANT CHANGE UP (ref 3.3–5)
ALP SERPL-CCNC: 93 U/L — SIGNIFICANT CHANGE UP (ref 40–120)
ALT FLD-CCNC: 22 U/L — SIGNIFICANT CHANGE UP (ref 10–45)
ANION GAP SERPL CALC-SCNC: 14 MMOL/L — SIGNIFICANT CHANGE UP (ref 5–17)
AST SERPL-CCNC: 22 U/L — SIGNIFICANT CHANGE UP (ref 10–40)
BASOPHILS # BLD AUTO: 0.12 K/UL — SIGNIFICANT CHANGE UP (ref 0–0.2)
BASOPHILS NFR BLD AUTO: 1 % — SIGNIFICANT CHANGE UP (ref 0–2)
BILIRUB SERPL-MCNC: 0.2 MG/DL — SIGNIFICANT CHANGE UP (ref 0.2–1.2)
BUN SERPL-MCNC: 13 MG/DL — SIGNIFICANT CHANGE UP (ref 7–23)
CALCIUM SERPL-MCNC: 10.1 MG/DL — SIGNIFICANT CHANGE UP (ref 8.4–10.5)
CHLORIDE SERPL-SCNC: 104 MMOL/L — SIGNIFICANT CHANGE UP (ref 96–108)
CO2 SERPL-SCNC: 22 MMOL/L — SIGNIFICANT CHANGE UP (ref 22–31)
CREAT SERPL-MCNC: 0.64 MG/DL — SIGNIFICANT CHANGE UP (ref 0.5–1.3)
EGFR: 101 ML/MIN/1.73M2 — SIGNIFICANT CHANGE UP
EOSINOPHIL # BLD AUTO: 0.57 K/UL — HIGH (ref 0–0.5)
EOSINOPHIL NFR BLD AUTO: 4.6 % — SIGNIFICANT CHANGE UP (ref 0–6)
GLUCOSE SERPL-MCNC: 91 MG/DL — SIGNIFICANT CHANGE UP (ref 70–99)
HCT VFR BLD CALC: 42.4 % — SIGNIFICANT CHANGE UP (ref 34.5–45)
HGB BLD-MCNC: 14.9 G/DL — SIGNIFICANT CHANGE UP (ref 11.5–15.5)
IMM GRANULOCYTES NFR BLD AUTO: 0.3 % — SIGNIFICANT CHANGE UP (ref 0–1.5)
LYMPHOCYTES # BLD AUTO: 24.8 % — SIGNIFICANT CHANGE UP (ref 13–44)
LYMPHOCYTES # BLD AUTO: 3.04 K/UL — SIGNIFICANT CHANGE UP (ref 1–3.3)
MCHC RBC-ENTMCNC: 32.3 PG — SIGNIFICANT CHANGE UP (ref 27–34)
MCHC RBC-ENTMCNC: 35.1 G/DL — SIGNIFICANT CHANGE UP (ref 32–36)
MCV RBC AUTO: 91.8 FL — SIGNIFICANT CHANGE UP (ref 80–100)
MONOCYTES # BLD AUTO: 1.1 K/UL — HIGH (ref 0–0.9)
MONOCYTES NFR BLD AUTO: 9 % — SIGNIFICANT CHANGE UP (ref 2–14)
NEUTROPHILS # BLD AUTO: 7.39 K/UL — SIGNIFICANT CHANGE UP (ref 1.8–7.4)
NEUTROPHILS NFR BLD AUTO: 60.3 % — SIGNIFICANT CHANGE UP (ref 43–77)
NRBC # BLD: 0 /100 WBCS — SIGNIFICANT CHANGE UP (ref 0–0)
PLATELET # BLD AUTO: 328 K/UL — SIGNIFICANT CHANGE UP (ref 150–400)
POTASSIUM SERPL-MCNC: 4.3 MMOL/L — SIGNIFICANT CHANGE UP (ref 3.5–5.3)
POTASSIUM SERPL-SCNC: 4.3 MMOL/L — SIGNIFICANT CHANGE UP (ref 3.5–5.3)
PROT SERPL-MCNC: 7.8 G/DL — SIGNIFICANT CHANGE UP (ref 6–8.3)
RBC # BLD: 4.62 M/UL — SIGNIFICANT CHANGE UP (ref 3.8–5.2)
RBC # FLD: 13 % — SIGNIFICANT CHANGE UP (ref 10.3–14.5)
SODIUM SERPL-SCNC: 140 MMOL/L — SIGNIFICANT CHANGE UP (ref 135–145)
T4 FREE SERPL-MCNC: 1.6 NG/DL — SIGNIFICANT CHANGE UP (ref 0.9–1.8)
TSH SERPL-MCNC: 4.15 UIU/ML — SIGNIFICANT CHANGE UP (ref 0.27–4.2)
WBC # BLD: 12.26 K/UL — HIGH (ref 3.8–10.5)
WBC # FLD AUTO: 12.26 K/UL — HIGH (ref 3.8–10.5)

## 2022-09-07 PROCEDURE — 99214 OFFICE O/P EST MOD 30 MIN: CPT

## 2022-09-08 NOTE — HISTORY OF PRESENT ILLNESS
[Disease: _____________________] : Disease: [unfilled] [de-identified] : 55F who was diagnosed with lung cancer in 2012.  She had been having recurrent URI's with wheezing.  CXR showed a nodule and she therefore had a CT scan and PET/CT scan revealing a 1.3cm left apical soft tissue tumor with hypermetabolism in left hilum, left AP window and right azygoesophageal recess.  Patient underwent EBUS with biopys of left paratracheal LN with pathology consistent with adenocarcinoma of lung primary.  She was considered to have Stage IIIA/B disease (possibly N3 disease based on right sided findings).  She was treated with sequential chemo/RT with Cisplatin and Pemetrexed chemo begun in January 2013 and RT completed in June 2013.  She was followed with observation.  Underwent SBRT to MAICOL nodule in March 2016.  Surveillance CT chest on 3/11/19 revealed an enlarging MAICOL nodule.  She was referred back to Rad-Onc.  PET/CT was obtained in late March 2019 revealing this nodule to be hypermetabolic with concern for a lower hilar LN involvement.  She is now s/p EBUS on 4/25/19 with biopsy of a Level 7 LN that is positive for metastatic adenocarcinoma consistent with lung primary.  Tumor tested PDL1 negative and contains no actionable mutations.  Started systemic therapy with Carbo/Pem/Pembro in late May 2019.  Developed Carboplatin hypersensitivity reaction during C2 and Carbo was discontinued.  Treated with Pemetrexed + Pembrolizumab going forward.  Achieved [place].  Altered to q6 week administration at patient’s insistence due to tolerability issues in July 2020.   [de-identified] : Adenocarcinoma [de-identified] : Patient presents prior to continued treatment with Pem/Pembro; achieved [place]. \par Patient complains of significant fatigue.  Appetite and weight are stable.  Continues to work full-time.  Encouraged increased walking.  Discussed depression symptoms, which she previously reported she would address with her PCP.  She reports being prescribed a medication by her PCP however she has not started it yet and does not know what it is.  She inquired about being treated every 3 months and I discussed that this was not possible in order to maintain control of her disease.\par \par Restaging CT results were not available at the time of the visit, however, I called her on 9/8 and informed her that the CT scan revealed an overall sustained response.

## 2022-09-08 NOTE — RESULTS/DATA
[FreeTextEntry1] : -CT Chest 3/11/19:  0.8 cm lobulated nodule in the apical segment of the left upper lobe has slightly increased in size when compared to previous exam. \par \par -PET/CT 3/25/19:  Abnormal FDG-PET/CT scan. \par 1. Interval resolution of previously seen hypermetabolic left upper lung nodule with associated minimally FDG avid postradiation changes as compared to PET/CT from 10/20/2015, however with interval appearance of new hypermetabolic left upper lung nodule which is superiorly located. This is suspicious for malignancy, metastatic versus new lesion. \par 2. Interval increase in FDG avidity of previously seen left perihilar focus, with additional adjacent new foci. These are concerning for metastases. \par 3. Nonspecific diffuse esophageal hypermetabolism, not significantly changed, which may again reflect esophagitis. Please correlate clinically. \par 4. New mild diffuse hypermetabolism in the anterior oral cavity, difficult to delineate due to streak artifacts, probably physiologic salivary activity. Please correlate clinically. \par \par -Level 7 Lymph Node EBUS guided FNA 4/25/19:  Positive for malignant cells.  Metastatic Adenocarcinoma consistent with lung primary.  \par \par -CT Chest 5/9/19:  Left upper lobe 8 mm nodule may be slightly larger than the prior study. Malignancy should be considered. Multiple other bilateral lung nodules measuring up to 5 mm are unchanged.. Patchy left upper lobe consolidation is similar compared to several prior studies. \par \par -MRI Brain 5/13/19:  No evidence of brain metastases. Minimal small vessel white matter ischemic changes. No change since 12/19/2012. \par \par -CT Chest 7/15/19:  \par 1. The left upper lobe nodule has decreased in size and measures 5 mm. \par 2. The additional left upper lobe nodule and right upper lobe nodules are unchanged. \par 3. The left upper lobe opacities are unchanged since 5/9/2019. \par \par -CT Chest 9/23/19: \par 1. Since 7/15/2019, the patchy left upper lobe opacities are unchanged. \par 2. Since 7/15/2019, 5 mm left upper lobe nodule is unchanged. \par 3. Since 9/10/2018, the additional bilateral upper lobe nodules are unchanged. \par 4. Emphysema \par \par -CT Chest 11/29/19:  Left upper lobe previously described patchy opacity is unchanged since September 23, 2019. Interval resolution of a contiguous left upper lobe area of linear atelectasis adjacent to the anterior mediastinum. 5 mm right lower lobe nodular opacity new since September 23, 2019 may represent focus of impaction of a previously seen cyst. \par \par -MRI Brain 1/14/20: No mass or abnormal intracranial enhancement to suggest metastatic disease. \par \par -CT Chest 2/14/20:  Stable patchy opacity in the left upper lobe when compared to previous exam.  No significant change in the small ill-defined opacity in the superior segment of the right lower lobe when compared to previous exam. \par \par -CT Chest 5/29/20:  Stable exam. No new or enlarging lung nodule. \par \par -CT Chest 8/22/20:  8 mm right upper lobe nodule new since May 29, 2020 possibly due to opacification of a previously seen cyst with fluid. 3 month follow-up noncontrast chest CT is recommended for further evaluation given history of lung cancer. The other bilateral pulmonary nodules and the previously seen left upper lobe patchy opacity are unchanged. \par \par -CT Chest 11/13/20:  Patchy opacity in the left upper lobe is unchanged when compared to previous exams.  Stable 0.8 cm solid nodule in the right upper lobe when compared to previous exam.\par \par -CT Chest 2/8/21:  Previously described 7 mm right upper lobe pulmonary nodule demonstrate minimal if any interval decrease in size since November 13, 2020. Continued follow-up is recommended.  4 mm right upper lobe pulmonary nodule new since November 13, 2020. A 3 month follow-up noncontrast chest CT can be performed to ensure stability or resolution.  Previously described left upper lobe patchy opacity is unchanged.\par \par -CT Chest 6/11/21: Irregular bandlike opacity within left upper lobe representing posttreatment changes is stable. Previously described 4 mm right upper lobe nodule (series 2 image 44) is stable. Another 7 mm right upper lobe nodule is resolved. No new or enlarging nodule.\par \par -CT Chest 10/25/21:  No significant interval change. Stable irregular bandlike opacity within left upper lobe likely posttreatment changes. No new or enlarging nodule.  Emphysema-cystic lung disease.\par \par –CT C/A/P 3/17/22:  Stable posttreatment changes within left upper lobe. No new or enlarging nodule.  No evidence of metastatic disease within abdomen or pelvis.\par \par Images Reviewed/Interpreted:\par \par -CT Chest 9/2/22:  Ill-defined patchy opacity in the left upper lobe is unchanged when compared to previous exam.  Numerous irregular shaped cysts are noted within both lungs. They are unchanged when compared to previous exam.\par \par

## 2022-09-08 NOTE — ASSESSMENT
[FreeTextEntry1] : Originally stage III NSCLC with adenocarcinoma histology s/p sequential chemo/RT completed June 2013.  Received SBRT to an enlarging MAICOL nodule in March 2016.  Surveillance CT scan in March 2019 with enlarging MAICOL nodule which was hypermetabolic on PET/CT with concern for LN involvement.  Underwent Bronch with EBUS biopsy on 4/25/19 with a Level 7 LN biopsy confirming adenocarcinoma consistent with lung primary.  Tumor tested PDL1 negative, tested TMB-High and molecular testing is negative for actionable mutations (TP53, among others).  Started systemic therapy with Carbo/Pem/Pembro in late May 2019.  Developed Carboplatin infusion reaction during C2 which was then discontinued.  Treated with Pem/Pembro going forward.  Achieved [place] after C3.  Treated with maintenance Pem/Pembro with sustained response.  Altered to q6 week administration due to tolerability issues in July 2020 at patient’s insistence.  \par Restaging CT Chest Sept 2022 with overall sustained response and stable findings from prior.  \par Recommend: \par -Continue with combination Pem/Pembro as maintenance for as long as it benefits the patient administered q6 weeks due to tolerability issues.  Receives the q6 week dosing of Pembro (400mg) and usual Pemetrexed 500mg/m2 dosing at this interval \par -Repeat labs today\par -Continue Vit B12 q2 cycles and daily folic acid with Dex in the kathy-chemo setting.  \par -Insomnia: zolpidem ER or Clonazepam PRN (latter prescribed by PCP)\par -Hypothyroidism: On Levothyroxine. Will continue to monitor TFT’s \par -Metoclopramide PRN nausea.  \par -F/U with PCP regarding depression and the new medication (?) that was apparently prescribed \par -F/U prior to next cycle or sooner should problems arise \par -Check out form given to patient with instructions for making appointments

## 2022-09-08 NOTE — PHYSICAL EXAM
[Restricted in physically strenuous activity but ambulatory and able to carry out work of a light or sedentary nature] : Status 1- Restricted in physically strenuous activity but ambulatory and able to carry out work of a light or sedentary nature, e.g., light house work, office work [Normal] : affect appropriate [de-identified] : No icterus [de-identified] : MMM O/P clear  [de-identified] : No LAD  [de-identified] : No edema  [de-identified] : No spine/CVA tenderness  [de-identified] : Ambulatory

## 2022-10-19 ENCOUNTER — RESULT REVIEW (OUTPATIENT)
Age: 62
End: 2022-10-19

## 2022-10-19 ENCOUNTER — APPOINTMENT (OUTPATIENT)
Dept: HEMATOLOGY ONCOLOGY | Facility: CLINIC | Age: 62
End: 2022-10-19

## 2022-10-19 ENCOUNTER — APPOINTMENT (OUTPATIENT)
Dept: INFUSION THERAPY | Facility: HOSPITAL | Age: 62
End: 2022-10-19

## 2022-10-19 ENCOUNTER — NON-APPOINTMENT (OUTPATIENT)
Age: 62
End: 2022-10-19

## 2022-10-19 VITALS
DIASTOLIC BLOOD PRESSURE: 83 MMHG | HEART RATE: 90 BPM | RESPIRATION RATE: 16 BRPM | OXYGEN SATURATION: 97 % | SYSTOLIC BLOOD PRESSURE: 139 MMHG | HEIGHT: 62.13 IN | WEIGHT: 163.36 LBS | BODY MASS INDEX: 29.68 KG/M2 | TEMPERATURE: 97.2 F

## 2022-10-19 LAB
ALBUMIN SERPL ELPH-MCNC: 3.9 G/DL — SIGNIFICANT CHANGE UP (ref 3.3–5)
ALP SERPL-CCNC: 77 U/L — SIGNIFICANT CHANGE UP (ref 40–120)
ALT FLD-CCNC: 18 U/L — SIGNIFICANT CHANGE UP (ref 10–45)
ANION GAP SERPL CALC-SCNC: 12 MMOL/L — SIGNIFICANT CHANGE UP (ref 5–17)
AST SERPL-CCNC: 23 U/L — SIGNIFICANT CHANGE UP (ref 10–40)
BASOPHILS # BLD AUTO: 0.1 K/UL — SIGNIFICANT CHANGE UP (ref 0–0.2)
BASOPHILS NFR BLD AUTO: 0.9 % — SIGNIFICANT CHANGE UP (ref 0–2)
BILIRUB SERPL-MCNC: 0.2 MG/DL — SIGNIFICANT CHANGE UP (ref 0.2–1.2)
BUN SERPL-MCNC: 8 MG/DL — SIGNIFICANT CHANGE UP (ref 7–23)
CALCIUM SERPL-MCNC: 9.9 MG/DL — SIGNIFICANT CHANGE UP (ref 8.4–10.5)
CHLORIDE SERPL-SCNC: 105 MMOL/L — SIGNIFICANT CHANGE UP (ref 96–108)
CO2 SERPL-SCNC: 24 MMOL/L — SIGNIFICANT CHANGE UP (ref 22–31)
CREAT SERPL-MCNC: 0.59 MG/DL — SIGNIFICANT CHANGE UP (ref 0.5–1.3)
EGFR: 103 ML/MIN/1.73M2 — SIGNIFICANT CHANGE UP
EOSINOPHIL # BLD AUTO: 0.43 K/UL — SIGNIFICANT CHANGE UP (ref 0–0.5)
EOSINOPHIL NFR BLD AUTO: 3.8 % — SIGNIFICANT CHANGE UP (ref 0–6)
GLUCOSE SERPL-MCNC: 84 MG/DL — SIGNIFICANT CHANGE UP (ref 70–99)
HCT VFR BLD CALC: 42.1 % — SIGNIFICANT CHANGE UP (ref 34.5–45)
HGB BLD-MCNC: 14.3 G/DL — SIGNIFICANT CHANGE UP (ref 11.5–15.5)
IMM GRANULOCYTES NFR BLD AUTO: 0.4 % — SIGNIFICANT CHANGE UP (ref 0–0.9)
LYMPHOCYTES # BLD AUTO: 19.3 % — SIGNIFICANT CHANGE UP (ref 13–44)
LYMPHOCYTES # BLD AUTO: 2.17 K/UL — SIGNIFICANT CHANGE UP (ref 1–3.3)
MCHC RBC-ENTMCNC: 32.1 PG — SIGNIFICANT CHANGE UP (ref 27–34)
MCHC RBC-ENTMCNC: 34 G/DL — SIGNIFICANT CHANGE UP (ref 32–36)
MCV RBC AUTO: 94.6 FL — SIGNIFICANT CHANGE UP (ref 80–100)
MONOCYTES # BLD AUTO: 0.93 K/UL — HIGH (ref 0–0.9)
MONOCYTES NFR BLD AUTO: 8.3 % — SIGNIFICANT CHANGE UP (ref 2–14)
NEUTROPHILS # BLD AUTO: 7.6 K/UL — HIGH (ref 1.8–7.4)
NEUTROPHILS NFR BLD AUTO: 67.3 % — SIGNIFICANT CHANGE UP (ref 43–77)
NRBC # BLD: 0 /100 WBCS — SIGNIFICANT CHANGE UP (ref 0–0)
PLATELET # BLD AUTO: 305 K/UL — SIGNIFICANT CHANGE UP (ref 150–400)
POTASSIUM SERPL-MCNC: 4.8 MMOL/L — SIGNIFICANT CHANGE UP (ref 3.5–5.3)
POTASSIUM SERPL-SCNC: 4.8 MMOL/L — SIGNIFICANT CHANGE UP (ref 3.5–5.3)
PROT SERPL-MCNC: 6.8 G/DL — SIGNIFICANT CHANGE UP (ref 6–8.3)
RBC # BLD: 4.45 M/UL — SIGNIFICANT CHANGE UP (ref 3.8–5.2)
RBC # FLD: 13.5 % — SIGNIFICANT CHANGE UP (ref 10.3–14.5)
SODIUM SERPL-SCNC: 141 MMOL/L — SIGNIFICANT CHANGE UP (ref 135–145)
WBC # BLD: 11.27 K/UL — HIGH (ref 3.8–10.5)
WBC # FLD AUTO: 11.27 K/UL — HIGH (ref 3.8–10.5)

## 2022-10-19 PROCEDURE — 99214 OFFICE O/P EST MOD 30 MIN: CPT

## 2022-10-20 NOTE — ASSESSMENT
[FreeTextEntry1] : Originally stage III NSCLC with adenocarcinoma histology s/p sequential chemo/RT completed June 2013.  Received SBRT to an enlarging MAICOL nodule in March 2016.  Surveillance CT scan in March 2019 with enlarging MAICOL nodule which was hypermetabolic on PET/CT with concern for LN involvement.  Underwent Bronch with EBUS biopsy on 4/25/19 with a Level 7 LN biopsy confirming adenocarcinoma consistent with lung primary.  Tumor tested PDL1 negative, tested TMB-High and molecular testing is negative for actionable mutations (TP53, among others).  Started systemic therapy with Carbo/Pem/Pembro in late May 2019.  Developed Carboplatin infusion reaction during C2 which was then discontinued.  Treated with Pem/Pembro going forward.  Achieved [place] after C3.  Treated with maintenance Pem/Pembro with sustained response.  Altered to q6 week administration due to tolerability issues in July 2020 at patient’s insistence.  \par Restaging CT Chest Sept 2022 with overall sustained response and stable findings from prior.  \par Recommend: \par -Continue with combination Pem/Pembro as maintenance for as long as it benefits the patient administered q6 weeks due to tolerability issues.  Receives the q6 week dosing of Pembro (400mg) and usual Pemetrexed 500mg/m2 dosing at this interval \par -Repeat labs today\par -Continue Vit B12 q2 cycles and daily folic acid with Dex in the kathy-chemo setting.  \par -Insomnia: zolpidem ER or Clonazepam PRN (latter prescribed by PCP)\par -Hypothyroidism: On Levothyroxine. Will continue to monitor TFT’s \par -F/U prior to next cycle or sooner should problems arise; plan on obtaining her next restaging scan in January if she remains clinically stable \par -Check out form given to patient with instructions for making appointments

## 2022-10-20 NOTE — PHYSICAL EXAM
[Restricted in physically strenuous activity but ambulatory and able to carry out work of a light or sedentary nature] : Status 1- Restricted in physically strenuous activity but ambulatory and able to carry out work of a light or sedentary nature, e.g., light house work, office work [Normal] : affect appropriate [de-identified] : No icterus [de-identified] : MMM O/P clear  [de-identified] : No LAD  [de-identified] : No edema  [de-identified] : No spine/CVA tenderness  [de-identified] : Ambulatory

## 2022-10-20 NOTE — HISTORY OF PRESENT ILLNESS
[Disease: _____________________] : Disease: [unfilled] [de-identified] : 55F who was diagnosed with lung cancer in 2012.  She had been having recurrent URI's with wheezing.  CXR showed a nodule and she therefore had a CT scan and PET/CT scan revealing a 1.3cm left apical soft tissue tumor with hypermetabolism in left hilum, left AP window and right azygoesophageal recess.  Patient underwent EBUS with biopys of left paratracheal LN with pathology consistent with adenocarcinoma of lung primary.  She was considered to have Stage IIIA/B disease (possibly N3 disease based on right sided findings).  She was treated with sequential chemo/RT with Cisplatin and Pemetrexed chemo begun in January 2013 and RT completed in June 2013.  She was followed with observation.  Underwent SBRT to MAICOL nodule in March 2016.  Surveillance CT chest on 3/11/19 revealed an enlarging MAICOL nodule.  She was referred back to Rad-Onc.  PET/CT was obtained in late March 2019 revealing this nodule to be hypermetabolic with concern for a lower hilar LN involvement.  She is now s/p EBUS on 4/25/19 with biopsy of a Level 7 LN that is positive for metastatic adenocarcinoma consistent with lung primary.  Tumor tested PDL1 negative and contains no actionable mutations.  Started systemic therapy with Carbo/Pem/Pembro in late May 2019.  Developed Carboplatin hypersensitivity reaction during C2 and Carbo was discontinued.  Treated with Pemetrexed + Pembrolizumab going forward.  Achieved [place].  Altered to q6 week administration at patient’s insistence due to tolerability issues in July 2020.   [de-identified] : Adenocarcinoma [de-identified] : Patient presents prior to continued treatment with Pem/Pembro; achieved [place]. \par She reports having a URI 2 weeks ago.  She complains of a sore throat since this morning.  Fatigue is overall improved.  Appetite and weight stable.  Continues to work full-time.\par She reports that she is going to [place] with her daughter after Thanksgiving and needs to change her next scheduled treatment for a week later in early December.

## 2022-10-20 NOTE — RESULTS/DATA
[FreeTextEntry1] : -CT Chest 3/11/19:  0.8 cm lobulated nodule in the apical segment of the left upper lobe has slightly increased in size when compared to previous exam. \par \par -PET/CT 3/25/19:  Abnormal FDG-PET/CT scan. \par 1. Interval resolution of previously seen hypermetabolic left upper lung nodule with associated minimally FDG avid postradiation changes as compared to PET/CT from 10/20/2015, however with interval appearance of new hypermetabolic left upper lung nodule which is superiorly located. This is suspicious for malignancy, metastatic versus new lesion. \par 2. Interval increase in FDG avidity of previously seen left perihilar focus, with additional adjacent new foci. These are concerning for metastases. \par 3. Nonspecific diffuse esophageal hypermetabolism, not significantly changed, which may again reflect esophagitis. Please correlate clinically. \par 4. New mild diffuse hypermetabolism in the anterior oral cavity, difficult to delineate due to streak artifacts, probably physiologic salivary activity. Please correlate clinically. \par \par -Level 7 Lymph Node EBUS guided FNA 4/25/19:  Positive for malignant cells.  Metastatic Adenocarcinoma consistent with lung primary.  \par \par -CT Chest 5/9/19:  Left upper lobe 8 mm nodule may be slightly larger than the prior study. Malignancy should be considered. Multiple other bilateral lung nodules measuring up to 5 mm are unchanged.. Patchy left upper lobe consolidation is similar compared to several prior studies. \par \par -MRI Brain 5/13/19:  No evidence of brain metastases. Minimal small vessel white matter ischemic changes. No change since 12/19/2012. \par \par -CT Chest 7/15/19:  \par 1. The left upper lobe nodule has decreased in size and measures 5 mm. \par 2. The additional left upper lobe nodule and right upper lobe nodules are unchanged. \par 3. The left upper lobe opacities are unchanged since 5/9/2019. \par \par -CT Chest 9/23/19: \par 1. Since 7/15/2019, the patchy left upper lobe opacities are unchanged. \par 2. Since 7/15/2019, 5 mm left upper lobe nodule is unchanged. \par 3. Since 9/10/2018, the additional bilateral upper lobe nodules are unchanged. \par 4. Emphysema \par \par -CT Chest 11/29/19:  Left upper lobe previously described patchy opacity is unchanged since September 23, 2019. Interval resolution of a contiguous left upper lobe area of linear atelectasis adjacent to the anterior mediastinum. 5 mm right lower lobe nodular opacity new since September 23, 2019 may represent focus of impaction of a previously seen cyst. \par \par -MRI Brain 1/14/20: No mass or abnormal intracranial enhancement to suggest metastatic disease. \par \par -CT Chest 2/14/20:  Stable patchy opacity in the left upper lobe when compared to previous exam.  No significant change in the small ill-defined opacity in the superior segment of the right lower lobe when compared to previous exam. \par \par -CT Chest 5/29/20:  Stable exam. No new or enlarging lung nodule. \par \par -CT Chest 8/22/20:  8 mm right upper lobe nodule new since May 29, 2020 possibly due to opacification of a previously seen cyst with fluid. 3 month follow-up noncontrast chest CT is recommended for further evaluation given history of lung cancer. The other bilateral pulmonary nodules and the previously seen left upper lobe patchy opacity are unchanged. \par \par -CT Chest 11/13/20:  Patchy opacity in the left upper lobe is unchanged when compared to previous exams.  Stable 0.8 cm solid nodule in the right upper lobe when compared to previous exam.\par \par -CT Chest 2/8/21:  Previously described 7 mm right upper lobe pulmonary nodule demonstrate minimal if any interval decrease in size since November 13, 2020. Continued follow-up is recommended.  4 mm right upper lobe pulmonary nodule new since November 13, 2020. A 3 month follow-up noncontrast chest CT can be performed to ensure stability or resolution.  Previously described left upper lobe patchy opacity is unchanged.\par \par -CT Chest 6/11/21: Irregular bandlike opacity within left upper lobe representing posttreatment changes is stable. Previously described 4 mm right upper lobe nodule (series 2 image 44) is stable. Another 7 mm right upper lobe nodule is resolved. No new or enlarging nodule.\par \par -CT Chest 10/25/21:  No significant interval change. Stable irregular bandlike opacity within left upper lobe likely posttreatment changes. No new or enlarging nodule.  Emphysema-cystic lung disease.\par \par –CT C/A/P 3/17/22:  Stable posttreatment changes within left upper lobe. No new or enlarging nodule.  No evidence of metastatic disease within abdomen or pelvis.\par \par -CT Chest 9/2/22:  Ill-defined patchy opacity in the left upper lobe is unchanged when compared to previous exam.  Numerous irregular shaped cysts are noted within both lungs. They are unchanged when compared to previous exam.\par \par

## 2022-10-31 ENCOUNTER — NON-APPOINTMENT (OUTPATIENT)
Age: 62
End: 2022-10-31

## 2022-11-01 ENCOUNTER — NON-APPOINTMENT (OUTPATIENT)
Age: 62
End: 2022-11-01

## 2022-11-18 ENCOUNTER — NON-APPOINTMENT (OUTPATIENT)
Age: 62
End: 2022-11-18

## 2022-11-30 ENCOUNTER — APPOINTMENT (OUTPATIENT)
Dept: HEMATOLOGY ONCOLOGY | Facility: CLINIC | Age: 62
End: 2022-11-30

## 2022-11-30 ENCOUNTER — APPOINTMENT (OUTPATIENT)
Dept: INFUSION THERAPY | Facility: HOSPITAL | Age: 62
End: 2022-11-30

## 2022-12-05 ENCOUNTER — OUTPATIENT (OUTPATIENT)
Dept: OUTPATIENT SERVICES | Facility: HOSPITAL | Age: 62
LOS: 1 days | Discharge: HOME | End: 2022-12-05

## 2022-12-05 DIAGNOSIS — C34.90 MALIGNANT NEOPLASM OF UNSPECIFIED PART OF UNSPECIFIED BRONCHUS OR LUNG: ICD-10-CM

## 2022-12-05 DIAGNOSIS — Z98.890 OTHER SPECIFIED POSTPROCEDURAL STATES: Chronic | ICD-10-CM

## 2022-12-05 DIAGNOSIS — Z85.118 PERSONAL HISTORY OF OTHER MALIGNANT NEOPLASM OF BRONCHUS AND LUNG: Chronic | ICD-10-CM

## 2022-12-05 DIAGNOSIS — O03.9 COMPLETE OR UNSPECIFIED SPONTANEOUS ABORTION WITHOUT COMPLICATION: Chronic | ICD-10-CM

## 2022-12-07 ENCOUNTER — APPOINTMENT (OUTPATIENT)
Dept: HEMATOLOGY ONCOLOGY | Facility: CLINIC | Age: 62
End: 2022-12-07

## 2022-12-07 ENCOUNTER — APPOINTMENT (OUTPATIENT)
Dept: INFUSION THERAPY | Facility: HOSPITAL | Age: 62
End: 2022-12-07

## 2023-01-18 ENCOUNTER — APPOINTMENT (OUTPATIENT)
Dept: HEMATOLOGY ONCOLOGY | Facility: CLINIC | Age: 63
End: 2023-01-18
Payer: COMMERCIAL

## 2023-01-18 ENCOUNTER — RESULT REVIEW (OUTPATIENT)
Age: 63
End: 2023-01-18

## 2023-01-18 ENCOUNTER — APPOINTMENT (OUTPATIENT)
Dept: INFUSION THERAPY | Facility: HOSPITAL | Age: 63
End: 2023-01-18

## 2023-01-18 VITALS
BODY MASS INDEX: 30.24 KG/M2 | TEMPERATURE: 97.4 F | HEIGHT: 62.13 IN | SYSTOLIC BLOOD PRESSURE: 145 MMHG | HEART RATE: 87 BPM | DIASTOLIC BLOOD PRESSURE: 83 MMHG | WEIGHT: 166.45 LBS | RESPIRATION RATE: 16 BRPM | OXYGEN SATURATION: 93 %

## 2023-01-18 DIAGNOSIS — Z51.11 ENCOUNTER FOR ANTINEOPLASTIC CHEMOTHERAPY: ICD-10-CM

## 2023-01-18 DIAGNOSIS — R11.2 NAUSEA WITH VOMITING, UNSPECIFIED: ICD-10-CM

## 2023-01-18 LAB
ALBUMIN SERPL ELPH-MCNC: 3.9 G/DL — SIGNIFICANT CHANGE UP (ref 3.3–5)
ALP SERPL-CCNC: 77 U/L — SIGNIFICANT CHANGE UP (ref 40–120)
ALT FLD-CCNC: 15 U/L — SIGNIFICANT CHANGE UP (ref 10–45)
ANION GAP SERPL CALC-SCNC: 12 MMOL/L — SIGNIFICANT CHANGE UP (ref 5–17)
AST SERPL-CCNC: 17 U/L — SIGNIFICANT CHANGE UP (ref 10–40)
BASOPHILS # BLD AUTO: 0.11 K/UL — SIGNIFICANT CHANGE UP (ref 0–0.2)
BASOPHILS NFR BLD AUTO: 0.9 % — SIGNIFICANT CHANGE UP (ref 0–2)
BILIRUB SERPL-MCNC: 0.2 MG/DL — SIGNIFICANT CHANGE UP (ref 0.2–1.2)
BUN SERPL-MCNC: 6 MG/DL — LOW (ref 7–23)
CALCIUM SERPL-MCNC: 9.7 MG/DL — SIGNIFICANT CHANGE UP (ref 8.4–10.5)
CHLORIDE SERPL-SCNC: 106 MMOL/L — SIGNIFICANT CHANGE UP (ref 96–108)
CO2 SERPL-SCNC: 22 MMOL/L — SIGNIFICANT CHANGE UP (ref 22–31)
CREAT SERPL-MCNC: 0.62 MG/DL — SIGNIFICANT CHANGE UP (ref 0.5–1.3)
EGFR: 102 ML/MIN/1.73M2 — SIGNIFICANT CHANGE UP
EOSINOPHIL # BLD AUTO: 0.83 K/UL — HIGH (ref 0–0.5)
EOSINOPHIL NFR BLD AUTO: 7.1 % — HIGH (ref 0–6)
GLUCOSE SERPL-MCNC: 91 MG/DL — SIGNIFICANT CHANGE UP (ref 70–99)
HCT VFR BLD CALC: 42.4 % — SIGNIFICANT CHANGE UP (ref 34.5–45)
HGB BLD-MCNC: 14.3 G/DL — SIGNIFICANT CHANGE UP (ref 11.5–15.5)
IMM GRANULOCYTES NFR BLD AUTO: 0.3 % — SIGNIFICANT CHANGE UP (ref 0–0.9)
LYMPHOCYTES # BLD AUTO: 2.76 K/UL — SIGNIFICANT CHANGE UP (ref 1–3.3)
LYMPHOCYTES # BLD AUTO: 23.6 % — SIGNIFICANT CHANGE UP (ref 13–44)
MCHC RBC-ENTMCNC: 31.3 PG — SIGNIFICANT CHANGE UP (ref 27–34)
MCHC RBC-ENTMCNC: 33.7 G/DL — SIGNIFICANT CHANGE UP (ref 32–36)
MCV RBC AUTO: 92.8 FL — SIGNIFICANT CHANGE UP (ref 80–100)
MONOCYTES # BLD AUTO: 0.94 K/UL — HIGH (ref 0–0.9)
MONOCYTES NFR BLD AUTO: 8 % — SIGNIFICANT CHANGE UP (ref 2–14)
NEUTROPHILS # BLD AUTO: 7.04 K/UL — SIGNIFICANT CHANGE UP (ref 1.8–7.4)
NEUTROPHILS NFR BLD AUTO: 60.1 % — SIGNIFICANT CHANGE UP (ref 43–77)
NRBC # BLD: 0 /100 WBCS — SIGNIFICANT CHANGE UP (ref 0–0)
PLATELET # BLD AUTO: 318 K/UL — SIGNIFICANT CHANGE UP (ref 150–400)
POTASSIUM SERPL-MCNC: 4.3 MMOL/L — SIGNIFICANT CHANGE UP (ref 3.5–5.3)
POTASSIUM SERPL-SCNC: 4.3 MMOL/L — SIGNIFICANT CHANGE UP (ref 3.5–5.3)
PROT SERPL-MCNC: 6.8 G/DL — SIGNIFICANT CHANGE UP (ref 6–8.3)
RBC # BLD: 4.57 M/UL — SIGNIFICANT CHANGE UP (ref 3.8–5.2)
RBC # FLD: 12.1 % — SIGNIFICANT CHANGE UP (ref 10.3–14.5)
SODIUM SERPL-SCNC: 139 MMOL/L — SIGNIFICANT CHANGE UP (ref 135–145)
T4 FREE SERPL-MCNC: 1.4 NG/DL — SIGNIFICANT CHANGE UP (ref 0.9–1.8)
TSH SERPL-MCNC: 5.63 UIU/ML — HIGH (ref 0.27–4.2)
WBC # BLD: 11.71 K/UL — HIGH (ref 3.8–10.5)
WBC # FLD AUTO: 11.71 K/UL — HIGH (ref 3.8–10.5)

## 2023-01-18 PROCEDURE — 99214 OFFICE O/P EST MOD 30 MIN: CPT

## 2023-01-19 NOTE — HISTORY OF PRESENT ILLNESS
[Disease: _____________________] : Disease: [unfilled] [de-identified] : 55F who was diagnosed with lung cancer in 2012.  She had been having recurrent URI's with wheezing.  CXR showed a nodule and she therefore had a CT scan and PET/CT scan revealing a 1.3cm left apical soft tissue tumor with hypermetabolism in left hilum, left AP window and right azygoesophageal recess.  Patient underwent EBUS with biopys of left paratracheal LN with pathology consistent with adenocarcinoma of lung primary.  She was considered to have Stage IIIA/B disease (possibly N3 disease based on right sided findings).  She was treated with sequential chemo/RT with Cisplatin and Pemetrexed chemo begun in January 2013 and RT completed in June 2013.  She was followed with observation.  Underwent SBRT to MAICOL nodule in March 2016.  Surveillance CT chest on 3/11/19 revealed an enlarging MAICOL nodule.  She was referred back to Rad-Onc.  PET/CT was obtained in late March 2019 revealing this nodule to be hypermetabolic with concern for a lower hilar LN involvement.  She is now s/p EBUS on 4/25/19 with biopsy of a Level 7 LN that is positive for metastatic adenocarcinoma consistent with lung primary.  Tumor tested PDL1 negative and contains no actionable mutations.  Started systemic therapy with Carbo/Pem/Pembro in late May 2019.  Developed Carboplatin hypersensitivity reaction during C2 and Carbo was discontinued.  Treated with Pemetrexed + Pembrolizumab going forward.  Achieved SD.  Altered to q6 week administration at patient’s insistence due to tolerability issues in July 2020.   [de-identified] : Adenocarcinoma [de-identified] : Patient presents prior to continued treatment with Pem/Pembro; achieved [place]. \par Her most recent treatment was in October 2022.  She canceled her treatment in December 2022 secondary to COVID-19 infection.  She did not reschedule for any earlier appointment of her own volition.  She reports an occasional wheeze.  She reports that she developed some proteinuria and was referred to nephrology; she was unable to tell them what treatment she was getting and that information was provided to her again today.  She has fatigue.  Appetite and weight stable.  Continues to work full-time.

## 2023-01-19 NOTE — ASSESSMENT
[FreeTextEntry1] : Originally stage III NSCLC with adenocarcinoma histology s/p sequential chemo/RT completed June 2013.  Received SBRT to an enlarging MAICOL nodule in March 2016.  Surveillance CT scan in March 2019 with enlarging MAICOL nodule which was hypermetabolic on PET/CT with concern for LN involvement.  Underwent Bronch with EBUS biopsy on 4/25/19 with a Level 7 LN biopsy confirming adenocarcinoma consistent with lung primary.  Tumor tested PDL1 negative, tested TMB-High and molecular testing is negative for actionable mutations (TP53, among others).  Started systemic therapy with Carbo/Pem/Pembro in late May 2019.  Developed Carboplatin infusion reaction during C2 which was then discontinued.  Treated with Pem/Pembro going forward.  Achieved [place] after C3.  Treated with maintenance Pem/Pembro with sustained response.  Altered to q6 week administration due to tolerability issues in July 2020 at patient’s insistence.  \par Restaging CT Chest Sept 2022 with overall sustained response and stable findings from prior.  \par Recommend: \par -Resume treatment with combination Pem/Pembro as maintenance for as long as it benefits the patient administered q6 weeks due to tolerability issues.  Receives the q6 week dosing of Pembro (400mg) and usual Pemetrexed 500mg/m2 dosing at this interval.  \par -Repeat labs today\par -She reports that she is seeing Nephrology for proteinuria \par -Continue Vit B12 q2 cycles and daily folic acid with Dex in the kathy-chemo setting.  \par -Insomnia: zolpidem ER or Clonazepam PRN (latter prescribed by PCP)\par -Hypothyroidism: On Levothyroxine. Will continue to monitor TFT’s \par -Originally, it was plans for the patient to have a restaging scan prior to this visit, however, since she canceled the early December planned visit and treatment, this was not done.  Recommend obtaining a restaging scan prior to her next cycle of treatment; as I will not be in the office for that timing, recommend she do a telehealth follow-up visit in late February to review the results of the restaging scan prior to her next planned treatment due on 3/1.  Restaging scan therefore to be obtained in the latter part of February.\par -Check out form given to patient with instructions for making appointments

## 2023-01-19 NOTE — PHYSICAL EXAM
[Restricted in physically strenuous activity but ambulatory and able to carry out work of a light or sedentary nature] : Status 1- Restricted in physically strenuous activity but ambulatory and able to carry out work of a light or sedentary nature, e.g., light house work, office work [Normal] : affect appropriate [de-identified] : No icterus [de-identified] : MMM O/P clear  [de-identified] : No LAD  [de-identified] : No edema  [de-identified] : No spine/CVA tenderness  [de-identified] : Ambulatory

## 2023-01-20 ENCOUNTER — RESULT REVIEW (OUTPATIENT)
Age: 63
End: 2023-01-20

## 2023-02-15 ENCOUNTER — OUTPATIENT (OUTPATIENT)
Dept: OUTPATIENT SERVICES | Facility: HOSPITAL | Age: 63
LOS: 1 days | Discharge: HOME | End: 2023-02-15

## 2023-02-15 DIAGNOSIS — O03.9 COMPLETE OR UNSPECIFIED SPONTANEOUS ABORTION WITHOUT COMPLICATION: Chronic | ICD-10-CM

## 2023-02-15 DIAGNOSIS — C34.90 MALIGNANT NEOPLASM OF UNSPECIFIED PART OF UNSPECIFIED BRONCHUS OR LUNG: ICD-10-CM

## 2023-02-15 DIAGNOSIS — Z98.890 OTHER SPECIFIED POSTPROCEDURAL STATES: Chronic | ICD-10-CM

## 2023-02-15 DIAGNOSIS — Z85.118 PERSONAL HISTORY OF OTHER MALIGNANT NEOPLASM OF BRONCHUS AND LUNG: Chronic | ICD-10-CM

## 2023-02-17 ENCOUNTER — APPOINTMENT (OUTPATIENT)
Dept: CT IMAGING | Facility: IMAGING CENTER | Age: 63
End: 2023-02-17
Payer: COMMERCIAL

## 2023-02-17 ENCOUNTER — OUTPATIENT (OUTPATIENT)
Dept: OUTPATIENT SERVICES | Facility: HOSPITAL | Age: 63
LOS: 1 days | End: 2023-02-17
Payer: COMMERCIAL

## 2023-02-17 DIAGNOSIS — O03.9 COMPLETE OR UNSPECIFIED SPONTANEOUS ABORTION WITHOUT COMPLICATION: Chronic | ICD-10-CM

## 2023-02-17 DIAGNOSIS — Z98.890 OTHER SPECIFIED POSTPROCEDURAL STATES: Chronic | ICD-10-CM

## 2023-02-17 DIAGNOSIS — C34.12 MALIGNANT NEOPLASM OF UPPER LOBE, LEFT BRONCHUS OR LUNG: ICD-10-CM

## 2023-02-17 DIAGNOSIS — Z85.118 PERSONAL HISTORY OF OTHER MALIGNANT NEOPLASM OF BRONCHUS AND LUNG: Chronic | ICD-10-CM

## 2023-02-17 PROCEDURE — 71260 CT THORAX DX C+: CPT | Mod: 26

## 2023-02-17 PROCEDURE — 71260 CT THORAX DX C+: CPT

## 2023-02-17 PROCEDURE — 74177 CT ABD & PELVIS W/CONTRAST: CPT | Mod: 26

## 2023-02-17 PROCEDURE — 74177 CT ABD & PELVIS W/CONTRAST: CPT

## 2023-02-22 ENCOUNTER — APPOINTMENT (OUTPATIENT)
Dept: HEMATOLOGY ONCOLOGY | Facility: CLINIC | Age: 63
End: 2023-02-22
Payer: COMMERCIAL

## 2023-02-22 PROCEDURE — 99214 OFFICE O/P EST MOD 30 MIN: CPT | Mod: 95

## 2023-02-23 NOTE — HISTORY OF PRESENT ILLNESS
[Home] : at home, [unfilled] , at the time of the visit. [Medical Office: ([facility])___] : at the medical office located in  [Verbal consent obtained from patient] : the patient, [unfilled] [Disease: _____________________] : Disease: [unfilled] [FreeTextEntry4] : Rich Godinez [de-identified] : 55F who was diagnosed with lung cancer in 2012.  She had been having recurrent URI's with wheezing.  CXR showed a nodule and she therefore had a CT scan and PET/CT scan revealing a 1.3cm left apical soft tissue tumor with hypermetabolism in left hilum, left AP window and right azygoesophageal recess.  Patient underwent EBUS with biopys of left paratracheal LN with pathology consistent with adenocarcinoma of lung primary.  She was considered to have Stage IIIA/B disease (possibly N3 disease based on right sided findings).  She was treated with sequential chemo/RT with Cisplatin and Pemetrexed chemo begun in January 2013 and RT completed in June 2013.  She was followed with observation.  Underwent SBRT to MAICOL nodule in March 2016.  Surveillance CT chest on 3/11/19 revealed an enlarging MAICOL nodule.  She was referred back to Rad-Onc.  PET/CT was obtained in late March 2019 revealing this nodule to be hypermetabolic with concern for a lower hilar LN involvement.  She is now s/p EBUS on 4/25/19 with biopsy of a Level 7 LN that is positive for metastatic adenocarcinoma consistent with lung primary.  Tumor tested PDL1 negative and contains no actionable mutations.  Started systemic therapy with Carbo/Pem/Pembro in late May 2019.  Developed Carboplatin hypersensitivity reaction during C2 and Carbo was discontinued.  Treated with Pemetrexed + Pembrolizumab going forward.  Achieved [place].  Altered to q6 week administration at patient’s insistence due to tolerability issues in July 2020.   [de-identified] : Adenocarcinoma [de-identified] : Patient continues on treatment with Pem/Pembro; achieved [place]. \par Patient seen via telehealth visit today in order to review results of her restaging scan prior to continued treatment which is scheduled for next week, when I am not in the office.\par She reports feeling fairly well.  She reports her most recent cycle administered in January had less side effects.\par She has fatigue however she continues to work full-time.  Appetite and weight stable.\par She reports seeing nephrology once for proteinuria but did not follow-up as she did not like the area where the office was located; she plans to follow-up with her PCP.\par \par Restaging body CT this month with sustained response.

## 2023-02-23 NOTE — PHYSICAL EXAM
[Restricted in physically strenuous activity but ambulatory and able to carry out work of a light or sedentary nature] : Status 1- Restricted in physically strenuous activity but ambulatory and able to carry out work of a light or sedentary nature, e.g., light house work, office work [Normal] : affect appropriate [de-identified] : No icterus [de-identified] : TAVIA  [de-identified] : No LAD  [de-identified] : Not SOB [de-identified] : No edema  [de-identified] : Ambulatory

## 2023-02-23 NOTE — RESULTS/DATA
[FreeTextEntry1] : -CT Chest 3/11/19:  0.8 cm lobulated nodule in the apical segment of the left upper lobe has slightly increased in size when compared to previous exam. \par \par -PET/CT 3/25/19:  Abnormal FDG-PET/CT scan. \par 1. Interval resolution of previously seen hypermetabolic left upper lung nodule with associated minimally FDG avid postradiation changes as compared to PET/CT from 10/20/2015, however with interval appearance of new hypermetabolic left upper lung nodule which is superiorly located. This is suspicious for malignancy, metastatic versus new lesion. \par 2. Interval increase in FDG avidity of previously seen left perihilar focus, with additional adjacent new foci. These are concerning for metastases. \par 3. Nonspecific diffuse esophageal hypermetabolism, not significantly changed, which may again reflect esophagitis. Please correlate clinically. \par 4. New mild diffuse hypermetabolism in the anterior oral cavity, difficult to delineate due to streak artifacts, probably physiologic salivary activity. Please correlate clinically. \par \par -Level 7 Lymph Node EBUS guided FNA 4/25/19:  Positive for malignant cells.  Metastatic Adenocarcinoma consistent with lung primary.  \par \par -CT Chest 5/9/19:  Left upper lobe 8 mm nodule may be slightly larger than the prior study. Malignancy should be considered. Multiple other bilateral lung nodules measuring up to 5 mm are unchanged.. Patchy left upper lobe consolidation is similar compared to several prior studies. \par \par -MRI Brain 5/13/19:  No evidence of brain metastases. Minimal small vessel white matter ischemic changes. No change since 12/19/2012. \par \par -CT Chest 7/15/19:  \par 1. The left upper lobe nodule has decreased in size and measures 5 mm. \par 2. The additional left upper lobe nodule and right upper lobe nodules are unchanged. \par 3. The left upper lobe opacities are unchanged since 5/9/2019. \par \par -CT Chest 9/23/19: \par 1. Since 7/15/2019, the patchy left upper lobe opacities are unchanged. \par 2. Since 7/15/2019, 5 mm left upper lobe nodule is unchanged. \par 3. Since 9/10/2018, the additional bilateral upper lobe nodules are unchanged. \par 4. Emphysema \par \par -CT Chest 11/29/19:  Left upper lobe previously described patchy opacity is unchanged since September 23, 2019. Interval resolution of a contiguous left upper lobe area of linear atelectasis adjacent to the anterior mediastinum. 5 mm right lower lobe nodular opacity new since September 23, 2019 may represent focus of impaction of a previously seen cyst. \par \par -MRI Brain 1/14/20: No mass or abnormal intracranial enhancement to suggest metastatic disease. \par \par -CT Chest 2/14/20:  Stable patchy opacity in the left upper lobe when compared to previous exam.  No significant change in the small ill-defined opacity in the superior segment of the right lower lobe when compared to previous exam. \par \par -CT Chest 5/29/20:  Stable exam. No new or enlarging lung nodule. \par \par -CT Chest 8/22/20:  8 mm right upper lobe nodule new since May 29, 2020 possibly due to opacification of a previously seen cyst with fluid. 3 month follow-up noncontrast chest CT is recommended for further evaluation given history of lung cancer. The other bilateral pulmonary nodules and the previously seen left upper lobe patchy opacity are unchanged. \par \par -CT Chest 11/13/20:  Patchy opacity in the left upper lobe is unchanged when compared to previous exams.  Stable 0.8 cm solid nodule in the right upper lobe when compared to previous exam.\par \par -CT Chest 2/8/21:  Previously described 7 mm right upper lobe pulmonary nodule demonstrate minimal if any interval decrease in size since November 13, 2020. Continued follow-up is recommended.  4 mm right upper lobe pulmonary nodule new since November 13, 2020. A 3 month follow-up noncontrast chest CT can be performed to ensure stability or resolution.  Previously described left upper lobe patchy opacity is unchanged.\par \par -CT Chest 6/11/21: Irregular bandlike opacity within left upper lobe representing posttreatment changes is stable. Previously described 4 mm right upper lobe nodule (series 2 image 44) is stable. Another 7 mm right upper lobe nodule is resolved. No new or enlarging nodule.\par \par -CT Chest 10/25/21:  No significant interval change. Stable irregular bandlike opacity within left upper lobe likely posttreatment changes. No new or enlarging nodule.  Emphysema-cystic lung disease.\par \par –CT C/A/P 3/17/22:  Stable posttreatment changes within left upper lobe. No new or enlarging nodule.  No evidence of metastatic disease within abdomen or pelvis.\par \par -CT Chest 9/2/22:  Ill-defined patchy opacity in the left upper lobe is unchanged when compared to previous exam.  Numerous irregular shaped cysts are noted within both lungs. They are unchanged when compared to previous exam.\par \par Images Reviewed/Interpreted:\par \par -CT C/A/P 2/17/23:  Stable ill-defined patchy and nodular opacities in the left upper lobe, may be posttreatment related. No new disease in the chest.  No metastatic disease in the abdomen or pelvis.\par \par

## 2023-03-01 ENCOUNTER — RESULT REVIEW (OUTPATIENT)
Age: 63
End: 2023-03-01

## 2023-03-01 ENCOUNTER — NON-APPOINTMENT (OUTPATIENT)
Age: 63
End: 2023-03-01

## 2023-03-01 ENCOUNTER — APPOINTMENT (OUTPATIENT)
Dept: INFUSION THERAPY | Facility: HOSPITAL | Age: 63
End: 2023-03-01

## 2023-03-01 DIAGNOSIS — R11.2 NAUSEA WITH VOMITING, UNSPECIFIED: ICD-10-CM

## 2023-03-01 DIAGNOSIS — Z51.11 ENCOUNTER FOR ANTINEOPLASTIC CHEMOTHERAPY: ICD-10-CM

## 2023-03-01 LAB
ALBUMIN SERPL ELPH-MCNC: 3.9 G/DL — SIGNIFICANT CHANGE UP (ref 3.3–5)
ALP SERPL-CCNC: 73 U/L — SIGNIFICANT CHANGE UP (ref 40–120)
ALT FLD-CCNC: 13 U/L — SIGNIFICANT CHANGE UP (ref 10–45)
ANION GAP SERPL CALC-SCNC: 11 MMOL/L — SIGNIFICANT CHANGE UP (ref 5–17)
AST SERPL-CCNC: 17 U/L — SIGNIFICANT CHANGE UP (ref 10–40)
BASOPHILS # BLD AUTO: 0.13 K/UL — SIGNIFICANT CHANGE UP (ref 0–0.2)
BASOPHILS NFR BLD AUTO: 1.2 % — SIGNIFICANT CHANGE UP (ref 0–2)
BILIRUB SERPL-MCNC: 0.2 MG/DL — SIGNIFICANT CHANGE UP (ref 0.2–1.2)
BUN SERPL-MCNC: 7 MG/DL — SIGNIFICANT CHANGE UP (ref 7–23)
CALCIUM SERPL-MCNC: 9.8 MG/DL — SIGNIFICANT CHANGE UP (ref 8.4–10.5)
CHLORIDE SERPL-SCNC: 104 MMOL/L — SIGNIFICANT CHANGE UP (ref 96–108)
CO2 SERPL-SCNC: 26 MMOL/L — SIGNIFICANT CHANGE UP (ref 22–31)
CREAT SERPL-MCNC: 0.61 MG/DL — SIGNIFICANT CHANGE UP (ref 0.5–1.3)
EGFR: 102 ML/MIN/1.73M2 — SIGNIFICANT CHANGE UP
EOSINOPHIL # BLD AUTO: 0.83 K/UL — HIGH (ref 0–0.5)
EOSINOPHIL NFR BLD AUTO: 7.6 % — HIGH (ref 0–6)
GLUCOSE SERPL-MCNC: 88 MG/DL — SIGNIFICANT CHANGE UP (ref 70–99)
HCT VFR BLD CALC: 42.5 % — SIGNIFICANT CHANGE UP (ref 34.5–45)
HGB BLD-MCNC: 14.6 G/DL — SIGNIFICANT CHANGE UP (ref 11.5–15.5)
IMM GRANULOCYTES NFR BLD AUTO: 0.3 % — SIGNIFICANT CHANGE UP (ref 0–0.9)
LYMPHOCYTES # BLD AUTO: 2.43 K/UL — SIGNIFICANT CHANGE UP (ref 1–3.3)
LYMPHOCYTES # BLD AUTO: 22.4 % — SIGNIFICANT CHANGE UP (ref 13–44)
MCHC RBC-ENTMCNC: 31.6 PG — SIGNIFICANT CHANGE UP (ref 27–34)
MCHC RBC-ENTMCNC: 34.4 G/DL — SIGNIFICANT CHANGE UP (ref 32–36)
MCV RBC AUTO: 92 FL — SIGNIFICANT CHANGE UP (ref 80–100)
MONOCYTES # BLD AUTO: 0.86 K/UL — SIGNIFICANT CHANGE UP (ref 0–0.9)
MONOCYTES NFR BLD AUTO: 7.9 % — SIGNIFICANT CHANGE UP (ref 2–14)
NEUTROPHILS # BLD AUTO: 6.59 K/UL — SIGNIFICANT CHANGE UP (ref 1.8–7.4)
NEUTROPHILS NFR BLD AUTO: 60.6 % — SIGNIFICANT CHANGE UP (ref 43–77)
NRBC # BLD: 0 /100 WBCS — SIGNIFICANT CHANGE UP (ref 0–0)
PLATELET # BLD AUTO: 303 K/UL — SIGNIFICANT CHANGE UP (ref 150–400)
POTASSIUM SERPL-MCNC: 4.2 MMOL/L — SIGNIFICANT CHANGE UP (ref 3.5–5.3)
POTASSIUM SERPL-SCNC: 4.2 MMOL/L — SIGNIFICANT CHANGE UP (ref 3.5–5.3)
PROT SERPL-MCNC: 6.7 G/DL — SIGNIFICANT CHANGE UP (ref 6–8.3)
RBC # BLD: 4.62 M/UL — SIGNIFICANT CHANGE UP (ref 3.8–5.2)
RBC # FLD: 12.6 % — SIGNIFICANT CHANGE UP (ref 10.3–14.5)
SODIUM SERPL-SCNC: 140 MMOL/L — SIGNIFICANT CHANGE UP (ref 135–145)
T4 FREE SERPL-MCNC: 1.3 NG/DL — SIGNIFICANT CHANGE UP (ref 0.9–1.8)
TSH SERPL-MCNC: 6.09 UIU/ML — HIGH (ref 0.27–4.2)
WBC # BLD: 10.87 K/UL — HIGH (ref 3.8–10.5)
WBC # FLD AUTO: 10.87 K/UL — HIGH (ref 3.8–10.5)

## 2023-04-12 ENCOUNTER — RESULT REVIEW (OUTPATIENT)
Age: 63
End: 2023-04-12

## 2023-04-12 ENCOUNTER — APPOINTMENT (OUTPATIENT)
Dept: HEMATOLOGY ONCOLOGY | Facility: CLINIC | Age: 63
End: 2023-04-12
Payer: COMMERCIAL

## 2023-04-12 ENCOUNTER — APPOINTMENT (OUTPATIENT)
Dept: INFUSION THERAPY | Facility: HOSPITAL | Age: 63
End: 2023-04-12

## 2023-04-12 VITALS
SYSTOLIC BLOOD PRESSURE: 148 MMHG | OXYGEN SATURATION: 96 % | WEIGHT: 167.77 LBS | HEART RATE: 97 BPM | RESPIRATION RATE: 16 BRPM | TEMPERATURE: 96.8 F | DIASTOLIC BLOOD PRESSURE: 84 MMHG | BODY MASS INDEX: 30.56 KG/M2

## 2023-04-12 LAB
ALBUMIN SERPL ELPH-MCNC: 3.9 G/DL — SIGNIFICANT CHANGE UP (ref 3.3–5)
ALP SERPL-CCNC: 76 U/L — SIGNIFICANT CHANGE UP (ref 40–120)
ALT FLD-CCNC: 17 U/L — SIGNIFICANT CHANGE UP (ref 10–45)
ANION GAP SERPL CALC-SCNC: 12 MMOL/L — SIGNIFICANT CHANGE UP (ref 5–17)
AST SERPL-CCNC: 23 U/L — SIGNIFICANT CHANGE UP (ref 10–40)
BASOPHILS # BLD AUTO: 0.11 K/UL — SIGNIFICANT CHANGE UP (ref 0–0.2)
BASOPHILS NFR BLD AUTO: 1 % — SIGNIFICANT CHANGE UP (ref 0–2)
BILIRUB SERPL-MCNC: 0.3 MG/DL — SIGNIFICANT CHANGE UP (ref 0.2–1.2)
BUN SERPL-MCNC: 9 MG/DL — SIGNIFICANT CHANGE UP (ref 7–23)
CALCIUM SERPL-MCNC: 9.8 MG/DL — SIGNIFICANT CHANGE UP (ref 8.4–10.5)
CHLORIDE SERPL-SCNC: 107 MMOL/L — SIGNIFICANT CHANGE UP (ref 96–108)
CO2 SERPL-SCNC: 23 MMOL/L — SIGNIFICANT CHANGE UP (ref 22–31)
CREAT SERPL-MCNC: 0.6 MG/DL — SIGNIFICANT CHANGE UP (ref 0.5–1.3)
EGFR: 103 ML/MIN/1.73M2 — SIGNIFICANT CHANGE UP
EOSINOPHIL # BLD AUTO: 0.51 K/UL — HIGH (ref 0–0.5)
EOSINOPHIL NFR BLD AUTO: 4.8 % — SIGNIFICANT CHANGE UP (ref 0–6)
GLUCOSE SERPL-MCNC: 93 MG/DL — SIGNIFICANT CHANGE UP (ref 70–99)
HCT VFR BLD CALC: 44.3 % — SIGNIFICANT CHANGE UP (ref 34.5–45)
HGB BLD-MCNC: 15 G/DL — SIGNIFICANT CHANGE UP (ref 11.5–15.5)
IMM GRANULOCYTES NFR BLD AUTO: 0.5 % — SIGNIFICANT CHANGE UP (ref 0–0.9)
LYMPHOCYTES # BLD AUTO: 19 % — SIGNIFICANT CHANGE UP (ref 13–44)
LYMPHOCYTES # BLD AUTO: 2.03 K/UL — SIGNIFICANT CHANGE UP (ref 1–3.3)
MCHC RBC-ENTMCNC: 31.3 PG — SIGNIFICANT CHANGE UP (ref 27–34)
MCHC RBC-ENTMCNC: 33.9 G/DL — SIGNIFICANT CHANGE UP (ref 32–36)
MCV RBC AUTO: 92.5 FL — SIGNIFICANT CHANGE UP (ref 80–100)
MONOCYTES # BLD AUTO: 0.86 K/UL — SIGNIFICANT CHANGE UP (ref 0–0.9)
MONOCYTES NFR BLD AUTO: 8.1 % — SIGNIFICANT CHANGE UP (ref 2–14)
NEUTROPHILS # BLD AUTO: 7.11 K/UL — SIGNIFICANT CHANGE UP (ref 1.8–7.4)
NEUTROPHILS NFR BLD AUTO: 66.6 % — SIGNIFICANT CHANGE UP (ref 43–77)
NRBC # BLD: 0 /100 WBCS — SIGNIFICANT CHANGE UP (ref 0–0)
PLATELET # BLD AUTO: 326 K/UL — SIGNIFICANT CHANGE UP (ref 150–400)
POTASSIUM SERPL-MCNC: 4.4 MMOL/L — SIGNIFICANT CHANGE UP (ref 3.5–5.3)
POTASSIUM SERPL-SCNC: 4.4 MMOL/L — SIGNIFICANT CHANGE UP (ref 3.5–5.3)
PROT SERPL-MCNC: 6.7 G/DL — SIGNIFICANT CHANGE UP (ref 6–8.3)
RBC # BLD: 4.79 M/UL — SIGNIFICANT CHANGE UP (ref 3.8–5.2)
RBC # FLD: 13.2 % — SIGNIFICANT CHANGE UP (ref 10.3–14.5)
SODIUM SERPL-SCNC: 142 MMOL/L — SIGNIFICANT CHANGE UP (ref 135–145)
T4 FREE SERPL-MCNC: 1.3 NG/DL — SIGNIFICANT CHANGE UP (ref 0.9–1.8)
TSH SERPL-MCNC: 5.16 UIU/ML — HIGH (ref 0.27–4.2)
WBC # BLD: 10.67 K/UL — HIGH (ref 3.8–10.5)
WBC # FLD AUTO: 10.67 K/UL — HIGH (ref 3.8–10.5)

## 2023-04-12 PROCEDURE — 99214 OFFICE O/P EST MOD 30 MIN: CPT

## 2023-04-17 NOTE — RESULTS/DATA
[FreeTextEntry1] : -CT Chest 3/11/19:  0.8 cm lobulated nodule in the apical segment of the left upper lobe has slightly increased in size when compared to previous exam. \par \par -PET/CT 3/25/19:  Abnormal FDG-PET/CT scan. \par 1. Interval resolution of previously seen hypermetabolic left upper lung nodule with associated minimally FDG avid postradiation changes as compared to PET/CT from 10/20/2015, however with interval appearance of new hypermetabolic left upper lung nodule which is superiorly located. This is suspicious for malignancy, metastatic versus new lesion. \par 2. Interval increase in FDG avidity of previously seen left perihilar focus, with additional adjacent new foci. These are concerning for metastases. \par 3. Nonspecific diffuse esophageal hypermetabolism, not significantly changed, which may again reflect esophagitis. Please correlate clinically. \par 4. New mild diffuse hypermetabolism in the anterior oral cavity, difficult to delineate due to streak artifacts, probably physiologic salivary activity. Please correlate clinically. \par \par -Level 7 Lymph Node EBUS guided FNA 4/25/19:  Positive for malignant cells.  Metastatic Adenocarcinoma consistent with lung primary.  \par \par -CT Chest 5/9/19:  Left upper lobe 8 mm nodule may be slightly larger than the prior study. Malignancy should be considered. Multiple other bilateral lung nodules measuring up to 5 mm are unchanged.. Patchy left upper lobe consolidation is similar compared to several prior studies. \par \par -MRI Brain 5/13/19:  No evidence of brain metastases. Minimal small vessel white matter ischemic changes. No change since 12/19/2012. \par \par -CT Chest 7/15/19:  \par 1. The left upper lobe nodule has decreased in size and measures 5 mm. \par 2. The additional left upper lobe nodule and right upper lobe nodules are unchanged. \par 3. The left upper lobe opacities are unchanged since 5/9/2019. \par \par -CT Chest 9/23/19: \par 1. Since 7/15/2019, the patchy left upper lobe opacities are unchanged. \par 2. Since 7/15/2019, 5 mm left upper lobe nodule is unchanged. \par 3. Since 9/10/2018, the additional bilateral upper lobe nodules are unchanged. \par 4. Emphysema \par \par -CT Chest 11/29/19:  Left upper lobe previously described patchy opacity is unchanged since September 23, 2019. Interval resolution of a contiguous left upper lobe area of linear atelectasis adjacent to the anterior mediastinum. 5 mm right lower lobe nodular opacity new since September 23, 2019 may represent focus of impaction of a previously seen cyst. \par \par -MRI Brain 1/14/20: No mass or abnormal intracranial enhancement to suggest metastatic disease. \par \par -CT Chest 2/14/20:  Stable patchy opacity in the left upper lobe when compared to previous exam.  No significant change in the small ill-defined opacity in the superior segment of the right lower lobe when compared to previous exam. \par \par -CT Chest 5/29/20:  Stable exam. No new or enlarging lung nodule. \par \par -CT Chest 8/22/20:  8 mm right upper lobe nodule new since May 29, 2020 possibly due to opacification of a previously seen cyst with fluid. 3 month follow-up noncontrast chest CT is recommended for further evaluation given history of lung cancer. The other bilateral pulmonary nodules and the previously seen left upper lobe patchy opacity are unchanged. \par \par -CT Chest 11/13/20:  Patchy opacity in the left upper lobe is unchanged when compared to previous exams.  Stable 0.8 cm solid nodule in the right upper lobe when compared to previous exam.\par \par -CT Chest 2/8/21:  Previously described 7 mm right upper lobe pulmonary nodule demonstrate minimal if any interval decrease in size since November 13, 2020. Continued follow-up is recommended.  4 mm right upper lobe pulmonary nodule new since November 13, 2020. A 3 month follow-up noncontrast chest CT can be performed to ensure stability or resolution.  Previously described left upper lobe patchy opacity is unchanged.\par \par -CT Chest 6/11/21: Irregular bandlike opacity within left upper lobe representing posttreatment changes is stable. Previously described 4 mm right upper lobe nodule (series 2 image 44) is stable. Another 7 mm right upper lobe nodule is resolved. No new or enlarging nodule.\par \par -CT Chest 10/25/21:  No significant interval change. Stable irregular bandlike opacity within left upper lobe likely posttreatment changes. No new or enlarging nodule.  Emphysema-cystic lung disease.\par \par –CT C/A/P 3/17/22:  Stable posttreatment changes within left upper lobe. No new or enlarging nodule.  No evidence of metastatic disease within abdomen or pelvis.\par \par -CT Chest 9/2/22:  Ill-defined patchy opacity in the left upper lobe is unchanged when compared to previous exam.  Numerous irregular shaped cysts are noted within both lungs. They are unchanged when compared to previous exam.\par \par -CT C/A/P 2/17/23:  Stable ill-defined patchy and nodular opacities in the left upper lobe, may be posttreatment related. No new disease in the chest.  No metastatic disease in the abdomen or pelvis.\par \par

## 2023-04-17 NOTE — PHYSICAL EXAM
[Restricted in physically strenuous activity but ambulatory and able to carry out work of a light or sedentary nature] : Status 1- Restricted in physically strenuous activity but ambulatory and able to carry out work of a light or sedentary nature, e.g., light house work, office work [Normal] : affect appropriate [de-identified] : No icterus [de-identified] : MMM O/P Clear [de-identified] : No LAD  [de-identified] : Bibasilar wheezes  [de-identified] : S1 S2 [de-identified] : No edema  [de-identified] : No spine/CVA tenderness [de-identified] : Ambulatory

## 2023-04-17 NOTE — HISTORY OF PRESENT ILLNESS
[Disease: _____________________] : Disease: [unfilled] [de-identified] : 55F who was diagnosed with lung cancer in 2012.  She had been having recurrent URI's with wheezing.  CXR showed a nodule and she therefore had a CT scan and PET/CT scan revealing a 1.3cm left apical soft tissue tumor with hypermetabolism in left hilum, left AP window and right azygoesophageal recess.  Patient underwent EBUS with biopys of left paratracheal LN with pathology consistent with adenocarcinoma of lung primary.  She was considered to have Stage IIIA/B disease (possibly N3 disease based on right sided findings).  She was treated with sequential chemo/RT with Cisplatin and Pemetrexed chemo begun in January 2013 and RT completed in June 2013.  She was followed with observation.  Underwent SBRT to MAICOL nodule in March 2016.  Surveillance CT chest on 3/11/19 revealed an enlarging MAICOL nodule.  She was referred back to Rad-Onc.  PET/CT was obtained in late March 2019 revealing this nodule to be hypermetabolic with concern for a lower hilar LN involvement.  She is now s/p EBUS on 4/25/19 with biopsy of a Level 7 LN that is positive for metastatic adenocarcinoma consistent with lung primary.  Tumor tested PDL1 negative and contains no actionable mutations.  Started systemic therapy with Carbo/Pem/Pembro in late May 2019.  Developed Carboplatin hypersensitivity reaction during C2 and Carbo was discontinued.  Treated with Pemetrexed + Pembrolizumab going forward.  Achieved [place].  Altered to q6 week administration at patient’s insistence due to tolerability issues in July 2020.   [de-identified] : Adenocarcinoma [de-identified] : Patient continues on treatment with Pem/Pembro; achieved IA. \par She reports feeling very fatigued with significant coughing and wheezing.  She wonders whether her symptoms are attributable to allergies.  She has been using desloratadine; she reports being told to use Benadryl but has not done so.  She has an inhaler but has not been using it.  She is not taking any dexamethasone in the perichemo setting.  She has follow-up with her PCP scheduled for this week.

## 2023-04-17 NOTE — ASSESSMENT
[FreeTextEntry1] : Originally stage III NSCLC with adenocarcinoma histology s/p sequential chemo/RT completed June 2013.  Received SBRT to an enlarging MAICOL nodule in March 2016.  Surveillance CT scan in March 2019 with enlarging MAICOL nodule which was hypermetabolic on PET/CT with concern for LN involvement.  Underwent Bronch with EBUS biopsy on 4/25/19 with a Level 7 LN biopsy confirming adenocarcinoma consistent with lung primary.  Tumor tested PDL1 negative, tested TMB-High and molecular testing is negative for actionable mutations (TP53, among others).  Started systemic therapy with Carbo/Pem/Pembro in late May 2019.  Developed Carboplatin infusion reaction during C2 which was then discontinued.  Treated with Pem/Pembro going forward.  Achieved NC after C3.  Treated with maintenance Pem/Pembro with sustained response.  Altered to q6 week administration due to tolerability issues in July 2020 at patient’s insistence.  \par Restaging CT Feb 2023 with overall sustained response.  \par Recommend: \par -Continue treatment with combination Pem/Pembro as maintenance for as long as it benefits the patient administered q6 weeks due to tolerability issues.  Receives the q6 week dosing of Pembro (400mg) and usual Pemetrexed 500mg/m2 dosing at this interval.  \par -Continue Vit B12 q2 cycles and daily folic acid.  She is not taking Dex in the kathy-chemo setting\par - Trial of diphenhydramine to cover allergy symptoms.  Encouraged her to use her inhaler, particularly with the wheezes detected on exam.\par -Hypothyroidism: On Levothyroxine. Will continue to monitor TFT’s \par -Restaging CT prior to next cycle and f/u to review results.  She insisted this be in early June due to other obligations.  \par – Check out form given to patient with instructions for making appointments

## 2023-05-15 ENCOUNTER — RESULT REVIEW (OUTPATIENT)
Age: 63
End: 2023-05-15

## 2023-05-24 ENCOUNTER — OUTPATIENT (OUTPATIENT)
Dept: OUTPATIENT SERVICES | Facility: HOSPITAL | Age: 63
LOS: 1 days | Discharge: HOME | End: 2023-05-24

## 2023-05-24 DIAGNOSIS — O03.9 COMPLETE OR UNSPECIFIED SPONTANEOUS ABORTION WITHOUT COMPLICATION: Chronic | ICD-10-CM

## 2023-05-24 DIAGNOSIS — C34.90 MALIGNANT NEOPLASM OF UNSPECIFIED PART OF UNSPECIFIED BRONCHUS OR LUNG: ICD-10-CM

## 2023-05-24 DIAGNOSIS — Z85.118 PERSONAL HISTORY OF OTHER MALIGNANT NEOPLASM OF BRONCHUS AND LUNG: Chronic | ICD-10-CM

## 2023-05-24 DIAGNOSIS — Z98.890 OTHER SPECIFIED POSTPROCEDURAL STATES: Chronic | ICD-10-CM

## 2023-06-05 ENCOUNTER — OUTPATIENT (OUTPATIENT)
Dept: OUTPATIENT SERVICES | Facility: HOSPITAL | Age: 63
LOS: 1 days | End: 2023-06-05
Payer: COMMERCIAL

## 2023-06-05 ENCOUNTER — APPOINTMENT (OUTPATIENT)
Dept: CT IMAGING | Facility: IMAGING CENTER | Age: 63
End: 2023-06-05
Payer: COMMERCIAL

## 2023-06-05 DIAGNOSIS — Z85.118 PERSONAL HISTORY OF OTHER MALIGNANT NEOPLASM OF BRONCHUS AND LUNG: Chronic | ICD-10-CM

## 2023-06-05 DIAGNOSIS — C34.12 MALIGNANT NEOPLASM OF UPPER LOBE, LEFT BRONCHUS OR LUNG: ICD-10-CM

## 2023-06-05 DIAGNOSIS — O03.9 COMPLETE OR UNSPECIFIED SPONTANEOUS ABORTION WITHOUT COMPLICATION: Chronic | ICD-10-CM

## 2023-06-05 DIAGNOSIS — Z98.890 OTHER SPECIFIED POSTPROCEDURAL STATES: Chronic | ICD-10-CM

## 2023-06-05 PROCEDURE — 71260 CT THORAX DX C+: CPT

## 2023-06-05 PROCEDURE — 71260 CT THORAX DX C+: CPT | Mod: 26

## 2023-06-05 PROCEDURE — 74177 CT ABD & PELVIS W/CONTRAST: CPT

## 2023-06-05 PROCEDURE — 74177 CT ABD & PELVIS W/CONTRAST: CPT | Mod: 26

## 2023-06-07 ENCOUNTER — APPOINTMENT (OUTPATIENT)
Dept: INFUSION THERAPY | Facility: HOSPITAL | Age: 63
End: 2023-06-07

## 2023-06-07 ENCOUNTER — RESULT REVIEW (OUTPATIENT)
Age: 63
End: 2023-06-07

## 2023-06-07 ENCOUNTER — APPOINTMENT (OUTPATIENT)
Dept: HEMATOLOGY ONCOLOGY | Facility: CLINIC | Age: 63
End: 2023-06-07
Payer: COMMERCIAL

## 2023-06-07 VITALS
DIASTOLIC BLOOD PRESSURE: 84 MMHG | RESPIRATION RATE: 15 BRPM | BODY MASS INDEX: 29.88 KG/M2 | SYSTOLIC BLOOD PRESSURE: 140 MMHG | HEART RATE: 86 BPM | TEMPERATURE: 97.3 F | WEIGHT: 164.02 LBS | OXYGEN SATURATION: 96 %

## 2023-06-07 DIAGNOSIS — R11.2 NAUSEA WITH VOMITING, UNSPECIFIED: ICD-10-CM

## 2023-06-07 DIAGNOSIS — Z51.11 ENCOUNTER FOR ANTINEOPLASTIC CHEMOTHERAPY: ICD-10-CM

## 2023-06-07 LAB
ALBUMIN SERPL ELPH-MCNC: 4 G/DL — SIGNIFICANT CHANGE UP (ref 3.3–5)
ALP SERPL-CCNC: 77 U/L — SIGNIFICANT CHANGE UP (ref 40–120)
ALT FLD-CCNC: 14 U/L — SIGNIFICANT CHANGE UP (ref 10–45)
ANION GAP SERPL CALC-SCNC: 13 MMOL/L — SIGNIFICANT CHANGE UP (ref 5–17)
AST SERPL-CCNC: 21 U/L — SIGNIFICANT CHANGE UP (ref 10–40)
BASOPHILS # BLD AUTO: 0.1 K/UL — SIGNIFICANT CHANGE UP (ref 0–0.2)
BASOPHILS NFR BLD AUTO: 1 % — SIGNIFICANT CHANGE UP (ref 0–2)
BILIRUB SERPL-MCNC: 0.2 MG/DL — SIGNIFICANT CHANGE UP (ref 0.2–1.2)
BUN SERPL-MCNC: 9 MG/DL — SIGNIFICANT CHANGE UP (ref 7–23)
CALCIUM SERPL-MCNC: 9.8 MG/DL — SIGNIFICANT CHANGE UP (ref 8.4–10.5)
CHLORIDE SERPL-SCNC: 106 MMOL/L — SIGNIFICANT CHANGE UP (ref 96–108)
CO2 SERPL-SCNC: 23 MMOL/L — SIGNIFICANT CHANGE UP (ref 22–31)
CREAT SERPL-MCNC: 0.63 MG/DL — SIGNIFICANT CHANGE UP (ref 0.5–1.3)
EGFR: 101 ML/MIN/1.73M2 — SIGNIFICANT CHANGE UP
EOSINOPHIL # BLD AUTO: 0.23 K/UL — SIGNIFICANT CHANGE UP (ref 0–0.5)
EOSINOPHIL NFR BLD AUTO: 2.4 % — SIGNIFICANT CHANGE UP (ref 0–6)
GLUCOSE SERPL-MCNC: 92 MG/DL — SIGNIFICANT CHANGE UP (ref 70–99)
HCT VFR BLD CALC: 41.1 % — SIGNIFICANT CHANGE UP (ref 34.5–45)
HGB BLD-MCNC: 14.1 G/DL — SIGNIFICANT CHANGE UP (ref 11.5–15.5)
IMM GRANULOCYTES NFR BLD AUTO: 0.3 % — SIGNIFICANT CHANGE UP (ref 0–0.9)
LYMPHOCYTES # BLD AUTO: 1.97 K/UL — SIGNIFICANT CHANGE UP (ref 1–3.3)
LYMPHOCYTES # BLD AUTO: 20.3 % — SIGNIFICANT CHANGE UP (ref 13–44)
MCHC RBC-ENTMCNC: 31.5 PG — SIGNIFICANT CHANGE UP (ref 27–34)
MCHC RBC-ENTMCNC: 34.3 G/DL — SIGNIFICANT CHANGE UP (ref 32–36)
MCV RBC AUTO: 91.9 FL — SIGNIFICANT CHANGE UP (ref 80–100)
MONOCYTES # BLD AUTO: 0.71 K/UL — SIGNIFICANT CHANGE UP (ref 0–0.9)
MONOCYTES NFR BLD AUTO: 7.3 % — SIGNIFICANT CHANGE UP (ref 2–14)
NEUTROPHILS # BLD AUTO: 6.68 K/UL — SIGNIFICANT CHANGE UP (ref 1.8–7.4)
NEUTROPHILS NFR BLD AUTO: 68.7 % — SIGNIFICANT CHANGE UP (ref 43–77)
NRBC # BLD: 0 /100 WBCS — SIGNIFICANT CHANGE UP (ref 0–0)
PLATELET # BLD AUTO: 275 K/UL — SIGNIFICANT CHANGE UP (ref 150–400)
POTASSIUM SERPL-MCNC: 4.6 MMOL/L — SIGNIFICANT CHANGE UP (ref 3.5–5.3)
POTASSIUM SERPL-SCNC: 4.6 MMOL/L — SIGNIFICANT CHANGE UP (ref 3.5–5.3)
PROT SERPL-MCNC: 7.1 G/DL — SIGNIFICANT CHANGE UP (ref 6–8.3)
RBC # BLD: 4.47 M/UL — SIGNIFICANT CHANGE UP (ref 3.8–5.2)
RBC # FLD: 12.8 % — SIGNIFICANT CHANGE UP (ref 10.3–14.5)
SODIUM SERPL-SCNC: 142 MMOL/L — SIGNIFICANT CHANGE UP (ref 135–145)
WBC # BLD: 9.72 K/UL — SIGNIFICANT CHANGE UP (ref 3.8–10.5)
WBC # FLD AUTO: 9.72 K/UL — SIGNIFICANT CHANGE UP (ref 3.8–10.5)

## 2023-06-07 PROCEDURE — 99214 OFFICE O/P EST MOD 30 MIN: CPT

## 2023-06-08 ENCOUNTER — NON-APPOINTMENT (OUTPATIENT)
Age: 63
End: 2023-06-08

## 2023-06-08 LAB
T4 FREE SERPL-MCNC: 1.3 NG/DL — SIGNIFICANT CHANGE UP (ref 0.9–1.8)
TSH SERPL-MCNC: 7.36 UIU/ML — HIGH (ref 0.27–4.2)

## 2023-06-12 NOTE — RESULTS/DATA
[FreeTextEntry1] : -CT Chest 3/11/19:  0.8 cm lobulated nodule in the apical segment of the left upper lobe has slightly increased in size when compared to previous exam. \par \par -PET/CT 3/25/19:  Abnormal FDG-PET/CT scan. \par 1. Interval resolution of previously seen hypermetabolic left upper lung nodule with associated minimally FDG avid postradiation changes as compared to PET/CT from 10/20/2015, however with interval appearance of new hypermetabolic left upper lung nodule which is superiorly located. This is suspicious for malignancy, metastatic versus new lesion. \par 2. Interval increase in FDG avidity of previously seen left perihilar focus, with additional adjacent new foci. These are concerning for metastases. \par 3. Nonspecific diffuse esophageal hypermetabolism, not significantly changed, which may again reflect esophagitis. Please correlate clinically. \par 4. New mild diffuse hypermetabolism in the anterior oral cavity, difficult to delineate due to streak artifacts, probably physiologic salivary activity. Please correlate clinically. \par \par -Level 7 Lymph Node EBUS guided FNA 4/25/19:  Positive for malignant cells.  Metastatic Adenocarcinoma consistent with lung primary.  \par \par -CT Chest 5/9/19:  Left upper lobe 8 mm nodule may be slightly larger than the prior study. Malignancy should be considered. Multiple other bilateral lung nodules measuring up to 5 mm are unchanged.. Patchy left upper lobe consolidation is similar compared to several prior studies. \par \par -MRI Brain 5/13/19:  No evidence of brain metastases. Minimal small vessel white matter ischemic changes. No change since 12/19/2012. \par \par -CT Chest 7/15/19:  \par 1. The left upper lobe nodule has decreased in size and measures 5 mm. \par 2. The additional left upper lobe nodule and right upper lobe nodules are unchanged. \par 3. The left upper lobe opacities are unchanged since 5/9/2019. \par \par -CT Chest 9/23/19: \par 1. Since 7/15/2019, the patchy left upper lobe opacities are unchanged. \par 2. Since 7/15/2019, 5 mm left upper lobe nodule is unchanged. \par 3. Since 9/10/2018, the additional bilateral upper lobe nodules are unchanged. \par 4. Emphysema \par \par -CT Chest 11/29/19:  Left upper lobe previously described patchy opacity is unchanged since September 23, 2019. Interval resolution of a contiguous left upper lobe area of linear atelectasis adjacent to the anterior mediastinum. 5 mm right lower lobe nodular opacity new since September 23, 2019 may represent focus of impaction of a previously seen cyst. \par \par -MRI Brain 1/14/20: No mass or abnormal intracranial enhancement to suggest metastatic disease. \par \par -CT Chest 2/14/20:  Stable patchy opacity in the left upper lobe when compared to previous exam.  No significant change in the small ill-defined opacity in the superior segment of the right lower lobe when compared to previous exam. \par \par -CT Chest 5/29/20:  Stable exam. No new or enlarging lung nodule. \par \par -CT Chest 8/22/20:  8 mm right upper lobe nodule new since May 29, 2020 possibly due to opacification of a previously seen cyst with fluid. 3 month follow-up noncontrast chest CT is recommended for further evaluation given history of lung cancer. The other bilateral pulmonary nodules and the previously seen left upper lobe patchy opacity are unchanged. \par \par -CT Chest 11/13/20:  Patchy opacity in the left upper lobe is unchanged when compared to previous exams.  Stable 0.8 cm solid nodule in the right upper lobe when compared to previous exam.\par \par -CT Chest 2/8/21:  Previously described 7 mm right upper lobe pulmonary nodule demonstrate minimal if any interval decrease in size since November 13, 2020. Continued follow-up is recommended.  4 mm right upper lobe pulmonary nodule new since November 13, 2020. A 3 month follow-up noncontrast chest CT can be performed to ensure stability or resolution.  Previously described left upper lobe patchy opacity is unchanged.\par \par -CT Chest 6/11/21: Irregular bandlike opacity within left upper lobe representing posttreatment changes is stable. Previously described 4 mm right upper lobe nodule (series 2 image 44) is stable. Another 7 mm right upper lobe nodule is resolved. No new or enlarging nodule.\par \par -CT Chest 10/25/21:  No significant interval change. Stable irregular bandlike opacity within left upper lobe likely posttreatment changes. No new or enlarging nodule.  Emphysema-cystic lung disease.\par \par –CT C/A/P 3/17/22:  Stable posttreatment changes within left upper lobe. No new or enlarging nodule.  No evidence of metastatic disease within abdomen or pelvis.\par \par -CT Chest 9/2/22:  Ill-defined patchy opacity in the left upper lobe is unchanged when compared to previous exam.  Numerous irregular shaped cysts are noted within both lungs. They are unchanged when compared to previous exam.\par \par -CT C/A/P 2/17/23:  Stable ill-defined patchy and nodular opacities in the left upper lobe, may be posttreatment related. No new disease in the chest.  No metastatic disease in the abdomen or pelvis.\par \par Images Reviewed/Interpreted:\par \par -CT CAP 6/5/23:  Compared to the previous examination, no significant change is noted.\par \par

## 2023-06-12 NOTE — PHYSICAL EXAM
[Restricted in physically strenuous activity but ambulatory and able to carry out work of a light or sedentary nature] : Status 1- Restricted in physically strenuous activity but ambulatory and able to carry out work of a light or sedentary nature, e.g., light house work, office work [Normal] : affect appropriate [de-identified] : No icterus [de-identified] : MMM O/P Clear [de-identified] : No LAD  [de-identified] : Bibasilar wheezes  [de-identified] : S1 S2 [de-identified] : No spine/CVA tenderness [de-identified] : No edema  [de-identified] : Ambulatory

## 2023-06-12 NOTE — HISTORY OF PRESENT ILLNESS
[Disease: _____________________] : Disease: [unfilled] [de-identified] : 55F who was diagnosed with lung cancer in 2012.  She had been having recurrent URI's with wheezing.  CXR showed a nodule and she therefore had a CT scan and PET/CT scan revealing a 1.3cm left apical soft tissue tumor with hypermetabolism in left hilum, left AP window and right azygoesophageal recess.  Patient underwent EBUS with biopys of left paratracheal LN with pathology consistent with adenocarcinoma of lung primary.  She was considered to have Stage IIIA/B disease (possibly N3 disease based on right sided findings).  She was treated with sequential chemo/RT with Cisplatin and Pemetrexed chemo begun in January 2013 and RT completed in June 2013.  She was followed with observation.  Underwent SBRT to MAICOL nodule in March 2016.  Surveillance CT chest on 3/11/19 revealed an enlarging MAICOL nodule.  She was referred back to Rad-Onc.  PET/CT was obtained in late March 2019 revealing this nodule to be hypermetabolic with concern for a lower hilar LN involvement.  She is now s/p EBUS on 4/25/19 with biopsy of a Level 7 LN that is positive for metastatic adenocarcinoma consistent with lung primary.  Tumor tested PDL1 negative and contains no actionable mutations.  Started systemic therapy with Carbo/Pem/Pembro in late May 2019.  Developed Carboplatin hypersensitivity reaction during C2 and Carbo was discontinued.  Treated with Pemetrexed + Pembrolizumab going forward.  Achieved [place].  Altered to q6 week administration at patient’s insistence due to tolerability issues in July 2020.   [de-identified] : Adenocarcinoma [de-identified] : Patient presents prior to continued treatment with Pem/Pembro; achieved HI. \par She reports significant fatigue.  Discussed that she may need a dose-adjustment of her levothyroxine based on TFT’s.  She reports her PCP has been managing this and increased her dosing but she is unable to tell me when or what dose she is currently on.  \par She continues working full time. \par \par Restaging CT with sustained response.

## 2023-06-12 NOTE — ASSESSMENT
[FreeTextEntry1] : Originally stage III NSCLC with adenocarcinoma histology s/p sequential chemo/RT completed June 2013.  Received SBRT to an enlarging MAICOL nodule in March 2016.  Surveillance CT scan in March 2019 with enlarging MAICOL nodule which was hypermetabolic on PET/CT with concern for LN involvement.  Underwent Bronch with EBUS biopsy on 4/25/19 with a Level 7 LN biopsy confirming adenocarcinoma consistent with lung primary.  Tumor tested PDL1 negative, tested TMB-High and molecular testing is negative for actionable mutations (TP53, among others).  Started systemic therapy with Carbo/Pem/Pembro in late May 2019.  Developed Carboplatin infusion reaction during C2 which was then discontinued.  Treated with Pem/Pembro going forward.  Achieved MT after C3.  Treated with maintenance Pem/Pembro with sustained response.  Altered to q6 week administration due to tolerability issues in July 2020 at patient’s insistence.  \par Restaging CT June 2023 with overall sustained response.  \par Recommend: \par -Continue treatment with combination Pem/Pembro maintenance for as long as it benefits the patient administered q6 weeks due to tolerability issues.  Receives the q6 week dosing of Pembro (400mg) and usual Pemetrexed 500mg/m2 dosing at this interval.  \par -Continue Vit B12 q2 cycles and daily folic acid.  \par -Repeat labs today \par - Hypothyroidism: On Levothyroxine. Repeat TFT’s with labs today.  Will send results to PCP so that dosing of medication can be adjusted appropriately, if needed \par -F/U prior to next cycle or sooner should problems arise.  Patient declined to schedule further treatments beyond her July appointment.

## 2023-07-19 ENCOUNTER — APPOINTMENT (OUTPATIENT)
Dept: HEMATOLOGY ONCOLOGY | Facility: CLINIC | Age: 63
End: 2023-07-19
Payer: COMMERCIAL

## 2023-07-19 ENCOUNTER — RESULT REVIEW (OUTPATIENT)
Age: 63
End: 2023-07-19

## 2023-07-19 ENCOUNTER — APPOINTMENT (OUTPATIENT)
Dept: INFUSION THERAPY | Facility: HOSPITAL | Age: 63
End: 2023-07-19

## 2023-07-19 VITALS
SYSTOLIC BLOOD PRESSURE: 132 MMHG | DIASTOLIC BLOOD PRESSURE: 82 MMHG | HEART RATE: 82 BPM | BODY MASS INDEX: 30.04 KG/M2 | RESPIRATION RATE: 16 BRPM | TEMPERATURE: 97.1 F | WEIGHT: 165.32 LBS | HEIGHT: 62.13 IN | OXYGEN SATURATION: 96 %

## 2023-07-19 LAB
BASOPHILS # BLD AUTO: 0.12 K/UL — SIGNIFICANT CHANGE UP (ref 0–0.2)
BASOPHILS NFR BLD AUTO: 1.1 % — SIGNIFICANT CHANGE UP (ref 0–2)
EOSINOPHIL # BLD AUTO: 0.54 K/UL — HIGH (ref 0–0.5)
EOSINOPHIL NFR BLD AUTO: 5.1 % — SIGNIFICANT CHANGE UP (ref 0–6)
HCT VFR BLD CALC: 42.5 % — SIGNIFICANT CHANGE UP (ref 34.5–45)
HGB BLD-MCNC: 14.2 G/DL — SIGNIFICANT CHANGE UP (ref 11.5–15.5)
IMM GRANULOCYTES NFR BLD AUTO: 0.3 % — SIGNIFICANT CHANGE UP (ref 0–0.9)
LYMPHOCYTES # BLD AUTO: 2.2 K/UL — SIGNIFICANT CHANGE UP (ref 1–3.3)
LYMPHOCYTES # BLD AUTO: 20.8 % — SIGNIFICANT CHANGE UP (ref 13–44)
MCHC RBC-ENTMCNC: 30.9 PG — SIGNIFICANT CHANGE UP (ref 27–34)
MCHC RBC-ENTMCNC: 33.4 G/DL — SIGNIFICANT CHANGE UP (ref 32–36)
MCV RBC AUTO: 92.6 FL — SIGNIFICANT CHANGE UP (ref 80–100)
MONOCYTES # BLD AUTO: 0.86 K/UL — SIGNIFICANT CHANGE UP (ref 0–0.9)
MONOCYTES NFR BLD AUTO: 8.1 % — SIGNIFICANT CHANGE UP (ref 2–14)
NEUTROPHILS # BLD AUTO: 6.81 K/UL — SIGNIFICANT CHANGE UP (ref 1.8–7.4)
NEUTROPHILS NFR BLD AUTO: 64.6 % — SIGNIFICANT CHANGE UP (ref 43–77)
NRBC # BLD: 0 /100 WBCS — SIGNIFICANT CHANGE UP (ref 0–0)
PLATELET # BLD AUTO: 312 K/UL — SIGNIFICANT CHANGE UP (ref 150–400)
RBC # BLD: 4.59 M/UL — SIGNIFICANT CHANGE UP (ref 3.8–5.2)
RBC # FLD: 12.9 % — SIGNIFICANT CHANGE UP (ref 10.3–14.5)
T4 FREE SERPL-MCNC: 1.9 NG/DL — HIGH (ref 0.9–1.8)
TSH SERPL-MCNC: 2.01 UIU/ML — SIGNIFICANT CHANGE UP (ref 0.27–4.2)
WBC # BLD: 10.56 K/UL — HIGH (ref 3.8–10.5)
WBC # FLD AUTO: 10.56 K/UL — HIGH (ref 3.8–10.5)

## 2023-07-19 PROCEDURE — 99214 OFFICE O/P EST MOD 30 MIN: CPT

## 2023-07-20 LAB
ALBUMIN SERPL ELPH-MCNC: 4.1 G/DL — SIGNIFICANT CHANGE UP (ref 3.3–5)
ALP SERPL-CCNC: 82 U/L — SIGNIFICANT CHANGE UP (ref 40–120)
ALT FLD-CCNC: 16 U/L — SIGNIFICANT CHANGE UP (ref 10–45)
ANION GAP SERPL CALC-SCNC: 15 MMOL/L — SIGNIFICANT CHANGE UP (ref 5–17)
AST SERPL-CCNC: 22 U/L — SIGNIFICANT CHANGE UP (ref 10–40)
BILIRUB SERPL-MCNC: 0.3 MG/DL — SIGNIFICANT CHANGE UP (ref 0.2–1.2)
BUN SERPL-MCNC: 6 MG/DL — LOW (ref 7–23)
CALCIUM SERPL-MCNC: 9.6 MG/DL — SIGNIFICANT CHANGE UP (ref 8.4–10.5)
CHLORIDE SERPL-SCNC: 105 MMOL/L — SIGNIFICANT CHANGE UP (ref 96–108)
CO2 SERPL-SCNC: 21 MMOL/L — LOW (ref 22–31)
CREAT SERPL-MCNC: 0.62 MG/DL — SIGNIFICANT CHANGE UP (ref 0.5–1.3)
EGFR: 101 ML/MIN/1.73M2 — SIGNIFICANT CHANGE UP
GLUCOSE SERPL-MCNC: 94 MG/DL — SIGNIFICANT CHANGE UP (ref 70–99)
POTASSIUM SERPL-MCNC: 4.5 MMOL/L — SIGNIFICANT CHANGE UP (ref 3.5–5.3)
POTASSIUM SERPL-SCNC: 4.5 MMOL/L — SIGNIFICANT CHANGE UP (ref 3.5–5.3)
PROT SERPL-MCNC: 7.2 G/DL — SIGNIFICANT CHANGE UP (ref 6–8.3)
SODIUM SERPL-SCNC: 141 MMOL/L — SIGNIFICANT CHANGE UP (ref 135–145)

## 2023-07-21 NOTE — RESULTS/DATA
[FreeTextEntry1] : -CT Chest 3/11/19:  0.8 cm lobulated nodule in the apical segment of the left upper lobe has slightly increased in size when compared to previous exam. \par \par -PET/CT 3/25/19:  Abnormal FDG-PET/CT scan. \par 1. Interval resolution of previously seen hypermetabolic left upper lung nodule with associated minimally FDG avid postradiation changes as compared to PET/CT from 10/20/2015, however with interval appearance of new hypermetabolic left upper lung nodule which is superiorly located. This is suspicious for malignancy, metastatic versus new lesion. \par 2. Interval increase in FDG avidity of previously seen left perihilar focus, with additional adjacent new foci. These are concerning for metastases. \par 3. Nonspecific diffuse esophageal hypermetabolism, not significantly changed, which may again reflect esophagitis. Please correlate clinically. \par 4. New mild diffuse hypermetabolism in the anterior oral cavity, difficult to delineate due to streak artifacts, probably physiologic salivary activity. Please correlate clinically. \par \par -Level 7 Lymph Node EBUS guided FNA 4/25/19:  Positive for malignant cells.  Metastatic Adenocarcinoma consistent with lung primary.  \par \par -CT Chest 5/9/19:  Left upper lobe 8 mm nodule may be slightly larger than the prior study. Malignancy should be considered. Multiple other bilateral lung nodules measuring up to 5 mm are unchanged.. Patchy left upper lobe consolidation is similar compared to several prior studies. \par \par -MRI Brain 5/13/19:  No evidence of brain metastases. Minimal small vessel white matter ischemic changes. No change since 12/19/2012. \par \par -CT Chest 7/15/19:  \par 1. The left upper lobe nodule has decreased in size and measures 5 mm. \par 2. The additional left upper lobe nodule and right upper lobe nodules are unchanged. \par 3. The left upper lobe opacities are unchanged since 5/9/2019. \par \par -CT Chest 9/23/19: \par 1. Since 7/15/2019, the patchy left upper lobe opacities are unchanged. \par 2. Since 7/15/2019, 5 mm left upper lobe nodule is unchanged. \par 3. Since 9/10/2018, the additional bilateral upper lobe nodules are unchanged. \par 4. Emphysema \par \par -CT Chest 11/29/19:  Left upper lobe previously described patchy opacity is unchanged since September 23, 2019. Interval resolution of a contiguous left upper lobe area of linear atelectasis adjacent to the anterior mediastinum. 5 mm right lower lobe nodular opacity new since September 23, 2019 may represent focus of impaction of a previously seen cyst. \par \par -MRI Brain 1/14/20: No mass or abnormal intracranial enhancement to suggest metastatic disease. \par \par -CT Chest 2/14/20:  Stable patchy opacity in the left upper lobe when compared to previous exam.  No significant change in the small ill-defined opacity in the superior segment of the right lower lobe when compared to previous exam. \par \par -CT Chest 5/29/20:  Stable exam. No new or enlarging lung nodule. \par \par -CT Chest 8/22/20:  8 mm right upper lobe nodule new since May 29, 2020 possibly due to opacification of a previously seen cyst with fluid. 3 month follow-up noncontrast chest CT is recommended for further evaluation given history of lung cancer. The other bilateral pulmonary nodules and the previously seen left upper lobe patchy opacity are unchanged. \par \par -CT Chest 11/13/20:  Patchy opacity in the left upper lobe is unchanged when compared to previous exams.  Stable 0.8 cm solid nodule in the right upper lobe when compared to previous exam.\par \par -CT Chest 2/8/21:  Previously described 7 mm right upper lobe pulmonary nodule demonstrate minimal if any interval decrease in size since November 13, 2020. Continued follow-up is recommended.  4 mm right upper lobe pulmonary nodule new since November 13, 2020. A 3 month follow-up noncontrast chest CT can be performed to ensure stability or resolution.  Previously described left upper lobe patchy opacity is unchanged.\par \par -CT Chest 6/11/21: Irregular bandlike opacity within left upper lobe representing posttreatment changes is stable. Previously described 4 mm right upper lobe nodule (series 2 image 44) is stable. Another 7 mm right upper lobe nodule is resolved. No new or enlarging nodule.\par \par -CT Chest 10/25/21:  No significant interval change. Stable irregular bandlike opacity within left upper lobe likely posttreatment changes. No new or enlarging nodule.  Emphysema-cystic lung disease.\par \par –CT C/A/P 3/17/22:  Stable posttreatment changes within left upper lobe. No new or enlarging nodule.  No evidence of metastatic disease within abdomen or pelvis.\par \par -CT Chest 9/2/22:  Ill-defined patchy opacity in the left upper lobe is unchanged when compared to previous exam.  Numerous irregular shaped cysts are noted within both lungs. They are unchanged when compared to previous exam.\par \par -CT C/A/P 2/17/23:  Stable ill-defined patchy and nodular opacities in the left upper lobe, may be posttreatment related. No new disease in the chest.  No metastatic disease in the abdomen or pelvis.\par \par -CT CAP 6/5/23:  Compared to the previous examination, no significant change is noted.\par \par

## 2023-07-21 NOTE — PHYSICAL EXAM
[Restricted in physically strenuous activity but ambulatory and able to carry out work of a light or sedentary nature] : Status 1- Restricted in physically strenuous activity but ambulatory and able to carry out work of a light or sedentary nature, e.g., light house work, office work [Normal] : affect appropriate [de-identified] : No icterus [de-identified] : MMM O/P Clear [de-identified] : No LAD  [de-identified] : Bibasilar wheezes  [de-identified] : S1 S2 [de-identified] : No edema  [de-identified] : No spine/CVA tenderness [de-identified] : Ambulatory

## 2023-07-21 NOTE — HISTORY OF PRESENT ILLNESS
[Disease: _____________________] : Disease: [unfilled] [de-identified] : 55F who was diagnosed with lung cancer in 2012.  She had been having recurrent URI's with wheezing.  CXR showed a nodule and she therefore had a CT scan and PET/CT scan revealing a 1.3cm left apical soft tissue tumor with hypermetabolism in left hilum, left AP window and right azygoesophageal recess.  Patient underwent EBUS with biopys of left paratracheal LN with pathology consistent with adenocarcinoma of lung primary.  She was considered to have Stage IIIA/B disease (possibly N3 disease based on right sided findings).  She was treated with sequential chemo/RT with Cisplatin and Pemetrexed chemo begun in January 2013 and RT completed in June 2013.  She was followed with observation.  Underwent SBRT to MAICOL nodule in March 2016.  Surveillance CT chest on 3/11/19 revealed an enlarging MAICOL nodule.  She was referred back to Rad-Onc.  PET/CT was obtained in late March 2019 revealing this nodule to be hypermetabolic with concern for a lower hilar LN involvement.  She is now s/p EBUS on 4/25/19 with biopsy of a Level 7 LN that is positive for metastatic adenocarcinoma consistent with lung primary.  Tumor tested PDL1 negative and contains no actionable mutations.  Started systemic therapy with Carbo/Pem/Pembro in late May 2019.  Developed Carboplatin hypersensitivity reaction during C2 and Carbo was discontinued.  Treated with Pemetrexed + Pembrolizumab going forward.  Achieved CO.  Altered to q6 week administration at patient’s insistence due to tolerability issues in July 2020.   [de-identified] : Adenocarcinoma [de-identified] : Patient presents prior to continued treatment with Pem/Pembro; achieved IN. \par She reports that her levothyroxine dose was increased to 175mcg by her PCP in June. She was also placed on Losartan by her nephrologist for "protein in her urine".  She is otherwise stable with manageable fatigue with no new complaints. \par She continues working full time. \par \par

## 2023-07-21 NOTE — ASSESSMENT
[FreeTextEntry1] : Originally stage III NSCLC with adenocarcinoma histology s/p sequential chemo/RT completed June 2013.  Received SBRT to an enlarging MAICOL nodule in March 2016.  Surveillance CT scan in March 2019 with enlarging MAICOL nodule which was hypermetabolic on PET/CT with concern for LN involvement.  Underwent Bronch with EBUS biopsy on 4/25/19 with a Level 7 LN biopsy confirming adenocarcinoma consistent with lung primary.  Tumor tested PDL1 negative, tested TMB-High and molecular testing is negative for actionable mutations (TP53, among others).  Started systemic therapy with Carbo/Pem/Pembro in late May 2019.  Developed Carboplatin infusion reaction during C2 which was then discontinued.  Treated with Pem/Pembro going forward.  Achieved AK after C3.  Treated with maintenance Pem/Pembro with sustained response.  Altered to q6 week administration due to tolerability issues in July 2020 at patient’s insistence.  \par Restaging CT June 2023 with overall sustained response.  \par Recommend: \par -Continue treatment with combination Pem/Pembro maintenance for as long as it benefits the patient administered q6 weeks due to tolerability issues.  Receives the q6 week dosing of Pembro (400mg) and usual Pemetrexed 500mg/m2 dosing at this interval.  \par -Continue Vit B12 q2 cycles and daily folic acid.  \par -Repeat labs today \par - Hypothyroidism: On Levothyroxine. Repeat TFT’s with labs today; PCP increased dose to 175mcg in June. \par -F/U prior to next cycle or sooner should problems arise.  \par -Information sheet given with directions for scheduling appointments; regular interval for August and then she states that she wishes to delay her treatment due on 10/11 as she will be attending a wedding on 10/22. She is choosing not to be treated until 10/25; discussed that he will be due for imaging prior to that appointment.

## 2023-08-22 ENCOUNTER — OUTPATIENT (OUTPATIENT)
Dept: OUTPATIENT SERVICES | Facility: HOSPITAL | Age: 63
LOS: 1 days | Discharge: HOME | End: 2023-08-22

## 2023-08-22 DIAGNOSIS — C34.90 MALIGNANT NEOPLASM OF UNSPECIFIED PART OF UNSPECIFIED BRONCHUS OR LUNG: ICD-10-CM

## 2023-08-22 DIAGNOSIS — O03.9 COMPLETE OR UNSPECIFIED SPONTANEOUS ABORTION WITHOUT COMPLICATION: Chronic | ICD-10-CM

## 2023-08-22 DIAGNOSIS — Z98.890 OTHER SPECIFIED POSTPROCEDURAL STATES: Chronic | ICD-10-CM

## 2023-08-22 DIAGNOSIS — Z85.118 PERSONAL HISTORY OF OTHER MALIGNANT NEOPLASM OF BRONCHUS AND LUNG: Chronic | ICD-10-CM

## 2023-08-28 ENCOUNTER — NON-APPOINTMENT (OUTPATIENT)
Age: 63
End: 2023-08-28

## 2023-08-30 ENCOUNTER — APPOINTMENT (OUTPATIENT)
Dept: HEMATOLOGY ONCOLOGY | Facility: CLINIC | Age: 63
End: 2023-08-30

## 2023-08-30 ENCOUNTER — APPOINTMENT (OUTPATIENT)
Dept: INFUSION THERAPY | Facility: HOSPITAL | Age: 63
End: 2023-08-30

## 2023-09-18 ENCOUNTER — NON-APPOINTMENT (OUTPATIENT)
Age: 63
End: 2023-09-18

## 2023-09-18 ENCOUNTER — APPOINTMENT (OUTPATIENT)
Dept: THORACIC SURGERY | Facility: CLINIC | Age: 63
End: 2023-09-18
Payer: COMMERCIAL

## 2023-09-18 ENCOUNTER — APPOINTMENT (OUTPATIENT)
Dept: RADIOLOGY | Facility: HOSPITAL | Age: 63
End: 2023-09-18

## 2023-09-18 ENCOUNTER — OUTPATIENT (OUTPATIENT)
Dept: OUTPATIENT SERVICES | Facility: HOSPITAL | Age: 63
LOS: 1 days | End: 2023-09-18
Payer: COMMERCIAL

## 2023-09-18 VITALS
OXYGEN SATURATION: 98 % | DIASTOLIC BLOOD PRESSURE: 73 MMHG | HEART RATE: 60 BPM | SYSTOLIC BLOOD PRESSURE: 115 MMHG | RESPIRATION RATE: 16 BRPM | HEIGHT: 60 IN | WEIGHT: 159 LBS | BODY MASS INDEX: 31.22 KG/M2

## 2023-09-18 DIAGNOSIS — J93.9 PNEUMOTHORAX, UNSPECIFIED: ICD-10-CM

## 2023-09-18 DIAGNOSIS — Z98.890 OTHER SPECIFIED POSTPROCEDURAL STATES: Chronic | ICD-10-CM

## 2023-09-18 PROCEDURE — 71046 X-RAY EXAM CHEST 2 VIEWS: CPT | Mod: 26

## 2023-09-18 PROCEDURE — 99204 OFFICE O/P NEW MOD 45 MIN: CPT

## 2023-09-18 RX ORDER — ATORVASTATIN CALCIUM 40 MG/1
40 TABLET, FILM COATED ORAL
Refills: 0 | Status: ACTIVE | COMMUNITY

## 2023-09-18 RX ORDER — ZOLPIDEM TARTRATE 12.5 MG/1
12.5 TABLET, EXTENDED RELEASE ORAL
Qty: 30 | Refills: 0 | Status: COMPLETED | COMMUNITY
Start: 2020-10-15 | End: 2023-09-18

## 2023-09-18 RX ORDER — METOCLOPRAMIDE 10 MG/1
10 TABLET ORAL
Qty: 60 | Refills: 5 | Status: COMPLETED | COMMUNITY
Start: 2020-04-22 | End: 2023-09-18

## 2023-09-18 RX ORDER — OMEPRAZOLE 40 MG/1
40 CAPSULE, DELAYED RELEASE ORAL
Qty: 30 | Refills: 5 | Status: COMPLETED | COMMUNITY
Start: 2020-06-02 | End: 2023-09-18

## 2023-09-29 ENCOUNTER — APPOINTMENT (OUTPATIENT)
Dept: HEMATOLOGY ONCOLOGY | Facility: CLINIC | Age: 63
End: 2023-09-29

## 2023-09-29 ENCOUNTER — APPOINTMENT (OUTPATIENT)
Dept: INFUSION THERAPY | Facility: HOSPITAL | Age: 63
End: 2023-09-29

## 2023-09-29 ENCOUNTER — APPOINTMENT (OUTPATIENT)
Dept: HEMATOLOGY ONCOLOGY | Facility: CLINIC | Age: 63
End: 2023-09-29
Payer: COMMERCIAL

## 2023-09-29 PROCEDURE — 99215 OFFICE O/P EST HI 40 MIN: CPT | Mod: 95

## 2023-09-29 RX ORDER — LORAZEPAM 0.5 MG/1
0.5 TABLET ORAL
Refills: 0 | Status: ACTIVE | COMMUNITY

## 2023-09-29 RX ORDER — LEVOTHYROXINE SODIUM 175 UG/1
175 CAPSULE ORAL
Refills: 0 | Status: ACTIVE | COMMUNITY

## 2023-09-29 RX ORDER — ASPIRIN 81 MG
81 TABLET, DELAYED RELEASE (ENTERIC COATED) ORAL
Refills: 0 | Status: ACTIVE | COMMUNITY

## 2023-10-01 PROBLEM — Z92.3 HISTORY OF RADIATION THERAPY: Status: RESOLVED | Noted: 2019-03-15 | Resolved: 2023-10-01

## 2023-10-02 ENCOUNTER — APPOINTMENT (OUTPATIENT)
Dept: PULMONOLOGY | Facility: CLINIC | Age: 63
End: 2023-10-02
Payer: COMMERCIAL

## 2023-10-02 VITALS
DIASTOLIC BLOOD PRESSURE: 70 MMHG | HEART RATE: 75 BPM | WEIGHT: 160 LBS | OXYGEN SATURATION: 96 % | SYSTOLIC BLOOD PRESSURE: 128 MMHG | TEMPERATURE: 96.8 F | BODY MASS INDEX: 31.25 KG/M2

## 2023-10-02 PROCEDURE — 99203 OFFICE O/P NEW LOW 30 MIN: CPT

## 2023-10-02 RX ORDER — NICOTINE 21 MG/24HR
14 PATCH, TRANSDERMAL 24 HOURS TRANSDERMAL DAILY
Qty: 30 | Refills: 1 | Status: ACTIVE | COMMUNITY
Start: 2023-10-02 | End: 1900-01-01

## 2023-10-02 RX ORDER — NICOTINE 21 MG/24HR
21 PATCH, TRANSDERMAL 24 HOURS TRANSDERMAL DAILY
Qty: 1 | Refills: 3 | Status: DISCONTINUED | COMMUNITY
Start: 1900-01-01 | End: 2023-10-02

## 2023-10-22 ENCOUNTER — OUTPATIENT (OUTPATIENT)
Dept: OUTPATIENT SERVICES | Facility: HOSPITAL | Age: 63
LOS: 1 days | Discharge: HOME | End: 2023-10-22
Payer: COMMERCIAL

## 2023-10-22 DIAGNOSIS — Z85.118 PERSONAL HISTORY OF OTHER MALIGNANT NEOPLASM OF BRONCHUS AND LUNG: Chronic | ICD-10-CM

## 2023-10-22 DIAGNOSIS — C34.90 MALIGNANT NEOPLASM OF UNSPECIFIED PART OF UNSPECIFIED BRONCHUS OR LUNG: ICD-10-CM

## 2023-10-22 DIAGNOSIS — O03.9 COMPLETE OR UNSPECIFIED SPONTANEOUS ABORTION WITHOUT COMPLICATION: Chronic | ICD-10-CM

## 2023-10-22 DIAGNOSIS — Z98.890 OTHER SPECIFIED POSTPROCEDURAL STATES: Chronic | ICD-10-CM

## 2023-10-25 ENCOUNTER — RESULT REVIEW (OUTPATIENT)
Age: 63
End: 2023-10-25

## 2023-10-25 ENCOUNTER — APPOINTMENT (OUTPATIENT)
Dept: HEMATOLOGY ONCOLOGY | Facility: CLINIC | Age: 63
End: 2023-10-25
Payer: COMMERCIAL

## 2023-10-25 ENCOUNTER — NON-APPOINTMENT (OUTPATIENT)
Age: 63
End: 2023-10-25

## 2023-10-25 ENCOUNTER — APPOINTMENT (OUTPATIENT)
Dept: INFUSION THERAPY | Facility: HOSPITAL | Age: 63
End: 2023-10-25

## 2023-10-25 VITALS
TEMPERATURE: 96.7 F | RESPIRATION RATE: 16 BRPM | SYSTOLIC BLOOD PRESSURE: 128 MMHG | OXYGEN SATURATION: 98 % | WEIGHT: 164.68 LBS | BODY MASS INDEX: 32.16 KG/M2 | DIASTOLIC BLOOD PRESSURE: 80 MMHG | HEART RATE: 66 BPM

## 2023-10-25 DIAGNOSIS — Z51.11 ENCOUNTER FOR ANTINEOPLASTIC CHEMOTHERAPY: ICD-10-CM

## 2023-10-25 DIAGNOSIS — R11.2 NAUSEA WITH VOMITING, UNSPECIFIED: ICD-10-CM

## 2023-10-25 LAB
ALBUMIN SERPL ELPH-MCNC: 3.9 G/DL — SIGNIFICANT CHANGE UP (ref 3.3–5)
ALBUMIN SERPL ELPH-MCNC: 3.9 G/DL — SIGNIFICANT CHANGE UP (ref 3.3–5)
ALP SERPL-CCNC: 55 U/L — SIGNIFICANT CHANGE UP (ref 40–120)
ALP SERPL-CCNC: 55 U/L — SIGNIFICANT CHANGE UP (ref 40–120)
ALT FLD-CCNC: 16 U/L — SIGNIFICANT CHANGE UP (ref 10–45)
ALT FLD-CCNC: 16 U/L — SIGNIFICANT CHANGE UP (ref 10–45)
ANION GAP SERPL CALC-SCNC: 10 MMOL/L — SIGNIFICANT CHANGE UP (ref 5–17)
ANION GAP SERPL CALC-SCNC: 10 MMOL/L — SIGNIFICANT CHANGE UP (ref 5–17)
AST SERPL-CCNC: 28 U/L — SIGNIFICANT CHANGE UP (ref 10–40)
AST SERPL-CCNC: 28 U/L — SIGNIFICANT CHANGE UP (ref 10–40)
BASOPHILS # BLD AUTO: 0.11 K/UL — SIGNIFICANT CHANGE UP (ref 0–0.2)
BASOPHILS # BLD AUTO: 0.11 K/UL — SIGNIFICANT CHANGE UP (ref 0–0.2)
BASOPHILS NFR BLD AUTO: 1 % — SIGNIFICANT CHANGE UP (ref 0–2)
BASOPHILS NFR BLD AUTO: 1 % — SIGNIFICANT CHANGE UP (ref 0–2)
BILIRUB SERPL-MCNC: 0.2 MG/DL — SIGNIFICANT CHANGE UP (ref 0.2–1.2)
BILIRUB SERPL-MCNC: 0.2 MG/DL — SIGNIFICANT CHANGE UP (ref 0.2–1.2)
BUN SERPL-MCNC: 7 MG/DL — SIGNIFICANT CHANGE UP (ref 7–23)
BUN SERPL-MCNC: 7 MG/DL — SIGNIFICANT CHANGE UP (ref 7–23)
CALCIUM SERPL-MCNC: 9.7 MG/DL — SIGNIFICANT CHANGE UP (ref 8.4–10.5)
CALCIUM SERPL-MCNC: 9.7 MG/DL — SIGNIFICANT CHANGE UP (ref 8.4–10.5)
CHLORIDE SERPL-SCNC: 107 MMOL/L — SIGNIFICANT CHANGE UP (ref 96–108)
CHLORIDE SERPL-SCNC: 107 MMOL/L — SIGNIFICANT CHANGE UP (ref 96–108)
CO2 SERPL-SCNC: 24 MMOL/L — SIGNIFICANT CHANGE UP (ref 22–31)
CO2 SERPL-SCNC: 24 MMOL/L — SIGNIFICANT CHANGE UP (ref 22–31)
CREAT SERPL-MCNC: 0.56 MG/DL — SIGNIFICANT CHANGE UP (ref 0.5–1.3)
CREAT SERPL-MCNC: 0.56 MG/DL — SIGNIFICANT CHANGE UP (ref 0.5–1.3)
EGFR: 104 ML/MIN/1.73M2 — SIGNIFICANT CHANGE UP
EGFR: 104 ML/MIN/1.73M2 — SIGNIFICANT CHANGE UP
EOSINOPHIL # BLD AUTO: 1.09 K/UL — HIGH (ref 0–0.5)
EOSINOPHIL # BLD AUTO: 1.09 K/UL — HIGH (ref 0–0.5)
EOSINOPHIL NFR BLD AUTO: 10.1 % — HIGH (ref 0–6)
EOSINOPHIL NFR BLD AUTO: 10.1 % — HIGH (ref 0–6)
GLUCOSE SERPL-MCNC: 97 MG/DL — SIGNIFICANT CHANGE UP (ref 70–99)
GLUCOSE SERPL-MCNC: 97 MG/DL — SIGNIFICANT CHANGE UP (ref 70–99)
HCT VFR BLD CALC: 37.4 % — SIGNIFICANT CHANGE UP (ref 34.5–45)
HCT VFR BLD CALC: 37.4 % — SIGNIFICANT CHANGE UP (ref 34.5–45)
HGB BLD-MCNC: 13 G/DL — SIGNIFICANT CHANGE UP (ref 11.5–15.5)
HGB BLD-MCNC: 13 G/DL — SIGNIFICANT CHANGE UP (ref 11.5–15.5)
IMM GRANULOCYTES NFR BLD AUTO: 0.3 % — SIGNIFICANT CHANGE UP (ref 0–0.9)
IMM GRANULOCYTES NFR BLD AUTO: 0.3 % — SIGNIFICANT CHANGE UP (ref 0–0.9)
LYMPHOCYTES # BLD AUTO: 2.99 K/UL — SIGNIFICANT CHANGE UP (ref 1–3.3)
LYMPHOCYTES # BLD AUTO: 2.99 K/UL — SIGNIFICANT CHANGE UP (ref 1–3.3)
LYMPHOCYTES # BLD AUTO: 27.6 % — SIGNIFICANT CHANGE UP (ref 13–44)
LYMPHOCYTES # BLD AUTO: 27.6 % — SIGNIFICANT CHANGE UP (ref 13–44)
MCHC RBC-ENTMCNC: 31.6 PG — SIGNIFICANT CHANGE UP (ref 27–34)
MCHC RBC-ENTMCNC: 31.6 PG — SIGNIFICANT CHANGE UP (ref 27–34)
MCHC RBC-ENTMCNC: 34.8 G/DL — SIGNIFICANT CHANGE UP (ref 32–36)
MCHC RBC-ENTMCNC: 34.8 G/DL — SIGNIFICANT CHANGE UP (ref 32–36)
MCV RBC AUTO: 91 FL — SIGNIFICANT CHANGE UP (ref 80–100)
MCV RBC AUTO: 91 FL — SIGNIFICANT CHANGE UP (ref 80–100)
MONOCYTES # BLD AUTO: 1.02 K/UL — HIGH (ref 0–0.9)
MONOCYTES # BLD AUTO: 1.02 K/UL — HIGH (ref 0–0.9)
MONOCYTES NFR BLD AUTO: 9.4 % — SIGNIFICANT CHANGE UP (ref 2–14)
MONOCYTES NFR BLD AUTO: 9.4 % — SIGNIFICANT CHANGE UP (ref 2–14)
NEUTROPHILS # BLD AUTO: 5.59 K/UL — SIGNIFICANT CHANGE UP (ref 1.8–7.4)
NEUTROPHILS # BLD AUTO: 5.59 K/UL — SIGNIFICANT CHANGE UP (ref 1.8–7.4)
NEUTROPHILS NFR BLD AUTO: 51.6 % — SIGNIFICANT CHANGE UP (ref 43–77)
NEUTROPHILS NFR BLD AUTO: 51.6 % — SIGNIFICANT CHANGE UP (ref 43–77)
NRBC # BLD: 0 /100 WBCS — SIGNIFICANT CHANGE UP (ref 0–0)
NRBC # BLD: 0 /100 WBCS — SIGNIFICANT CHANGE UP (ref 0–0)
PLATELET # BLD AUTO: 274 K/UL — SIGNIFICANT CHANGE UP (ref 150–400)
PLATELET # BLD AUTO: 274 K/UL — SIGNIFICANT CHANGE UP (ref 150–400)
POTASSIUM SERPL-MCNC: 4.2 MMOL/L — SIGNIFICANT CHANGE UP (ref 3.5–5.3)
POTASSIUM SERPL-MCNC: 4.2 MMOL/L — SIGNIFICANT CHANGE UP (ref 3.5–5.3)
POTASSIUM SERPL-SCNC: 4.2 MMOL/L — SIGNIFICANT CHANGE UP (ref 3.5–5.3)
POTASSIUM SERPL-SCNC: 4.2 MMOL/L — SIGNIFICANT CHANGE UP (ref 3.5–5.3)
PROT SERPL-MCNC: 7 G/DL — SIGNIFICANT CHANGE UP (ref 6–8.3)
PROT SERPL-MCNC: 7 G/DL — SIGNIFICANT CHANGE UP (ref 6–8.3)
RBC # BLD: 4.11 M/UL — SIGNIFICANT CHANGE UP (ref 3.8–5.2)
RBC # BLD: 4.11 M/UL — SIGNIFICANT CHANGE UP (ref 3.8–5.2)
RBC # FLD: 12.2 % — SIGNIFICANT CHANGE UP (ref 10.3–14.5)
RBC # FLD: 12.2 % — SIGNIFICANT CHANGE UP (ref 10.3–14.5)
SODIUM SERPL-SCNC: 141 MMOL/L — SIGNIFICANT CHANGE UP (ref 135–145)
SODIUM SERPL-SCNC: 141 MMOL/L — SIGNIFICANT CHANGE UP (ref 135–145)
T4 FREE SERPL-MCNC: 2 NG/DL — HIGH (ref 0.9–1.8)
T4 FREE SERPL-MCNC: 2 NG/DL — HIGH (ref 0.9–1.8)
TSH SERPL-MCNC: 0.11 UIU/ML — LOW (ref 0.27–4.2)
TSH SERPL-MCNC: 0.11 UIU/ML — LOW (ref 0.27–4.2)
WBC # BLD: 10.83 K/UL — HIGH (ref 3.8–10.5)
WBC # BLD: 10.83 K/UL — HIGH (ref 3.8–10.5)
WBC # FLD AUTO: 10.83 K/UL — HIGH (ref 3.8–10.5)
WBC # FLD AUTO: 10.83 K/UL — HIGH (ref 3.8–10.5)

## 2023-10-25 PROCEDURE — 99215 OFFICE O/P EST HI 40 MIN: CPT

## 2023-10-25 RX ORDER — SACUBITRIL AND VALSARTAN 24; 26 MG/1; MG/1
24-26 TABLET, FILM COATED ORAL
Refills: 0 | Status: ACTIVE | COMMUNITY

## 2023-10-25 RX ORDER — LOSARTAN POTASSIUM 25 MG/1
25 TABLET, FILM COATED ORAL
Refills: 0 | Status: DISCONTINUED | COMMUNITY
End: 2023-10-25

## 2023-10-25 RX ORDER — METOPROLOL TARTRATE 25 MG/1
25 TABLET, FILM COATED ORAL
Refills: 0 | Status: ACTIVE | COMMUNITY

## 2023-10-30 ENCOUNTER — NON-APPOINTMENT (OUTPATIENT)
Age: 63
End: 2023-10-30

## 2023-10-31 ENCOUNTER — APPOINTMENT (OUTPATIENT)
Dept: PULMONOLOGY | Facility: CLINIC | Age: 63
End: 2023-10-31
Payer: COMMERCIAL

## 2023-10-31 PROCEDURE — 99214 OFFICE O/P EST MOD 30 MIN: CPT | Mod: 25

## 2023-10-31 PROCEDURE — 94729 DIFFUSING CAPACITY: CPT

## 2023-10-31 PROCEDURE — 94727 GAS DIL/WSHOT DETER LNG VOL: CPT

## 2023-10-31 PROCEDURE — 94060 EVALUATION OF WHEEZING: CPT

## 2023-10-31 RX ORDER — ALBUTEROL SULFATE 90 UG/1
108 (90 BASE) INHALANT RESPIRATORY (INHALATION)
Qty: 1 | Refills: 3 | Status: ACTIVE | COMMUNITY
Start: 2023-10-31 | End: 1900-01-01

## 2023-11-01 ENCOUNTER — APPOINTMENT (OUTPATIENT)
Dept: UROLOGY | Facility: CLINIC | Age: 63
End: 2023-11-01
Payer: COMMERCIAL

## 2023-11-01 VITALS
SYSTOLIC BLOOD PRESSURE: 116 MMHG | HEART RATE: 72 BPM | WEIGHT: 164 LBS | HEIGHT: 60 IN | DIASTOLIC BLOOD PRESSURE: 61 MMHG | RESPIRATION RATE: 15 BRPM | TEMPERATURE: 97.2 F | OXYGEN SATURATION: 93 % | BODY MASS INDEX: 32.2 KG/M2

## 2023-11-01 PROCEDURE — 99204 OFFICE O/P NEW MOD 45 MIN: CPT

## 2023-11-06 ENCOUNTER — RESULT REVIEW (OUTPATIENT)
Age: 63
End: 2023-11-06

## 2023-11-06 ENCOUNTER — APPOINTMENT (OUTPATIENT)
Dept: CARDIOLOGY | Facility: CLINIC | Age: 63
End: 2023-11-06
Payer: COMMERCIAL

## 2023-11-06 VITALS
DIASTOLIC BLOOD PRESSURE: 67 MMHG | HEIGHT: 60 IN | HEART RATE: 60 BPM | SYSTOLIC BLOOD PRESSURE: 104 MMHG | WEIGHT: 166.45 LBS | TEMPERATURE: 97.5 F | BODY MASS INDEX: 32.68 KG/M2 | OXYGEN SATURATION: 97 %

## 2023-11-06 DIAGNOSIS — M79.89 OTHER SPECIFIED SOFT TISSUE DISORDERS: ICD-10-CM

## 2023-11-06 DIAGNOSIS — R91.8 OTHER NONSPECIFIC ABNORMAL FINDING OF LUNG FIELD: ICD-10-CM

## 2023-11-06 DIAGNOSIS — N39.0 URINARY TRACT INFECTION, SITE NOT SPECIFIED: ICD-10-CM

## 2023-11-06 LAB
CK SERPL-CCNC: 58 U/L
CRP SERPL HS-MCNC: 2.44 MG/L
ERYTHROCYTE [SEDIMENTATION RATE] IN BLOOD: 39 MM/HR — HIGH (ref 0–20)
ERYTHROCYTE [SEDIMENTATION RATE] IN BLOOD: 39 MM/HR — HIGH (ref 0–20)
NT-PROBNP SERPL-MCNC: 324 PG/ML
TROPONIN I SERPL-MCNC: <0.01 NG/ML
TROPONIN-T, HIGH SENSITIVITY: 9 NG/L
URINE CYTOLOGY: NORMAL

## 2023-11-06 PROCEDURE — 93000 ELECTROCARDIOGRAM COMPLETE: CPT

## 2023-11-06 PROCEDURE — 99205 OFFICE O/P NEW HI 60 MIN: CPT

## 2023-11-06 PROCEDURE — 93010 ELECTROCARDIOGRAM REPORT: CPT

## 2023-11-06 RX ORDER — LOSARTAN POTASSIUM 100 MG/1
TABLET, FILM COATED ORAL
Refills: 0 | Status: COMPLETED | COMMUNITY
End: 2023-11-06

## 2023-11-06 RX ORDER — SULFAMETHOXAZOLE AND TRIMETHOPRIM 800; 160 MG/1; MG/1
800-160 TABLET ORAL
Qty: 10 | Refills: 0 | Status: ACTIVE | COMMUNITY
Start: 2023-11-06 | End: 1900-01-01

## 2023-11-14 ENCOUNTER — APPOINTMENT (OUTPATIENT)
Dept: UROLOGY | Facility: CLINIC | Age: 63
End: 2023-11-14
Payer: COMMERCIAL

## 2023-11-14 VITALS
WEIGHT: 166 LBS | HEART RATE: 77 BPM | SYSTOLIC BLOOD PRESSURE: 112 MMHG | DIASTOLIC BLOOD PRESSURE: 60 MMHG | BODY MASS INDEX: 32.59 KG/M2 | TEMPERATURE: 97.3 F | OXYGEN SATURATION: 98 % | HEIGHT: 60 IN

## 2023-11-14 DIAGNOSIS — R31.29 OTHER MICROSCOPIC HEMATURIA: ICD-10-CM

## 2023-11-14 PROCEDURE — 52000 CYSTOURETHROSCOPY: CPT

## 2023-11-14 PROCEDURE — 99213 OFFICE O/P EST LOW 20 MIN: CPT | Mod: 25

## 2023-11-21 ENCOUNTER — APPOINTMENT (OUTPATIENT)
Dept: CT IMAGING | Facility: CLINIC | Age: 63
End: 2023-11-21
Payer: COMMERCIAL

## 2023-11-21 PROCEDURE — 71250 CT THORAX DX C-: CPT

## 2023-11-29 ENCOUNTER — APPOINTMENT (OUTPATIENT)
Dept: HEMATOLOGY ONCOLOGY | Facility: CLINIC | Age: 63
End: 2023-11-29
Payer: COMMERCIAL

## 2023-11-29 ENCOUNTER — APPOINTMENT (OUTPATIENT)
Dept: HEMATOLOGY ONCOLOGY | Facility: CLINIC | Age: 63
End: 2023-11-29

## 2023-11-29 DIAGNOSIS — I42.9 CARDIOMYOPATHY, UNSPECIFIED: ICD-10-CM

## 2023-11-29 DIAGNOSIS — E03.9 HYPOTHYROIDISM, UNSPECIFIED: ICD-10-CM

## 2023-11-29 PROCEDURE — 99214 OFFICE O/P EST MOD 30 MIN: CPT | Mod: 95

## 2023-12-04 ENCOUNTER — APPOINTMENT (OUTPATIENT)
Dept: THORACIC SURGERY | Facility: CLINIC | Age: 63
End: 2023-12-04
Payer: COMMERCIAL

## 2023-12-04 PROBLEM — J93.9 PNEUMOTHORAX, LEFT: Status: ACTIVE | Noted: 2023-09-18

## 2023-12-05 ENCOUNTER — NON-APPOINTMENT (OUTPATIENT)
Age: 63
End: 2023-12-05

## 2023-12-06 ENCOUNTER — RESULT REVIEW (OUTPATIENT)
Age: 63
End: 2023-12-06

## 2023-12-06 ENCOUNTER — APPOINTMENT (OUTPATIENT)
Dept: INFUSION THERAPY | Facility: HOSPITAL | Age: 63
End: 2023-12-06

## 2023-12-06 LAB
ALBUMIN SERPL ELPH-MCNC: 4.2 G/DL — SIGNIFICANT CHANGE UP (ref 3.3–5)
ALBUMIN SERPL ELPH-MCNC: 4.2 G/DL — SIGNIFICANT CHANGE UP (ref 3.3–5)
ALP SERPL-CCNC: 116 U/L — SIGNIFICANT CHANGE UP (ref 40–120)
ALP SERPL-CCNC: 116 U/L — SIGNIFICANT CHANGE UP (ref 40–120)
ALT FLD-CCNC: 58 U/L — HIGH (ref 10–45)
ALT FLD-CCNC: 58 U/L — HIGH (ref 10–45)
ANION GAP SERPL CALC-SCNC: 10 MMOL/L — SIGNIFICANT CHANGE UP (ref 5–17)
ANION GAP SERPL CALC-SCNC: 10 MMOL/L — SIGNIFICANT CHANGE UP (ref 5–17)
AST SERPL-CCNC: 39 U/L — SIGNIFICANT CHANGE UP (ref 10–40)
AST SERPL-CCNC: 39 U/L — SIGNIFICANT CHANGE UP (ref 10–40)
BASOPHILS # BLD AUTO: 0.12 K/UL — SIGNIFICANT CHANGE UP (ref 0–0.2)
BASOPHILS # BLD AUTO: 0.12 K/UL — SIGNIFICANT CHANGE UP (ref 0–0.2)
BASOPHILS NFR BLD AUTO: 1.1 % — SIGNIFICANT CHANGE UP (ref 0–2)
BASOPHILS NFR BLD AUTO: 1.1 % — SIGNIFICANT CHANGE UP (ref 0–2)
BILIRUB SERPL-MCNC: 0.4 MG/DL — SIGNIFICANT CHANGE UP (ref 0.2–1.2)
BILIRUB SERPL-MCNC: 0.4 MG/DL — SIGNIFICANT CHANGE UP (ref 0.2–1.2)
BUN SERPL-MCNC: 8 MG/DL — SIGNIFICANT CHANGE UP (ref 7–23)
BUN SERPL-MCNC: 8 MG/DL — SIGNIFICANT CHANGE UP (ref 7–23)
CALCIUM SERPL-MCNC: 10.2 MG/DL — SIGNIFICANT CHANGE UP (ref 8.4–10.5)
CALCIUM SERPL-MCNC: 10.2 MG/DL — SIGNIFICANT CHANGE UP (ref 8.4–10.5)
CHLORIDE SERPL-SCNC: 102 MMOL/L — SIGNIFICANT CHANGE UP (ref 96–108)
CHLORIDE SERPL-SCNC: 102 MMOL/L — SIGNIFICANT CHANGE UP (ref 96–108)
CO2 SERPL-SCNC: 28 MMOL/L — SIGNIFICANT CHANGE UP (ref 22–31)
CO2 SERPL-SCNC: 28 MMOL/L — SIGNIFICANT CHANGE UP (ref 22–31)
CREAT SERPL-MCNC: 0.6 MG/DL — SIGNIFICANT CHANGE UP (ref 0.5–1.3)
CREAT SERPL-MCNC: 0.6 MG/DL — SIGNIFICANT CHANGE UP (ref 0.5–1.3)
EGFR: 102 ML/MIN/1.73M2 — SIGNIFICANT CHANGE UP
EGFR: 102 ML/MIN/1.73M2 — SIGNIFICANT CHANGE UP
EOSINOPHIL # BLD AUTO: 0.6 K/UL — HIGH (ref 0–0.5)
EOSINOPHIL # BLD AUTO: 0.6 K/UL — HIGH (ref 0–0.5)
EOSINOPHIL NFR BLD AUTO: 5.4 % — SIGNIFICANT CHANGE UP (ref 0–6)
EOSINOPHIL NFR BLD AUTO: 5.4 % — SIGNIFICANT CHANGE UP (ref 0–6)
GLUCOSE SERPL-MCNC: 101 MG/DL — HIGH (ref 70–99)
GLUCOSE SERPL-MCNC: 101 MG/DL — HIGH (ref 70–99)
HCT VFR BLD CALC: 40.5 % — SIGNIFICANT CHANGE UP (ref 34.5–45)
HCT VFR BLD CALC: 40.5 % — SIGNIFICANT CHANGE UP (ref 34.5–45)
HGB BLD-MCNC: 13.7 G/DL — SIGNIFICANT CHANGE UP (ref 11.5–15.5)
HGB BLD-MCNC: 13.7 G/DL — SIGNIFICANT CHANGE UP (ref 11.5–15.5)
IMM GRANULOCYTES NFR BLD AUTO: 0.6 % — SIGNIFICANT CHANGE UP (ref 0–0.9)
IMM GRANULOCYTES NFR BLD AUTO: 0.6 % — SIGNIFICANT CHANGE UP (ref 0–0.9)
LYMPHOCYTES # BLD AUTO: 2.7 K/UL — SIGNIFICANT CHANGE UP (ref 1–3.3)
LYMPHOCYTES # BLD AUTO: 2.7 K/UL — SIGNIFICANT CHANGE UP (ref 1–3.3)
LYMPHOCYTES # BLD AUTO: 24.2 % — SIGNIFICANT CHANGE UP (ref 13–44)
LYMPHOCYTES # BLD AUTO: 24.2 % — SIGNIFICANT CHANGE UP (ref 13–44)
MCHC RBC-ENTMCNC: 31.2 PG — SIGNIFICANT CHANGE UP (ref 27–34)
MCHC RBC-ENTMCNC: 31.2 PG — SIGNIFICANT CHANGE UP (ref 27–34)
MCHC RBC-ENTMCNC: 33.8 G/DL — SIGNIFICANT CHANGE UP (ref 32–36)
MCHC RBC-ENTMCNC: 33.8 G/DL — SIGNIFICANT CHANGE UP (ref 32–36)
MCV RBC AUTO: 92.3 FL — SIGNIFICANT CHANGE UP (ref 80–100)
MCV RBC AUTO: 92.3 FL — SIGNIFICANT CHANGE UP (ref 80–100)
MONOCYTES # BLD AUTO: 1.15 K/UL — HIGH (ref 0–0.9)
MONOCYTES # BLD AUTO: 1.15 K/UL — HIGH (ref 0–0.9)
MONOCYTES NFR BLD AUTO: 10.3 % — SIGNIFICANT CHANGE UP (ref 2–14)
MONOCYTES NFR BLD AUTO: 10.3 % — SIGNIFICANT CHANGE UP (ref 2–14)
NEUTROPHILS # BLD AUTO: 6.54 K/UL — SIGNIFICANT CHANGE UP (ref 1.8–7.4)
NEUTROPHILS # BLD AUTO: 6.54 K/UL — SIGNIFICANT CHANGE UP (ref 1.8–7.4)
NEUTROPHILS NFR BLD AUTO: 58.4 % — SIGNIFICANT CHANGE UP (ref 43–77)
NEUTROPHILS NFR BLD AUTO: 58.4 % — SIGNIFICANT CHANGE UP (ref 43–77)
NRBC # BLD: 0 /100 WBCS — SIGNIFICANT CHANGE UP (ref 0–0)
NRBC # BLD: 0 /100 WBCS — SIGNIFICANT CHANGE UP (ref 0–0)
PLATELET # BLD AUTO: 398 K/UL — SIGNIFICANT CHANGE UP (ref 150–400)
PLATELET # BLD AUTO: 398 K/UL — SIGNIFICANT CHANGE UP (ref 150–400)
POTASSIUM SERPL-MCNC: 4.3 MMOL/L — SIGNIFICANT CHANGE UP (ref 3.5–5.3)
POTASSIUM SERPL-MCNC: 4.3 MMOL/L — SIGNIFICANT CHANGE UP (ref 3.5–5.3)
POTASSIUM SERPL-SCNC: 4.3 MMOL/L — SIGNIFICANT CHANGE UP (ref 3.5–5.3)
POTASSIUM SERPL-SCNC: 4.3 MMOL/L — SIGNIFICANT CHANGE UP (ref 3.5–5.3)
PROT SERPL-MCNC: 8 G/DL — SIGNIFICANT CHANGE UP (ref 6–8.3)
PROT SERPL-MCNC: 8 G/DL — SIGNIFICANT CHANGE UP (ref 6–8.3)
RBC # BLD: 4.39 M/UL — SIGNIFICANT CHANGE UP (ref 3.8–5.2)
RBC # BLD: 4.39 M/UL — SIGNIFICANT CHANGE UP (ref 3.8–5.2)
RBC # FLD: 12.8 % — SIGNIFICANT CHANGE UP (ref 10.3–14.5)
RBC # FLD: 12.8 % — SIGNIFICANT CHANGE UP (ref 10.3–14.5)
SODIUM SERPL-SCNC: 140 MMOL/L — SIGNIFICANT CHANGE UP (ref 135–145)
SODIUM SERPL-SCNC: 140 MMOL/L — SIGNIFICANT CHANGE UP (ref 135–145)
T4 FREE SERPL-MCNC: 2 NG/DL — HIGH (ref 0.9–1.8)
T4 FREE SERPL-MCNC: 2 NG/DL — HIGH (ref 0.9–1.8)
TSH SERPL-MCNC: 0.13 UIU/ML — LOW (ref 0.27–4.2)
TSH SERPL-MCNC: 0.13 UIU/ML — LOW (ref 0.27–4.2)
WBC # BLD: 11.18 K/UL — HIGH (ref 3.8–10.5)
WBC # BLD: 11.18 K/UL — HIGH (ref 3.8–10.5)
WBC # FLD AUTO: 11.18 K/UL — HIGH (ref 3.8–10.5)
WBC # FLD AUTO: 11.18 K/UL — HIGH (ref 3.8–10.5)

## 2023-12-11 ENCOUNTER — NON-APPOINTMENT (OUTPATIENT)
Age: 63
End: 2023-12-11

## 2023-12-11 ENCOUNTER — APPOINTMENT (OUTPATIENT)
Dept: THORACIC SURGERY | Facility: CLINIC | Age: 63
End: 2023-12-11
Payer: COMMERCIAL

## 2023-12-11 VITALS
HEIGHT: 60 IN | HEART RATE: 87 BPM | SYSTOLIC BLOOD PRESSURE: 127 MMHG | BODY MASS INDEX: 32.39 KG/M2 | DIASTOLIC BLOOD PRESSURE: 77 MMHG | OXYGEN SATURATION: 93 % | WEIGHT: 165 LBS | RESPIRATION RATE: 16 BRPM

## 2023-12-11 DIAGNOSIS — J93.9 PNEUMOTHORAX, UNSPECIFIED: ICD-10-CM

## 2023-12-11 PROCEDURE — 99213 OFFICE O/P EST LOW 20 MIN: CPT

## 2023-12-12 ENCOUNTER — APPOINTMENT (OUTPATIENT)
Dept: UROLOGY | Facility: CLINIC | Age: 63
End: 2023-12-12
Payer: COMMERCIAL

## 2023-12-12 VITALS
HEIGHT: 60 IN | SYSTOLIC BLOOD PRESSURE: 111 MMHG | TEMPERATURE: 97.1 F | OXYGEN SATURATION: 96 % | BODY MASS INDEX: 32.39 KG/M2 | DIASTOLIC BLOOD PRESSURE: 62 MMHG | HEART RATE: 61 BPM | WEIGHT: 165 LBS

## 2023-12-12 PROCEDURE — 52000 CYSTOURETHROSCOPY: CPT

## 2023-12-20 ENCOUNTER — APPOINTMENT (OUTPATIENT)
Dept: UROLOGY | Facility: CLINIC | Age: 63
End: 2023-12-20
Payer: COMMERCIAL

## 2023-12-20 VITALS
DIASTOLIC BLOOD PRESSURE: 61 MMHG | SYSTOLIC BLOOD PRESSURE: 119 MMHG | WEIGHT: 165 LBS | OXYGEN SATURATION: 99 % | HEART RATE: 67 BPM | HEIGHT: 60 IN | BODY MASS INDEX: 32.39 KG/M2 | TEMPERATURE: 97.9 F

## 2023-12-20 DIAGNOSIS — D49.4 NEOPLASM OF UNSPECIFIED BEHAVIOR OF BLADDER: ICD-10-CM

## 2023-12-20 PROCEDURE — 52224Z: CUSTOM

## 2024-01-02 LAB — CORE LAB BIOPSY: NORMAL

## 2024-01-03 ENCOUNTER — NON-APPOINTMENT (OUTPATIENT)
Age: 64
End: 2024-01-03

## 2024-01-11 ENCOUNTER — OUTPATIENT (OUTPATIENT)
Dept: OUTPATIENT SERVICES | Facility: HOSPITAL | Age: 64
LOS: 1 days | Discharge: HOME | End: 2024-01-11

## 2024-01-11 DIAGNOSIS — C34.90 MALIGNANT NEOPLASM OF UNSPECIFIED PART OF UNSPECIFIED BRONCHUS OR LUNG: ICD-10-CM

## 2024-01-11 DIAGNOSIS — Z85.118 PERSONAL HISTORY OF OTHER MALIGNANT NEOPLASM OF BRONCHUS AND LUNG: Chronic | ICD-10-CM

## 2024-01-11 DIAGNOSIS — O03.9 COMPLETE OR UNSPECIFIED SPONTANEOUS ABORTION WITHOUT COMPLICATION: Chronic | ICD-10-CM

## 2024-01-11 DIAGNOSIS — Z98.890 OTHER SPECIFIED POSTPROCEDURAL STATES: Chronic | ICD-10-CM

## 2024-01-17 ENCOUNTER — RESULT REVIEW (OUTPATIENT)
Age: 64
End: 2024-01-17

## 2024-01-17 ENCOUNTER — APPOINTMENT (OUTPATIENT)
Dept: INFUSION THERAPY | Facility: HOSPITAL | Age: 64
End: 2024-01-17

## 2024-01-17 ENCOUNTER — NON-APPOINTMENT (OUTPATIENT)
Age: 64
End: 2024-01-17

## 2024-01-17 ENCOUNTER — APPOINTMENT (OUTPATIENT)
Dept: HEMATOLOGY ONCOLOGY | Facility: CLINIC | Age: 64
End: 2024-01-17
Payer: COMMERCIAL

## 2024-01-17 VITALS
TEMPERATURE: 96.6 F | OXYGEN SATURATION: 98 % | RESPIRATION RATE: 16 BRPM | SYSTOLIC BLOOD PRESSURE: 126 MMHG | HEART RATE: 73 BPM | DIASTOLIC BLOOD PRESSURE: 78 MMHG | BODY MASS INDEX: 33.45 KG/M2 | WEIGHT: 171.26 LBS

## 2024-01-17 DIAGNOSIS — Z51.12 ENCOUNTER FOR ANTINEOPLASTIC CHEMOTHERAPY: ICD-10-CM

## 2024-01-17 DIAGNOSIS — Z51.11 ENCOUNTER FOR ANTINEOPLASTIC CHEMOTHERAPY: ICD-10-CM

## 2024-01-17 LAB
ALBUMIN SERPL ELPH-MCNC: 4.1 G/DL — SIGNIFICANT CHANGE UP (ref 3.3–5)
ALP SERPL-CCNC: 81 U/L — SIGNIFICANT CHANGE UP (ref 40–120)
ALT FLD-CCNC: 16 U/L — SIGNIFICANT CHANGE UP (ref 10–45)
ANION GAP SERPL CALC-SCNC: 10 MMOL/L — SIGNIFICANT CHANGE UP (ref 5–17)
AST SERPL-CCNC: 22 U/L — SIGNIFICANT CHANGE UP (ref 10–40)
BASOPHILS # BLD AUTO: 0.08 K/UL — SIGNIFICANT CHANGE UP (ref 0–0.2)
BASOPHILS NFR BLD AUTO: 0.7 % — SIGNIFICANT CHANGE UP (ref 0–2)
BILIRUB SERPL-MCNC: 0.2 MG/DL — SIGNIFICANT CHANGE UP (ref 0.2–1.2)
BUN SERPL-MCNC: 7 MG/DL — SIGNIFICANT CHANGE UP (ref 7–23)
CALCIUM SERPL-MCNC: 9.8 MG/DL — SIGNIFICANT CHANGE UP (ref 8.4–10.5)
CHLORIDE SERPL-SCNC: 106 MMOL/L — SIGNIFICANT CHANGE UP (ref 96–108)
CK SERPL-CCNC: 45 U/L — SIGNIFICANT CHANGE UP (ref 25–170)
CO2 SERPL-SCNC: 26 MMOL/L — SIGNIFICANT CHANGE UP (ref 22–31)
CREAT SERPL-MCNC: 0.53 MG/DL — SIGNIFICANT CHANGE UP (ref 0.5–1.3)
EGFR: 105 ML/MIN/1.73M2 — SIGNIFICANT CHANGE UP
EOSINOPHIL # BLD AUTO: 0.75 K/UL — HIGH (ref 0–0.5)
EOSINOPHIL NFR BLD AUTO: 6.9 % — HIGH (ref 0–6)
GLUCOSE SERPL-MCNC: 100 MG/DL — HIGH (ref 70–99)
HCT VFR BLD CALC: 39.8 % — SIGNIFICANT CHANGE UP (ref 34.5–45)
HGB BLD-MCNC: 13.5 G/DL — SIGNIFICANT CHANGE UP (ref 11.5–15.5)
IMM GRANULOCYTES NFR BLD AUTO: 0.4 % — SIGNIFICANT CHANGE UP (ref 0–0.9)
LYMPHOCYTES # BLD AUTO: 2.33 K/UL — SIGNIFICANT CHANGE UP (ref 1–3.3)
LYMPHOCYTES # BLD AUTO: 21.3 % — SIGNIFICANT CHANGE UP (ref 13–44)
MCHC RBC-ENTMCNC: 30.8 PG — SIGNIFICANT CHANGE UP (ref 27–34)
MCHC RBC-ENTMCNC: 33.9 G/DL — SIGNIFICANT CHANGE UP (ref 32–36)
MCV RBC AUTO: 90.9 FL — SIGNIFICANT CHANGE UP (ref 80–100)
MONOCYTES # BLD AUTO: 0.9 K/UL — SIGNIFICANT CHANGE UP (ref 0–0.9)
MONOCYTES NFR BLD AUTO: 8.2 % — SIGNIFICANT CHANGE UP (ref 2–14)
NEUTROPHILS # BLD AUTO: 6.82 K/UL — SIGNIFICANT CHANGE UP (ref 1.8–7.4)
NEUTROPHILS NFR BLD AUTO: 62.5 % — SIGNIFICANT CHANGE UP (ref 43–77)
NRBC # BLD: 0 /100 WBCS — SIGNIFICANT CHANGE UP (ref 0–0)
PLATELET # BLD AUTO: 287 K/UL — SIGNIFICANT CHANGE UP (ref 150–400)
POTASSIUM SERPL-MCNC: 4.2 MMOL/L — SIGNIFICANT CHANGE UP (ref 3.5–5.3)
POTASSIUM SERPL-SCNC: 4.2 MMOL/L — SIGNIFICANT CHANGE UP (ref 3.5–5.3)
PROT SERPL-MCNC: 7.3 G/DL — SIGNIFICANT CHANGE UP (ref 6–8.3)
RBC # BLD: 4.38 M/UL — SIGNIFICANT CHANGE UP (ref 3.8–5.2)
RBC # FLD: 11.9 % — SIGNIFICANT CHANGE UP (ref 10.3–14.5)
SODIUM SERPL-SCNC: 141 MMOL/L — SIGNIFICANT CHANGE UP (ref 135–145)
T4 FREE SERPL-MCNC: 1.8 NG/DL — SIGNIFICANT CHANGE UP (ref 0.9–1.8)
TSH SERPL-MCNC: 0.03 UIU/ML — LOW (ref 0.27–4.2)
WBC # BLD: 10.92 K/UL — HIGH (ref 3.8–10.5)
WBC # FLD AUTO: 10.92 K/UL — HIGH (ref 3.8–10.5)

## 2024-01-17 PROCEDURE — 99214 OFFICE O/P EST MOD 30 MIN: CPT

## 2024-01-18 DIAGNOSIS — Z51.11 ENCOUNTER FOR ANTINEOPLASTIC CHEMOTHERAPY: ICD-10-CM

## 2024-01-18 NOTE — ASSESSMENT
[FreeTextEntry1] : Originally stage III NSCLC with adenocarcinoma histology s/p sequential chemo/RT completed June 2013. Received SBRT to an enlarging MAICOL nodule in March 2016. Surveillance CT scan in March 2019 with enlarging MAICOL nodule which was hypermetabolic on PET/CT with concern for LN involvement. Underwent Bronch with EBUS biopsy on 4/25/19 with a Level 7 LN biopsy confirming adenocarcinoma consistent with lung primary. Tumor tested PDL1 negative, tested TMB-High and molecular testing is negative for actionable mutations (TP53, among others). Started systemic therapy with Carbo/Pem/Pembro in late May 2019. Developed Carboplatin infusion reaction during C2 which was then discontinued. Treated with Pem/Pembro going forward. Achieved WI after C3. Treated with maintenance Pem/Pembro with sustained response. Altered to q6 week administration due to tolerability issues in July 2020 at patient's insistence. Hospitalized September 2023 for hydropneumothorax secondary to bleb/emphysema status post chest tube placement. She was treated for community-acquired pneumonia with IV antibiotics. She was found to have cardiomyopathy with a decreased EF.  Cardiac Cath Sept 2023 with non-obstructive coronary disease and improved EF.  Repeat Echo October 2023 with normalization of EF.  Patient felt to have a transient stress-induced CM; evaluated by Cardio-Onc with clearance to resume ICI-therapy.   Restaging CT Chest Nov 2023 with sustained response.   Resumed single agent immunotherapy with Pembrolizumab in Dec 2023; the Pemetrexed chemotherapy was discontinued due to side effects.   Recommend: - Continue Pembrolizumab 400mg q6 weeks.  -Repeat labs today.  - Myalgias: Unclear etiology; possibly secondary to statin?  Follow-up with PCP/cardiologist.  Check CK with labs today.  Can utilize as needed NSAIDs in the short-term for symptoms.  Consider PMR evaluation; discussed possibility of seeing PMR for her musculoskeletal symptoms. - Continue Cardiology f/u and management s/p stress-induced cardiomyopathy.   - Continue f/u with pulmonary for management of COPD/emphysema. - Follow-up with thoracic surgery status post hydropneumothorax secondary to emphysematous bleb.  Continued monitoring was recommended with consideration for surgery if pneumothorax recurs.   - On levothyroxine replacement for hypothyroidism; dosing adjusted by PCP.  Monitor TFT's.   - F/U prior to subsequent cycle or sooner should problems arise.  - Checkout form provided to patient with instructions for scheduling appointments.  Patient wanted her next appointment to be 7 weeks away due to a family event.  Discussed obtaining a restaging scan prior to her next cycle as she would be due for restaging; she endorsed understanding.

## 2024-01-18 NOTE — HISTORY OF PRESENT ILLNESS
[Disease: _____________________] : Disease: [unfilled] [de-identified] : 55F who was diagnosed with lung cancer in 2012.  She had been having recurrent URI's with wheezing.  CXR showed a nodule and she therefore had a CT scan and PET/CT scan revealing a 1.3cm left apical soft tissue tumor with hypermetabolism in left hilum, left AP window and right azygoesophageal recess.  Patient underwent EBUS with biopys of left paratracheal LN with pathology consistent with adenocarcinoma of lung primary.  She was considered to have Stage IIIA/B disease (possibly N3 disease based on right sided findings).  She was treated with sequential chemo/RT with Cisplatin and Pemetrexed chemo begun in January 2013 and RT completed in June 2013.  She was followed with observation.  Underwent SBRT to MAICOL nodule in March 2016.  Surveillance CT chest on 3/11/19 revealed an enlarging MAICOL nodule.  She was referred back to Rad-Onc.  PET/CT was obtained in late March 2019 revealing this nodule to be hypermetabolic with concern for a lower hilar LN involvement.  She is now s/p EBUS on 4/25/19 with biopsy of a Level 7 LN that is positive for metastatic adenocarcinoma consistent with lung primary.  Tumor tested PDL1 negative and contains no actionable mutations.  Started systemic therapy with Carbo/Pem/Pembro in late May 2019.  Developed Carboplatin hypersensitivity reaction during C2 and Carbo was discontinued.  Treated with Pemetrexed + Pembrolizumab going forward.  Achieved IL.  Altered to q6 week administration at patient's insistence due to tolerability issues in July 2020.   Patient is status post admission at [facility] 9/1 - 9/8/2023 after presenting with new onset dyspnea and chest pain.  She was admitted for hypoxia due to left-sided hydropneumothorax secondary to bleb/emphysema and underwent chest tube placement that was subsequently removed.  She was treated with IV antibiotics for CAP.  She was found to have decreased ejection fraction, previously 45%'s down to 20% during hospitalization.  CTA chest was negative for PE and demonstrated left-sided hydropneumothorax with chest tube in place, bilateral pleural effusions and advanced emphysema.  She was medically managed.  Treated with single agent pemetrexed chemotherapy in October 2023 pending cardiology clearance.  She follows with cardiologist Dr. Navin Lawson.  She underwent cardiac catheterization 9/20/23 that revealed non-osbstructive CAD and revealed mild improvement of EF.  Treated with single She is felt to have a stress-induced cardiomyopathy and possibly Takotsubo cardiomyopathy.  Patient was evaluated by Cardio-Oncology Dr. Pavon with clearance to resume ICI-therapy as this was felt to be unlikely a culprit of the cardiomyopathy.  Resumed single agent pembrolizumab immunotherapy in early December 2023; pemetrexed chemotherapy was discontinued given multiple side effects attributed to the chemotherapy agent. [de-identified] : Adenocarcinoma [de-identified] : Patient presents today prior to continued treatment with single agent pembrolizumab immunotherapy; her most recent treatment was in early December 2023. Patient complains of recent onset myalgias in her shoulders and thighs.  She stopped the atorvastatin recently and reports that symptoms improved.  She was started on a different statin and reports return of symptoms; she stopped that new medication.  She reports that symptoms were improved with taking 3 Advil pills. She reports quitting smoking and finds it very challenging to do so and reports that she may go back to smoking. Her Synthroid dose was adjusted based on her TFTs.

## 2024-01-18 NOTE — RESULTS/DATA
[FreeTextEntry1] : -CT Chest 3/11/19:  0.8 cm lobulated nodule in the apical segment of the left upper lobe has slightly increased in size when compared to previous exam.   -PET/CT 3/25/19:  Abnormal FDG-PET/CT scan.  1. Interval resolution of previously seen hypermetabolic left upper lung nodule with associated minimally FDG avid postradiation changes as compared to PET/CT from 10/20/2015, however with interval appearance of new hypermetabolic left upper lung nodule which is superiorly located. This is suspicious for malignancy, metastatic versus new lesion.  2. Interval increase in FDG avidity of previously seen left perihilar focus, with additional adjacent new foci. These are concerning for metastases.  3. Nonspecific diffuse esophageal hypermetabolism, not significantly changed, which may again reflect esophagitis. Please correlate clinically.  4. New mild diffuse hypermetabolism in the anterior oral cavity, difficult to delineate due to streak artifacts, probably physiologic salivary activity. Please correlate clinically.   -Level 7 Lymph Node EBUS guided FNA 4/25/19:  Positive for malignant cells.  Metastatic Adenocarcinoma consistent with lung primary.    -CT Chest 5/9/19:  Left upper lobe 8 mm nodule may be slightly larger than the prior study. Malignancy should be considered. Multiple other bilateral lung nodules measuring up to 5 mm are unchanged.. Patchy left upper lobe consolidation is similar compared to several prior studies.   -MRI Brain 5/13/19:  No evidence of brain metastases. Minimal small vessel white matter ischemic changes. No change since 12/19/2012.   -CT Chest 7/15/19:   1. The left upper lobe nodule has decreased in size and measures 5 mm.  2. The additional left upper lobe nodule and right upper lobe nodules are unchanged.  3. The left upper lobe opacities are unchanged since 5/9/2019.   -CT Chest 9/23/19:  1. Since 7/15/2019, the patchy left upper lobe opacities are unchanged.  2. Since 7/15/2019, 5 mm left upper lobe nodule is unchanged.  3. Since 9/10/2018, the additional bilateral upper lobe nodules are unchanged.  4. Emphysema   -CT Chest 11/29/19:  Left upper lobe previously described patchy opacity is unchanged since September 23, 2019. Interval resolution of a contiguous left upper lobe area of linear atelectasis adjacent to the anterior mediastinum. 5 mm right lower lobe nodular opacity new since September 23, 2019 may represent focus of impaction of a previously seen cyst.   -MRI Brain 1/14/20: No mass or abnormal intracranial enhancement to suggest metastatic disease.   -CT Chest 2/14/20:  Stable patchy opacity in the left upper lobe when compared to previous exam.  No significant change in the small ill-defined opacity in the superior segment of the right lower lobe when compared to previous exam.   -CT Chest 5/29/20:  Stable exam. No new or enlarging lung nodule.   -CT Chest 8/22/20:  8 mm right upper lobe nodule new since May 29, 2020 possibly due to opacification of a previously seen cyst with fluid. 3 month follow-up noncontrast chest CT is recommended for further evaluation given history of lung cancer. The other bilateral pulmonary nodules and the previously seen left upper lobe patchy opacity are unchanged.   -CT Chest 11/13/20:  Patchy opacity in the left upper lobe is unchanged when compared to previous exams.  Stable 0.8 cm solid nodule in the right upper lobe when compared to previous exam.  -CT Chest 2/8/21:  Previously described 7 mm right upper lobe pulmonary nodule demonstrate minimal if any interval decrease in size since November 13, 2020. Continued follow-up is recommended.  4 mm right upper lobe pulmonary nodule new since November 13, 2020. A 3 month follow-up noncontrast chest CT can be performed to ensure stability or resolution.  Previously described left upper lobe patchy opacity is unchanged.  -CT Chest 6/11/21: Irregular bandlike opacity within left upper lobe representing posttreatment changes is stable. Previously described 4 mm right upper lobe nodule (series 2 image 44) is stable. Another 7 mm right upper lobe nodule is resolved. No new or enlarging nodule.  -CT Chest 10/25/21:  No significant interval change. Stable irregular bandlike opacity within left upper lobe likely posttreatment changes. No new or enlarging nodule.  Emphysema-cystic lung disease.  -CT C/A/P 3/17/22:  Stable posttreatment changes within left upper lobe. No new or enlarging nodule.  No evidence of metastatic disease within abdomen or pelvis.  -CT Chest 9/2/22:  Ill-defined patchy opacity in the left upper lobe is unchanged when compared to previous exam.  Numerous irregular shaped cysts are noted within both lungs. They are unchanged when compared to previous exam.  -CT C/A/P 2/17/23:  Stable ill-defined patchy and nodular opacities in the left upper lobe, may be posttreatment related. No new disease in the chest.  No metastatic disease in the abdomen or pelvis.  -CT CAP 6/5/23:  Compared to the previous examination, no significant change is noted.  - Echo 9/2/2023: LVEF is 20%.  Moderate pulmonary hypertension.  No pericardial effusion.  - CT chest angiogram 9/3/2023: Negative for PE.  Left-sided hydropneumothorax.  Left-sided chest tube in place.  Bilateral pleural effusions.  Advanced emphysema.  -CT Chest 11/21/23:  No change since CT chest 6/5/2023:  Left upper lobe scarring and few small solid lung nodules.

## 2024-01-18 NOTE — PHYSICAL EXAM
[Restricted in physically strenuous activity but ambulatory and able to carry out work of a light or sedentary nature] : Status 1- Restricted in physically strenuous activity but ambulatory and able to carry out work of a light or sedentary nature, e.g., light house work, office work [Normal] : affect appropriate [de-identified] : No icterus [de-identified] : TAVIA  [de-identified] : No LAD  [de-identified] : Clear [de-identified] : S1 S2 [de-identified] : No edema  [de-identified] : Ambulatory

## 2024-02-06 ENCOUNTER — NON-APPOINTMENT (OUTPATIENT)
Age: 64
End: 2024-02-06

## 2024-02-11 NOTE — PROCEDURE
[FreeTextEntry1] : PFT   normal FEV1,  FVC and ratio with diminished flow at level of small airways normal lung volumes and diffusion  [facility] Farah [facility]

## 2024-02-12 ENCOUNTER — APPOINTMENT (OUTPATIENT)
Dept: PULMONOLOGY | Facility: CLINIC | Age: 64
End: 2024-02-12
Payer: COMMERCIAL

## 2024-02-12 VITALS
SYSTOLIC BLOOD PRESSURE: 108 MMHG | TEMPERATURE: 97.1 F | WEIGHT: 173 LBS | DIASTOLIC BLOOD PRESSURE: 74 MMHG | HEART RATE: 83 BPM | BODY MASS INDEX: 33.79 KG/M2 | OXYGEN SATURATION: 96 %

## 2024-02-12 DIAGNOSIS — R91.8 OTHER NONSPECIFIC ABNORMAL FINDING OF LUNG FIELD: ICD-10-CM

## 2024-02-12 DIAGNOSIS — I42.8 OTHER CARDIOMYOPATHIES: ICD-10-CM

## 2024-02-12 PROCEDURE — 99214 OFFICE O/P EST MOD 30 MIN: CPT

## 2024-02-12 RX ORDER — TIOTROPIUM BROMIDE INHALATION SPRAY 3.12 UG/1
2.5 SPRAY, METERED RESPIRATORY (INHALATION) DAILY
Qty: 3 | Refills: 1 | Status: ACTIVE | COMMUNITY
Start: 2023-10-02 | End: 1900-01-01

## 2024-02-12 RX ORDER — MONTELUKAST 10 MG/1
10 TABLET, FILM COATED ORAL
Qty: 1 | Refills: 1 | Status: ACTIVE | COMMUNITY

## 2024-02-12 NOTE — PHYSICAL EXAM
[No Acute Distress] : no acute distress [Well Developed] : well developed [Well Nourished] : well nourished [Normal Oropharynx] : normal oropharynx [I] : Mallampati Class: I [Normal Appearance] : normal appearance [No Neck Mass] : no neck mass [Supple] : supple [No Resp Distress] : no resp distress [No Abnormalities] : no abnormalities [Clear to Auscultation Bilaterally] : clear to auscultation bilaterally [Not Tender] : not tender [Benign] : benign [No Masses] : no masses [Soft] : soft [No Clubbing] : no clubbing [No Focal Deficits] : no focal deficits [Oriented x3] : oriented x3 [TextBox_68] : no wheeze, few airway squeaks

## 2024-02-12 NOTE — DISCUSSION/SUMMARY
[FreeTextEntry1] : 63 year old woman who has reurrent adenoCa  of lung on Keytuda an Alimta.  she has COPD.  She has develped cardiomyopathy and is on Entresto She has had a left sided pneumothorax treated with chest tube, at [facility] where she presented. Pneumothorax resolved.  She has cough and dyspnea on exertion .  She has no rales or pedal edema She has a few squeaks on exam, no wheeze.  Not using any inhaled bronchodilator because she feels well.  States she has trouble falling asleep.  PFT demonstrated obstruction at level of small airways  She had COVID-19 infection in 01/24  She subsequently developed nasal congestion and cough  Went to urgent care center and told CXR was  unremarkable .  She took mucinex and nasal spray and advised to use albuterol MDI   Se still has dry cough  She had a CT chest 11/21/23 that demonstrated stable 5 mm nodule.  She has been on Keytruda, remains stable, follows with Oncology, Dr Romo    There is severe emphysema  She has diminished EF and is taking Entresto  She is not using LAMA  or other inhaled bronchodilator  except for albuterol MDI   She does take montelukast  She states she has been sleeping better, but is using lorazepam and melatonon  PLAN she will start on LAMA daily and use regularly May use albuterol MDI as needed   Continue montelukast  Follow CT chest per oncology  Continues on Keytruda  Counselled to remain smoking free   Total time spent : 30 minutes Including: Preparation prior to visit - Reviewing prior record, results of tests and Consultation Reports as applicable Conducting an appropriate H & P during today's encounter Appropriate orders for tests, medications and procedures, as applicable Counseling patient  Note completion    Gene Farah MD

## 2024-02-12 NOTE — HISTORY OF PRESENT ILLNESS
[Former] : former [>= 20 pack years] : >= 20 pack years [Current] : current [Snoring] : snoring [TextBox_4] : 63  year old patient presents for evaluation of shortness of breath .  She has  history of lung cancer NSC (adeno)  initially diagnosed in 2012. in left apex.  She did not have surgery.  Treated with chemotherapy and RT  She has been treated with chemotherapy   In 2019 she developed new lung lesion consistent with lung cancer , with Level 7 LN involvement.  Treated with Pembrolizumab and Pemetrexed.    Recently hospitalized at [facility] with bilateral pleural effusions.  She is presumed to have cardiomyopathy  (EF 20%) (Taktatsubo ) She had developed a left  pneumothorax She has COPD, advanced centrilobular emphysema.   She is on Keytruda and Alimta.  Managed by Dr Dave Zelaya and Dr Dave Romo.  She has been a smoker.  She recently stopped.   She had coronary angiogram that did not demonstrate significant stenoses.  She is on Entresto and metoprolol  She is not on any inhalers.  She states she has dyspnea on exertion   Primary doctor is Dr Andrea   PSH:  c section   PMH:  copd lung cancer  CHF cardiomyopathy HLD  HTN  hypothyroid  vertigo  SH:   former smoker stopped 1 month ago  ETOH:  occasional   Occupation: retired, worked as    ALLERGY:  NKDA  allergic to penicillin levofloxacin   Reaction is throat itching tightness rash  environmental/seasonal allergy: no  Review of Systems:  no sinusitis, occasional  sinus infections, nasal obstruction   no arthritis no joint aches no joint swelling   no pneumonia  no lung cancer   no chest pain    no peptic ulcer or gastritis no GERD no abdominal pain no liver disease  no Diabetes  no bleeding  no DVT or PE  no kidney disease  no stroke no seizure        [YearQuit] : 2023 [Hypersomnolence] : denies hypersomnolence [Witnessed Apneas] : no witnessed apneas

## 2024-02-28 ENCOUNTER — APPOINTMENT (OUTPATIENT)
Dept: CT IMAGING | Facility: CLINIC | Age: 64
End: 2024-02-28
Payer: COMMERCIAL

## 2024-02-28 PROCEDURE — 71250 CT THORAX DX C-: CPT

## 2024-03-06 ENCOUNTER — NON-APPOINTMENT (OUTPATIENT)
Age: 64
End: 2024-03-06

## 2024-03-06 ENCOUNTER — RESULT REVIEW (OUTPATIENT)
Age: 64
End: 2024-03-06

## 2024-03-06 ENCOUNTER — APPOINTMENT (OUTPATIENT)
Dept: HEMATOLOGY ONCOLOGY | Facility: CLINIC | Age: 64
End: 2024-03-06
Payer: COMMERCIAL

## 2024-03-06 ENCOUNTER — APPOINTMENT (OUTPATIENT)
Dept: INFUSION THERAPY | Facility: HOSPITAL | Age: 64
End: 2024-03-06

## 2024-03-06 VITALS
HEART RATE: 73 BPM | OXYGEN SATURATION: 94 % | DIASTOLIC BLOOD PRESSURE: 73 MMHG | BODY MASS INDEX: 35.06 KG/M2 | WEIGHT: 178.57 LBS | SYSTOLIC BLOOD PRESSURE: 110 MMHG | RESPIRATION RATE: 16 BRPM | HEIGHT: 60 IN | TEMPERATURE: 97.7 F

## 2024-03-06 LAB
ALBUMIN SERPL ELPH-MCNC: 4 G/DL — SIGNIFICANT CHANGE UP (ref 3.3–5)
ALP SERPL-CCNC: 72 U/L — SIGNIFICANT CHANGE UP (ref 40–120)
ALT FLD-CCNC: 13 U/L — SIGNIFICANT CHANGE UP (ref 10–45)
ANION GAP SERPL CALC-SCNC: 10 MMOL/L — SIGNIFICANT CHANGE UP (ref 5–17)
AST SERPL-CCNC: 20 U/L — SIGNIFICANT CHANGE UP (ref 10–40)
BASOPHILS # BLD AUTO: 0.11 K/UL — SIGNIFICANT CHANGE UP (ref 0–0.2)
BASOPHILS NFR BLD AUTO: 1.2 % — SIGNIFICANT CHANGE UP (ref 0–2)
BILIRUB SERPL-MCNC: 0.2 MG/DL — SIGNIFICANT CHANGE UP (ref 0.2–1.2)
BUN SERPL-MCNC: 10 MG/DL — SIGNIFICANT CHANGE UP (ref 7–23)
CALCIUM SERPL-MCNC: 9.8 MG/DL — SIGNIFICANT CHANGE UP (ref 8.4–10.5)
CHLORIDE SERPL-SCNC: 104 MMOL/L — SIGNIFICANT CHANGE UP (ref 96–108)
CO2 SERPL-SCNC: 27 MMOL/L — SIGNIFICANT CHANGE UP (ref 22–31)
CREAT SERPL-MCNC: 0.71 MG/DL — SIGNIFICANT CHANGE UP (ref 0.5–1.3)
EGFR: 97 ML/MIN/1.73M2 — SIGNIFICANT CHANGE UP
EOSINOPHIL # BLD AUTO: 0.79 K/UL — HIGH (ref 0–0.5)
EOSINOPHIL NFR BLD AUTO: 8.4 % — HIGH (ref 0–6)
GLUCOSE SERPL-MCNC: 100 MG/DL — HIGH (ref 70–99)
HCT VFR BLD CALC: 40.7 % — SIGNIFICANT CHANGE UP (ref 34.5–45)
HGB BLD-MCNC: 13.5 G/DL — SIGNIFICANT CHANGE UP (ref 11.5–15.5)
IMM GRANULOCYTES NFR BLD AUTO: 0.2 % — SIGNIFICANT CHANGE UP (ref 0–0.9)
LYMPHOCYTES # BLD AUTO: 2.32 K/UL — SIGNIFICANT CHANGE UP (ref 1–3.3)
LYMPHOCYTES # BLD AUTO: 24.6 % — SIGNIFICANT CHANGE UP (ref 13–44)
MCHC RBC-ENTMCNC: 30.2 PG — SIGNIFICANT CHANGE UP (ref 27–34)
MCHC RBC-ENTMCNC: 33.2 G/DL — SIGNIFICANT CHANGE UP (ref 32–36)
MCV RBC AUTO: 91.1 FL — SIGNIFICANT CHANGE UP (ref 80–100)
MONOCYTES # BLD AUTO: 0.77 K/UL — SIGNIFICANT CHANGE UP (ref 0–0.9)
MONOCYTES NFR BLD AUTO: 8.1 % — SIGNIFICANT CHANGE UP (ref 2–14)
NEUTROPHILS # BLD AUTO: 5.44 K/UL — SIGNIFICANT CHANGE UP (ref 1.8–7.4)
NEUTROPHILS NFR BLD AUTO: 57.5 % — SIGNIFICANT CHANGE UP (ref 43–77)
NRBC # BLD: 0 /100 WBCS — SIGNIFICANT CHANGE UP (ref 0–0)
PLATELET # BLD AUTO: 279 K/UL — SIGNIFICANT CHANGE UP (ref 150–400)
POTASSIUM SERPL-MCNC: 4.3 MMOL/L — SIGNIFICANT CHANGE UP (ref 3.5–5.3)
POTASSIUM SERPL-SCNC: 4.3 MMOL/L — SIGNIFICANT CHANGE UP (ref 3.5–5.3)
PROT SERPL-MCNC: 7.3 G/DL — SIGNIFICANT CHANGE UP (ref 6–8.3)
RBC # BLD: 4.47 M/UL — SIGNIFICANT CHANGE UP (ref 3.8–5.2)
RBC # FLD: 12.3 % — SIGNIFICANT CHANGE UP (ref 10.3–14.5)
SODIUM SERPL-SCNC: 141 MMOL/L — SIGNIFICANT CHANGE UP (ref 135–145)
T4 FREE SERPL-MCNC: 1.6 NG/DL — SIGNIFICANT CHANGE UP (ref 0.9–1.8)
TSH SERPL-MCNC: 0.2 UIU/ML — LOW (ref 0.27–4.2)
WBC # BLD: 9.45 K/UL — SIGNIFICANT CHANGE UP (ref 3.8–10.5)
WBC # FLD AUTO: 9.45 K/UL — SIGNIFICANT CHANGE UP (ref 3.8–10.5)

## 2024-03-06 PROCEDURE — 99214 OFFICE O/P EST MOD 30 MIN: CPT

## 2024-03-06 PROCEDURE — G2211 COMPLEX E/M VISIT ADD ON: CPT

## 2024-03-08 NOTE — PHYSICAL EXAM
[Restricted in physically strenuous activity but ambulatory and able to carry out work of a light or sedentary nature] : Status 1- Restricted in physically strenuous activity but ambulatory and able to carry out work of a light or sedentary nature, e.g., light house work, office work [Normal] : affect appropriate [de-identified] : No icterus [de-identified] : TAVIA  [de-identified] : No LAD  [de-identified] : Clear [de-identified] : S1 S2 [de-identified] : Ambulatory [de-identified] : No edema

## 2024-03-08 NOTE — HISTORY OF PRESENT ILLNESS
[Disease: _____________________] : Disease: [unfilled] [de-identified] : 55F who was diagnosed with lung cancer in 2012.  She had been having recurrent URI's with wheezing.  CXR showed a nodule and she therefore had a CT scan and PET/CT scan revealing a 1.3cm left apical soft tissue tumor with hypermetabolism in left hilum, left AP window and right azygoesophageal recess.  Patient underwent EBUS with biopys of left paratracheal LN with pathology consistent with adenocarcinoma of lung primary.  She was considered to have Stage IIIA/B disease (possibly N3 disease based on right sided findings).  She was treated with sequential chemo/RT with Cisplatin and Pemetrexed chemo begun in January 2013 and RT completed in June 2013.  She was followed with observation.  Underwent SBRT to MAICOL nodule in March 2016.  Surveillance CT chest on 3/11/19 revealed an enlarging MAICOL nodule.  She was referred back to Rad-Onc.  PET/CT was obtained in late March 2019 revealing this nodule to be hypermetabolic with concern for a lower hilar LN involvement.  She is now s/p EBUS on 4/25/19 with biopsy of a Level 7 LN that is positive for metastatic adenocarcinoma consistent with lung primary.  Tumor tested PDL1 negative and contains no actionable mutations.  Started systemic therapy with Carbo/Pem/Pembro in late May 2019.  Developed Carboplatin hypersensitivity reaction during C2 and Carbo was discontinued.  Treated with Pemetrexed + Pembrolizumab going forward.  Achieved GA.  Altered to q6 week administration at patient's insistence due to tolerability issues in July 2020.   Patient is status post admission at [facility] 9/1 - 9/8/2023 after presenting with new onset dyspnea and chest pain.  She was admitted for hypoxia due to left-sided hydropneumothorax secondary to bleb/emphysema and underwent chest tube placement that was subsequently removed.  She was treated with IV antibiotics for CAP.  She was found to have decreased ejection fraction, previously 45%'s down to 20% during hospitalization.  CTA chest was negative for PE and demonstrated left-sided hydropneumothorax with chest tube in place, bilateral pleural effusions and advanced emphysema.  She was medically managed.  Treated with single agent pemetrexed chemotherapy in October 2023 pending cardiology clearance.  She follows with cardiologist Dr. Navin Lawson.  She underwent cardiac catheterization 9/20/23 that revealed non-osbstructive CAD and revealed mild improvement of EF.  Treated with single She is felt to have a stress-induced cardiomyopathy and possibly Takotsubo cardiomyopathy.  Patient was evaluated by Cardio-Oncology Dr. Pavon with clearance to resume ICI-therapy as this was felt to be unlikely a culprit of the cardiomyopathy.  Resumed single agent pembrolizumab immunotherapy in early December 2023; pemetrexed chemotherapy was discontinued given multiple side effects attributed to the chemotherapy agent. [de-identified] : Adenocarcinoma [de-identified] : Patient presents today prior to continued treatment with single agent pembrolizumab immunotherapy.  After last visit, patient reports tucker COVID infection roughly 2 days later.  She was put on Paxlovid by her PCP which she responded to but then experienced rebound of her symptoms.  She then took some leftover antibiotics that she had in her cabinet of her own volition which were not helpful.  She went to urgent care in early February and had an unremarkable CXR.  She reports being given Mucinex as well as a nasal spray.  She is now starting to feel improved. She saw cardiology to address left-sided CP with workup that has been unremarkable. She is starting to feel improved.  She has refrained from smoking but is very frustrated.  She reports that she is gaining weight.  She reports WOOD.  She reports feeling depressed.  She is on lorazepam at night prescribed by her PCP but still reports trouble sleeping.  Discussed increasing exercise/walking. Her daughter is expecting a baby and is due next month. She would only agree to schedule her next treatment appointment on 5/1 due to a family wedding.  Restaging CT this month with overall sustained response.

## 2024-03-08 NOTE — ASSESSMENT
[FreeTextEntry1] : Originally stage III NSCLC with adenocarcinoma histology s/p sequential chemo/RT completed June 2013. Received SBRT to an enlarging MAICOL nodule in March 2016. Surveillance CT scan in March 2019 with enlarging MAICOL nodule which was hypermetabolic on PET/CT with concern for LN involvement. Underwent Bronch with EBUS biopsy on 4/25/19 with a Level 7 LN biopsy confirming adenocarcinoma consistent with lung primary. Tumor tested PDL1 negative, tested TMB-High and molecular testing is negative for actionable mutations (TP53, among others). Started systemic therapy with Carbo/Pem/Pembro in late May 2019. Developed Carboplatin infusion reaction during C2 which was then discontinued. Treated with Pem/Pembro going forward. Achieved NM after C3. Treated with maintenance Pem/Pembro with sustained response. Altered to q6 week administration due to tolerability issues in July 2020 at patient's insistence. Hospitalized September 2023 for hydropneumothorax secondary to bleb/emphysema status post chest tube placement. She was treated for community-acquired pneumonia with IV antibiotics. She was found to have cardiomyopathy with a decreased EF.  Cardiac Cath Sept 2023 with non-obstructive coronary disease and improved EF.  Repeat Echo October 2023 with normalization of EF.  Patient felt to have a transient stress-induced CM; evaluated by Cardio-Onc with clearance to resume ICI-therapy.   Resumed single agent immunotherapy with Pembrolizumab in Dec 2023; the Pemetrexed chemotherapy was discontinued due to side effects.   Restaging CT Chest Feb 2024 with sustained response.   Recommend: - Continue Pembrolizumab 400mg q6 weeks.  -Repeat labs today.  - Continue Cardiology f/u and management s/p stress-induced cardiomyopathy.   - Continue f/u with pulmonary for management of COPD/emphysema.  WOOD:  encouraged increased walking/exercise.   -Encouraged her not to resume smoking  - Follow-up with thoracic surgery status post hydropneumothorax secondary to emphysematous bleb.  Continued monitoring was recommended with consideration for surgery if pneumothorax recurs.   - On levothyroxine replacement for hypothyroidism; dosing adjusted by PCP.  Monitor TFT's.   - F/U prior to subsequent cycle or sooner should problems arise.  Patient would only agree to next treatment in early May  - Checkout form provided to patient with instructions for scheduling appointments.  Plan on obtaining her next restaging scan in June.

## 2024-03-08 NOTE — RESULTS/DATA
[FreeTextEntry1] : -CT Chest 3/11/19:  0.8 cm lobulated nodule in the apical segment of the left upper lobe has slightly increased in size when compared to previous exam.   -PET/CT 3/25/19:  Abnormal FDG-PET/CT scan.  1. Interval resolution of previously seen hypermetabolic left upper lung nodule with associated minimally FDG avid postradiation changes as compared to PET/CT from 10/20/2015, however with interval appearance of new hypermetabolic left upper lung nodule which is superiorly located. This is suspicious for malignancy, metastatic versus new lesion.  2. Interval increase in FDG avidity of previously seen left perihilar focus, with additional adjacent new foci. These are concerning for metastases.  3. Nonspecific diffuse esophageal hypermetabolism, not significantly changed, which may again reflect esophagitis. Please correlate clinically.  4. New mild diffuse hypermetabolism in the anterior oral cavity, difficult to delineate due to streak artifacts, probably physiologic salivary activity. Please correlate clinically.   -Level 7 Lymph Node EBUS guided FNA 4/25/19:  Positive for malignant cells.  Metastatic Adenocarcinoma consistent with lung primary.    -CT Chest 5/9/19:  Left upper lobe 8 mm nodule may be slightly larger than the prior study. Malignancy should be considered. Multiple other bilateral lung nodules measuring up to 5 mm are unchanged.. Patchy left upper lobe consolidation is similar compared to several prior studies.   -MRI Brain 5/13/19:  No evidence of brain metastases. Minimal small vessel white matter ischemic changes. No change since 12/19/2012.   -CT Chest 7/15/19:   1. The left upper lobe nodule has decreased in size and measures 5 mm.  2. The additional left upper lobe nodule and right upper lobe nodules are unchanged.  3. The left upper lobe opacities are unchanged since 5/9/2019.   -CT Chest 9/23/19:  1. Since 7/15/2019, the patchy left upper lobe opacities are unchanged.  2. Since 7/15/2019, 5 mm left upper lobe nodule is unchanged.  3. Since 9/10/2018, the additional bilateral upper lobe nodules are unchanged.  4. Emphysema   -CT Chest 11/29/19:  Left upper lobe previously described patchy opacity is unchanged since September 23, 2019. Interval resolution of a contiguous left upper lobe area of linear atelectasis adjacent to the anterior mediastinum. 5 mm right lower lobe nodular opacity new since September 23, 2019 may represent focus of impaction of a previously seen cyst.   -MRI Brain 1/14/20: No mass or abnormal intracranial enhancement to suggest metastatic disease.   -CT Chest 2/14/20:  Stable patchy opacity in the left upper lobe when compared to previous exam.  No significant change in the small ill-defined opacity in the superior segment of the right lower lobe when compared to previous exam.   -CT Chest 5/29/20:  Stable exam. No new or enlarging lung nodule.   -CT Chest 8/22/20:  8 mm right upper lobe nodule new since May 29, 2020 possibly due to opacification of a previously seen cyst with fluid. 3 month follow-up noncontrast chest CT is recommended for further evaluation given history of lung cancer. The other bilateral pulmonary nodules and the previously seen left upper lobe patchy opacity are unchanged.   -CT Chest 11/13/20:  Patchy opacity in the left upper lobe is unchanged when compared to previous exams.  Stable 0.8 cm solid nodule in the right upper lobe when compared to previous exam.  -CT Chest 2/8/21:  Previously described 7 mm right upper lobe pulmonary nodule demonstrate minimal if any interval decrease in size since November 13, 2020. Continued follow-up is recommended.  4 mm right upper lobe pulmonary nodule new since November 13, 2020. A 3 month follow-up noncontrast chest CT can be performed to ensure stability or resolution.  Previously described left upper lobe patchy opacity is unchanged.  -CT Chest 6/11/21: Irregular bandlike opacity within left upper lobe representing posttreatment changes is stable. Previously described 4 mm right upper lobe nodule (series 2 image 44) is stable. Another 7 mm right upper lobe nodule is resolved. No new or enlarging nodule.  -CT Chest 10/25/21:  No significant interval change. Stable irregular bandlike opacity within left upper lobe likely posttreatment changes. No new or enlarging nodule.  Emphysema-cystic lung disease.  -CT C/A/P 3/17/22:  Stable posttreatment changes within left upper lobe. No new or enlarging nodule.  No evidence of metastatic disease within abdomen or pelvis.  -CT Chest 9/2/22:  Ill-defined patchy opacity in the left upper lobe is unchanged when compared to previous exam.  Numerous irregular shaped cysts are noted within both lungs. They are unchanged when compared to previous exam.  -CT C/A/P 2/17/23:  Stable ill-defined patchy and nodular opacities in the left upper lobe, may be posttreatment related. No new disease in the chest.  No metastatic disease in the abdomen or pelvis.  -CT CAP 6/5/23:  Compared to the previous examination, no significant change is noted.  - Echo 9/2/2023: LVEF is 20%.  Moderate pulmonary hypertension.  No pericardial effusion.  - CT chest angiogram 9/3/2023: Negative for PE.  Left-sided hydropneumothorax.  Left-sided chest tube in place.  Bilateral pleural effusions.  Advanced emphysema.  -CT Chest 11/21/23:  No change since CT chest 6/5/2023:  Left upper lobe scarring and few small solid lung nodules.  Images Reviewed/Interpreted:  -CT Chest 2/28/24:  No new disease in the chest.

## 2024-04-23 ENCOUNTER — OUTPATIENT (OUTPATIENT)
Dept: OUTPATIENT SERVICES | Facility: HOSPITAL | Age: 64
LOS: 1 days | Discharge: HOME | End: 2024-04-23

## 2024-04-23 DIAGNOSIS — Z98.890 OTHER SPECIFIED POSTPROCEDURAL STATES: Chronic | ICD-10-CM

## 2024-04-23 DIAGNOSIS — Z85.118 PERSONAL HISTORY OF OTHER MALIGNANT NEOPLASM OF BRONCHUS AND LUNG: Chronic | ICD-10-CM

## 2024-04-23 DIAGNOSIS — C34.90 MALIGNANT NEOPLASM OF UNSPECIFIED PART OF UNSPECIFIED BRONCHUS OR LUNG: ICD-10-CM

## 2024-04-23 DIAGNOSIS — O03.9 COMPLETE OR UNSPECIFIED SPONTANEOUS ABORTION WITHOUT COMPLICATION: Chronic | ICD-10-CM

## 2024-04-24 ENCOUNTER — NON-APPOINTMENT (OUTPATIENT)
Age: 64
End: 2024-04-24

## 2024-04-29 ENCOUNTER — NON-APPOINTMENT (OUTPATIENT)
Age: 64
End: 2024-04-29

## 2024-05-01 ENCOUNTER — APPOINTMENT (OUTPATIENT)
Dept: INFUSION THERAPY | Facility: HOSPITAL | Age: 64
End: 2024-05-01

## 2024-05-01 ENCOUNTER — APPOINTMENT (OUTPATIENT)
Dept: HEMATOLOGY ONCOLOGY | Facility: CLINIC | Age: 64
End: 2024-05-01
Payer: COMMERCIAL

## 2024-05-01 ENCOUNTER — NON-APPOINTMENT (OUTPATIENT)
Age: 64
End: 2024-05-01

## 2024-05-01 PROCEDURE — 99442: CPT

## 2024-05-03 NOTE — REASON FOR VISIT
[Home] : at home, [unfilled] , at the time of the visit. [Medical Office: ([facility])___] : at the medical office located in  [Verbal consent obtained from patient] : the patient, [unfilled] [Follow-Up Visit] : a follow-up [FreeTextEntry2] : NSCLC

## 2024-05-03 NOTE — PHYSICAL EXAM
[Restricted in physically strenuous activity but ambulatory and able to carry out work of a light or sedentary nature] : Status 1- Restricted in physically strenuous activity but ambulatory and able to carry out work of a light or sedentary nature, e.g., light house work, office work [Normal] : affect appropriate [de-identified] : No icterus [de-identified] : TAVIA  [de-identified] : No LAD  [de-identified] : Clear [de-identified] : S1 S2 [de-identified] : No edema  [de-identified] : Ambulatory

## 2024-05-03 NOTE — HISTORY OF PRESENT ILLNESS
[Disease: _____________________] : Disease: [unfilled] [de-identified] : 55F who was diagnosed with lung cancer in 2012.  She had been having recurrent URI's with wheezing.  CXR showed a nodule and she therefore had a CT scan and PET/CT scan revealing a 1.3cm left apical soft tissue tumor with hypermetabolism in left hilum, left AP window and right azygoesophageal recess.  Patient underwent EBUS with biopys of left paratracheal LN with pathology consistent with adenocarcinoma of lung primary.  She was considered to have Stage IIIA/B disease (possibly N3 disease based on right sided findings).  She was treated with sequential chemo/RT with Cisplatin and Pemetrexed chemo begun in January 2013 and RT completed in June 2013.  She was followed with observation.  Underwent SBRT to MAICOL nodule in March 2016.  Surveillance CT chest on 3/11/19 revealed an enlarging MAICOL nodule.  She was referred back to Rad-Onc.  PET/CT was obtained in late March 2019 revealing this nodule to be hypermetabolic with concern for a lower hilar LN involvement.  She is now s/p EBUS on 4/25/19 with biopsy of a Level 7 LN that is positive for metastatic adenocarcinoma consistent with lung primary.  Tumor tested PDL1 negative and contains no actionable mutations.  Started systemic therapy with Carbo/Pem/Pembro in late May 2019.  Developed Carboplatin hypersensitivity reaction during C2 and Carbo was discontinued.  Treated with Pemetrexed + Pembrolizumab going forward.  Achieved GA.  Altered to q6 week administration at patient's insistence due to tolerability issues in July 2020.   Patient is status post admission at [facility] 9/1 - 9/8/2023 after presenting with new onset dyspnea and chest pain.  She was admitted for hypoxia due to left-sided hydropneumothorax secondary to bleb/emphysema and underwent chest tube placement that was subsequently removed.  She was treated with IV antibiotics for CAP.  She was found to have decreased ejection fraction, previously 45%'s down to 20% during hospitalization.  CTA chest was negative for PE and demonstrated left-sided hydropneumothorax with chest tube in place, bilateral pleural effusions and advanced emphysema.  She was medically managed.  Treated with single agent pemetrexed chemotherapy in October 2023 pending cardiology clearance.  She follows with cardiologist Dr. Navin Lawson.  She underwent cardiac catheterization 9/20/23 that revealed non-osbstructive CAD and revealed mild improvement of EF.  Treated with single She is felt to have a stress-induced cardiomyopathy and possibly Takotsubo cardiomyopathy.  Patient was evaluated by Cardio-Oncology Dr. Pavon with clearance to resume ICI-therapy as this was felt to be unlikely a culprit of the cardiomyopathy.  Resumed single agent pembrolizumab immunotherapy in early December 2023; pemetrexed chemotherapy was discontinued given multiple side effects attributed to the chemotherapy agent. [de-identified] : Adenocarcinoma [de-identified] : Pt requested converting appointment to telephone visit rather than cancelling. She was diagnosed with flu last week for which she was treated with Tamiflu. Pt states she began to recover but then relapsed with fevers returning. She went to urgent care yesterday and was told she had pneumonia. She was started on a zpac. She states that she continues to have cough productive of green sputum but fevers have resolved., She feels very fatigued. She cancelled treatment   Most recent CT with overall sustained response.

## 2024-05-03 NOTE — ASSESSMENT
[FreeTextEntry1] : Originally stage III NSCLC with adenocarcinoma histology s/p sequential chemo/RT completed June 2013. Received SBRT to an enlarging MAICOL nodule in March 2016. Surveillance CT scan in March 2019 with enlarging MAICOL nodule which was hypermetabolic on PET/CT with concern for LN involvement. Underwent Bronch with EBUS biopsy on 4/25/19 with a Level 7 LN biopsy confirming adenocarcinoma consistent with lung primary. Tumor tested PDL1 negative, tested TMB-High and molecular testing is negative for actionable mutations (TP53, among others). Started systemic therapy with Carbo/Pem/Pembro in late May 2019. Developed Carboplatin infusion reaction during C2 which was then discontinued. Treated with Pem/Pembro going forward. Achieved GA after C3. Treated with maintenance Pem/Pembro with sustained response. Altered to q6 week administration due to tolerability issues in July 2020 at patient's insistence. Hospitalized September 2023 for hydropneumothorax secondary to bleb/emphysema status post chest tube placement. She was treated for community-acquired pneumonia with IV antibiotics. She was found to have cardiomyopathy with a decreased EF.  Cardiac Cath Sept 2023 with non-obstructive coronary disease and improved EF.  Repeat Echo October 2023 with normalization of EF.  Patient felt to have a transient stress-induced CM; evaluated by Cardio-Onc with clearance to resume ICI-therapy.   Resumed single agent immunotherapy with Pembrolizumab in Dec 2023; the Pemetrexed chemotherapy was discontinued due to side effects.   Restaging CT Chest Feb 2024 with sustained response.    Recommend: - Continue Pembrolizumab 400mg q6 weeks. Pt cancelled treatment today 2/2 pneumonia. her next treatment is scheduled for June 12 - Continue Cardiology f/u and management s/p stress-induced cardiomyopathy.   - Continue f/u with pulmonary for management of COPD/emphysema.  WOOD:  encouraged increased walking/exercise.   -Encouraged her not to resume smoking  - Follow-up with thoracic surgery status post hydropneumothorax secondary to emphysematous bleb.  Continued monitoring was recommended with consideration for surgery if pneumothorax recurs.   - On levothyroxine replacement for hypothyroidism; dosing adjusted by PCP.  Monitor TFT's.   - F/U prior to subsequent cycle or sooner should problems arise.   -Continue Zpac as prescribed for pneumonia.

## 2024-05-16 ENCOUNTER — APPOINTMENT (OUTPATIENT)
Dept: PULMONOLOGY | Facility: CLINIC | Age: 64
End: 2024-05-16
Payer: COMMERCIAL

## 2024-05-16 VITALS
DIASTOLIC BLOOD PRESSURE: 84 MMHG | TEMPERATURE: 97.2 F | RESPIRATION RATE: 16 BRPM | WEIGHT: 171 LBS | SYSTOLIC BLOOD PRESSURE: 140 MMHG | BODY MASS INDEX: 33.57 KG/M2 | OXYGEN SATURATION: 96 % | HEIGHT: 60 IN | HEART RATE: 87 BPM

## 2024-05-16 DIAGNOSIS — C34.90 MALIGNANT NEOPLASM OF UNSPECIFIED PART OF UNSPECIFIED BRONCHUS OR LUNG: ICD-10-CM

## 2024-05-16 DIAGNOSIS — R05.3 CHRONIC COUGH: ICD-10-CM

## 2024-05-16 DIAGNOSIS — Z01.818 ENCOUNTER FOR OTHER PREPROCEDURAL EXAMINATION: ICD-10-CM

## 2024-05-16 PROCEDURE — 99214 OFFICE O/P EST MOD 30 MIN: CPT

## 2024-05-21 ENCOUNTER — APPOINTMENT (OUTPATIENT)
Dept: PULMONOLOGY | Facility: CLINIC | Age: 64
End: 2024-05-21
Payer: COMMERCIAL

## 2024-05-21 DIAGNOSIS — J44.9 CHRONIC OBSTRUCTIVE PULMONARY DISEASE, UNSPECIFIED: ICD-10-CM

## 2024-05-21 PROCEDURE — 94060 EVALUATION OF WHEEZING: CPT

## 2024-05-21 PROCEDURE — 94727 GAS DIL/WSHOT DETER LNG VOL: CPT

## 2024-05-21 PROCEDURE — 94729 DIFFUSING CAPACITY: CPT

## 2024-05-22 VITALS — OXYGEN SATURATION: 88 %

## 2024-05-29 NOTE — DISCUSSION/SUMMARY
[FreeTextEntry1] : 63 year old woman who has reurrent adenoCa  of lung on Keytuda an Alimta.  she has COPD.  She has develped cardiomyopathy and is on Entresto She has had a left sided pneumothorax treated with chest tube, at [place] where she presented. Pneumothorax resolved.  She has cough and dyspnea on exertion .  She has no rales or pedal edema She has a few squeaks on exam, no wheeze.  Not using any inhaled bronchodilator because she feels well.  States she has trouble falling asleep.  PFT demonstrated obstruction at level of small airways  She had COVID-19 infection in 01/24  She subsequently developed nasal congestion and cough  Went to urgent care center and told CXR was  unremarkable .  She took mucinex and nasal spray and advised to use albuterol MDI    She had a CT chest 11/21/23 that demonstrated stable 5 mm nodule.  She has been on Keytruda, remains stable, follows with Oncology, Dr Romo    There is severe emphysema  She has diminished EF and is taking Entresto  She is not using LAMA  or other inhaled bronchodilator  except for albuterol MDI   She does take montelukast  She states she has been sleeping better, but is using lorazepam and melatonon  She remains on Keytruda  She is using inhaled bronchodilator only as needed  She has stable . dyspnea on exertion.  She will undergo cholecystectomy and needs optimization  She has started smoking a few cigarettes daily again  She is cautioned on this  PLAN she will restart on LAMA daily and use regularly May use albuterol MDI as needed   Continue montelukast  Follow CT chest per oncology  Continues on Keytruda  Counselled to stop smoking  Offered alternative, recommended nicotine patch  I will offer final recommendations after PFT next week.  Not available at this office  Total time spent : 30 minutes Including: Preparation prior to visit - Reviewing prior record, results of tests and Consultation Reports as applicable Conducting an appropriate H & P during today's encounter Appropriate orders for tests, medications and procedures, as applicable Counseling patient  Note completion    Gene Farah MD

## 2024-05-29 NOTE — HISTORY OF PRESENT ILLNESS
[>= 20 pack years] : >= 20 pack years [Former] : former [Current] : current [Snoring] : snoring [TextBox_4] : 63  year old patient presents for evaluation of shortness of breath .  She has  history of lung cancer NSC (adeno)  initially diagnosed in 2012. in left apex.  She did not have surgery.  Treated with chemotherapy and RT  She has been treated with chemotherapy   In 2019 she developed new lung lesion consistent with lung cancer , with Level 7 LN involvement.  Treated with Pembrolizumab and Pemetrexed.    Recently hospitalized at [facility] with bilateral pleural effusions.  She is presumed to have cardiomyopathy  (EF 20%) (Taktatsubo ) She had developed a left  pneumothorax She has COPD, advanced centrilobular emphysema.   She is on Keytruda and Alimta.  Managed by Dr Dave Zelaya and Dr Dave Romo.  She has been a smoker.  She recently stopped.   She had coronary angiogram that did not demonstrate significant stenoses.  She is on Entresto and metoprolol  She is not on any inhalers.  She states she has dyspnea on exertion   Primary doctor is Dr Andrea   PSH:  c section   PMH:  copd lung cancer  CHF cardiomyopathy HLD  HTN  hypothyroid  vertigo  SH:   former smoker stopped 1 month ago  ETOH:  occasional   Occupation: retired, worked as    ALLERGY:  NKDA  allergic to penicillin levofloxacin   Reaction is throat itching tightness rash  environmental/seasonal allergy: no  Review of Systems:  no sinusitis, occasional  sinus infections, nasal obstruction   no arthritis no joint aches no joint swelling   no pneumonia  no lung cancer   no chest pain    no peptic ulcer or gastritis no GERD no abdominal pain no liver disease  no Diabetes  no bleeding  no DVT or PE  no kidney disease  no stroke no seizure        [YearQuit] : 2023 [Hypersomnolence] : denies hypersomnolence [Witnessed Apneas] : no witnessed apneas

## 2024-05-29 NOTE — PHYSICAL EXAM
[No Acute Distress] : no acute distress [Well Nourished] : well nourished [Well Developed] : well developed [Normal Oropharynx] : normal oropharynx [I] : Mallampati Class: I [Normal Appearance] : normal appearance [Supple] : supple [No Neck Mass] : no neck mass [No Resp Distress] : no resp distress [Clear to Auscultation Bilaterally] : clear to auscultation bilaterally [No Abnormalities] : no abnormalities [Benign] : benign [Not Tender] : not tender [No Masses] : no masses [Soft] : soft [No Clubbing] : no clubbing [No Focal Deficits] : no focal deficits [Oriented x3] : oriented x3 [TextBox_68] : no wheeze, few airway squeaks

## 2024-06-11 ENCOUNTER — NON-APPOINTMENT (OUTPATIENT)
Age: 64
End: 2024-06-11

## 2024-06-12 ENCOUNTER — RESULT REVIEW (OUTPATIENT)
Age: 64
End: 2024-06-12

## 2024-06-12 ENCOUNTER — APPOINTMENT (OUTPATIENT)
Dept: HEMATOLOGY ONCOLOGY | Facility: CLINIC | Age: 64
End: 2024-06-12
Payer: COMMERCIAL

## 2024-06-12 ENCOUNTER — APPOINTMENT (OUTPATIENT)
Dept: INFUSION THERAPY | Facility: HOSPITAL | Age: 64
End: 2024-06-12

## 2024-06-12 VITALS
OXYGEN SATURATION: 97 % | BODY MASS INDEX: 33.58 KG/M2 | HEART RATE: 88 BPM | WEIGHT: 171.96 LBS | TEMPERATURE: 96.8 F | SYSTOLIC BLOOD PRESSURE: 115 MMHG | DIASTOLIC BLOOD PRESSURE: 74 MMHG | RESPIRATION RATE: 17 BRPM

## 2024-06-12 DIAGNOSIS — C77.1 SECONDARY AND UNSPECIFIED MALIGNANT NEOPLASM OF INTRATHORACIC LYMPH NODES: ICD-10-CM

## 2024-06-12 DIAGNOSIS — Z51.12 ENCOUNTER FOR ANTINEOPLASTIC IMMUNOTHERAPY: ICD-10-CM

## 2024-06-12 DIAGNOSIS — C34.12 MALIGNANT NEOPLASM OF UPPER LOBE, LEFT BRONCHUS OR LUNG: ICD-10-CM

## 2024-06-12 LAB
BASOPHILS # BLD AUTO: 0.08 K/UL — SIGNIFICANT CHANGE UP (ref 0–0.2)
BASOPHILS NFR BLD AUTO: 0.7 % — SIGNIFICANT CHANGE UP (ref 0–2)
EOSINOPHIL # BLD AUTO: 0.8 K/UL — HIGH (ref 0–0.5)
EOSINOPHIL NFR BLD AUTO: 7.3 % — HIGH (ref 0–6)
HCT VFR BLD CALC: 40 % — SIGNIFICANT CHANGE UP (ref 34.5–45)
HGB BLD-MCNC: 13 G/DL — SIGNIFICANT CHANGE UP (ref 11.5–15.5)
IMM GRANULOCYTES NFR BLD AUTO: 0.5 % — SIGNIFICANT CHANGE UP (ref 0–0.9)
LYMPHOCYTES # BLD AUTO: 2.26 K/UL — SIGNIFICANT CHANGE UP (ref 1–3.3)
LYMPHOCYTES # BLD AUTO: 20.5 % — SIGNIFICANT CHANGE UP (ref 13–44)
MCHC RBC-ENTMCNC: 29.9 PG — SIGNIFICANT CHANGE UP (ref 27–34)
MCHC RBC-ENTMCNC: 32.5 G/DL — SIGNIFICANT CHANGE UP (ref 32–36)
MCV RBC AUTO: 92 FL — SIGNIFICANT CHANGE UP (ref 80–100)
MONOCYTES # BLD AUTO: 0.79 K/UL — SIGNIFICANT CHANGE UP (ref 0–0.9)
MONOCYTES NFR BLD AUTO: 7.2 % — SIGNIFICANT CHANGE UP (ref 2–14)
NEUTROPHILS # BLD AUTO: 7.04 K/UL — SIGNIFICANT CHANGE UP (ref 1.8–7.4)
NEUTROPHILS NFR BLD AUTO: 63.8 % — SIGNIFICANT CHANGE UP (ref 43–77)
NRBC # BLD: 0 /100 WBCS — SIGNIFICANT CHANGE UP (ref 0–0)
PLATELET # BLD AUTO: 332 K/UL — SIGNIFICANT CHANGE UP (ref 150–400)
RBC # BLD: 4.35 M/UL — SIGNIFICANT CHANGE UP (ref 3.8–5.2)
RBC # FLD: 13 % — SIGNIFICANT CHANGE UP (ref 10.3–14.5)
WBC # BLD: 11.02 K/UL — HIGH (ref 3.8–10.5)
WBC # FLD AUTO: 11.02 K/UL — HIGH (ref 3.8–10.5)

## 2024-06-12 PROCEDURE — G2211 COMPLEX E/M VISIT ADD ON: CPT | Mod: NC,1L

## 2024-06-12 PROCEDURE — 99214 OFFICE O/P EST MOD 30 MIN: CPT

## 2024-06-13 DIAGNOSIS — Z51.11 ENCOUNTER FOR ANTINEOPLASTIC CHEMOTHERAPY: ICD-10-CM

## 2024-06-13 DIAGNOSIS — R11.2 NAUSEA WITH VOMITING, UNSPECIFIED: ICD-10-CM

## 2024-06-13 LAB
ALBUMIN SERPL ELPH-MCNC: 4.1 G/DL — SIGNIFICANT CHANGE UP (ref 3.3–5)
ALP SERPL-CCNC: 63 U/L — SIGNIFICANT CHANGE UP (ref 40–120)
ALT FLD-CCNC: 12 U/L — SIGNIFICANT CHANGE UP (ref 10–45)
ANION GAP SERPL CALC-SCNC: 17 MMOL/L — SIGNIFICANT CHANGE UP (ref 5–17)
AST SERPL-CCNC: 19 U/L — SIGNIFICANT CHANGE UP (ref 10–40)
BILIRUB SERPL-MCNC: 0.2 MG/DL — SIGNIFICANT CHANGE UP (ref 0.2–1.2)
BUN SERPL-MCNC: 13 MG/DL — SIGNIFICANT CHANGE UP (ref 7–23)
CALCIUM SERPL-MCNC: 9.8 MG/DL — SIGNIFICANT CHANGE UP (ref 8.4–10.5)
CHLORIDE SERPL-SCNC: 106 MMOL/L — SIGNIFICANT CHANGE UP (ref 96–108)
CO2 SERPL-SCNC: 19 MMOL/L — LOW (ref 22–31)
CREAT SERPL-MCNC: 0.64 MG/DL — SIGNIFICANT CHANGE UP (ref 0.5–1.3)
EGFR: 100 ML/MIN/1.73M2 — SIGNIFICANT CHANGE UP
GLUCOSE SERPL-MCNC: 80 MG/DL — SIGNIFICANT CHANGE UP (ref 70–99)
POTASSIUM SERPL-MCNC: 5 MMOL/L — SIGNIFICANT CHANGE UP (ref 3.5–5.3)
POTASSIUM SERPL-SCNC: 5 MMOL/L — SIGNIFICANT CHANGE UP (ref 3.5–5.3)
PROT SERPL-MCNC: 7.7 G/DL — SIGNIFICANT CHANGE UP (ref 6–8.3)
SODIUM SERPL-SCNC: 141 MMOL/L — SIGNIFICANT CHANGE UP (ref 135–145)
T4 FREE SERPL-MCNC: 1.9 NG/DL — HIGH (ref 0.9–1.8)
TSH SERPL-MCNC: 0.07 UIU/ML — LOW (ref 0.27–4.2)

## 2024-06-18 NOTE — PHYSICAL EXAM
[Restricted in physically strenuous activity but ambulatory and able to carry out work of a light or sedentary nature] : Status 1- Restricted in physically strenuous activity but ambulatory and able to carry out work of a light or sedentary nature, e.g., light house work, office work [Normal] : affect appropriate [de-identified] : No icterus [de-identified] : TAVIA  [de-identified] : No LAD  [de-identified] : Clear [de-identified] : S1 S2 [de-identified] : No edema  [de-identified] : Soft NT [de-identified] : Ambulatory

## 2024-06-18 NOTE — HISTORY OF PRESENT ILLNESS
[Disease: _____________________] : Disease: [unfilled] [de-identified] : 55F who was diagnosed with lung cancer in 2012.  She had been having recurrent URI's with wheezing.  CXR showed a nodule and she therefore had a CT scan and PET/CT scan revealing a 1.3cm left apical soft tissue tumor with hypermetabolism in left hilum, left AP window and right azygoesophageal recess.  Patient underwent EBUS with biopys of left paratracheal LN with pathology consistent with adenocarcinoma of lung primary.  She was considered to have Stage IIIA/B disease (possibly N3 disease based on right sided findings).  She was treated with sequential chemo/RT with Cisplatin and Pemetrexed chemo begun in January 2013 and RT completed in June 2013.  She was followed with observation.  Underwent SBRT to MAICOL nodule in March 2016.  Surveillance CT chest on 3/11/19 revealed an enlarging MAICOL nodule.  She was referred back to Rad-Onc.  PET/CT was obtained in late March 2019 revealing this nodule to be hypermetabolic with concern for a lower hilar LN involvement.  She is now s/p EBUS on 4/25/19 with biopsy of a Level 7 LN that is positive for metastatic adenocarcinoma consistent with lung primary.  Tumor tested PDL1 negative and contains no actionable mutations.  Started systemic therapy with Carbo/Pem/Pembro in late May 2019.  Developed Carboplatin hypersensitivity reaction during C2 and Carbo was discontinued.  Treated with Pemetrexed + Pembrolizumab going forward.  Achieved [facility].  Altered to q6 week administration at patient's insistence due to tolerability issues in July 2020.   Patient is status post admission at [facility] 9/1 - 9/8/2023 after presenting with new onset dyspnea and chest pain.  She was admitted for hypoxia due to left-sided hydropneumothorax secondary to bleb/emphysema and underwent chest tube placement that was subsequently removed.  She was treated with IV antibiotics for CAP.  She was found to have decreased ejection fraction, previously 45%'s down to 20% during hospitalization.  CTA chest was negative for PE and demonstrated left-sided hydropneumothorax with chest tube in place, bilateral pleural effusions and advanced emphysema.  She was medically managed.  Treated with single agent pemetrexed chemotherapy in October 2023 pending cardiology clearance.  She follows with cardiologist Dr. Navin Lawson.  She underwent cardiac catheterization 9/20/23 that revealed non-osbstructive CAD and revealed mild improvement of EF.  Treated with single She is felt to have a stress-induced cardiomyopathy and possibly Takotsubo cardiomyopathy.  Patient was evaluated by Cardio-Oncology Dr. Pavon with clearance to resume ICI-therapy as this was felt to be unlikely a culprit of the cardiomyopathy.  Resumed single agent pembrolizumab immunotherapy in early December 2023; pemetrexed chemotherapy was discontinued given multiple side effects attributed to the chemotherapy agent. [de-identified] : Adenocarcinoma [de-identified] : Patient had severe flu in April s/p medical management.  Developed superimposed pna s/p Abx.   Underwent cholecystectomy in late May at outside facility.  Recovering well and prior abdominal discomfort resolved.   She did resume smoking.

## 2024-06-18 NOTE — RESULTS/DATA
[FreeTextEntry1] : -CT Chest 3/11/19:  0.8 cm lobulated nodule in the apical segment of the left upper lobe has slightly increased in size when compared to previous exam.   -PET/CT 3/25/19:  Abnormal FDG-PET/CT scan.  1. Interval resolution of previously seen hypermetabolic left upper lung nodule with associated minimally FDG avid postradiation changes as compared to PET/CT from 10/20/2015, however with interval appearance of new hypermetabolic left upper lung nodule which is superiorly located. This is suspicious for malignancy, metastatic versus new lesion.  2. Interval increase in FDG avidity of previously seen left perihilar focus, with additional adjacent new foci. These are concerning for metastases.  3. Nonspecific diffuse esophageal hypermetabolism, not significantly changed, which may again reflect esophagitis. Please correlate clinically.  4. New mild diffuse hypermetabolism in the anterior oral cavity, difficult to delineate due to streak artifacts, probably physiologic salivary activity. Please correlate clinically.   -Level 7 Lymph Node EBUS guided FNA 4/25/19:  Positive for malignant cells.  Metastatic Adenocarcinoma consistent with lung primary.    -CT Chest 5/9/19:  Left upper lobe 8 mm nodule may be slightly larger than the prior study. Malignancy should be considered. Multiple other bilateral lung nodules measuring up to 5 mm are unchanged.. Patchy left upper lobe consolidation is similar compared to several prior studies.   -MRI Brain 5/13/19:  No evidence of brain metastases. Minimal small vessel white matter ischemic changes. No change since 12/19/2012.   -CT Chest 7/15/19:   1. The left upper lobe nodule has decreased in size and measures 5 mm.  2. The additional left upper lobe nodule and right upper lobe nodules are unchanged.  3. The left upper lobe opacities are unchanged since 5/9/2019.   -CT Chest 9/23/19:  1. Since 7/15/2019, the patchy left upper lobe opacities are unchanged.  2. Since 7/15/2019, 5 mm left upper lobe nodule is unchanged.  3. Since 9/10/2018, the additional bilateral upper lobe nodules are unchanged.  4. Emphysema   -CT Chest 11/29/19:  Left upper lobe previously described patchy opacity is unchanged since September 23, 2019. Interval resolution of a contiguous left upper lobe area of linear atelectasis adjacent to the anterior mediastinum. 5 mm right lower lobe nodular opacity new since September 23, 2019 may represent focus of impaction of a previously seen cyst.   -MRI Brain 1/14/20: No mass or abnormal intracranial enhancement to suggest metastatic disease.   -CT Chest 2/14/20:  Stable patchy opacity in the left upper lobe when compared to previous exam.  No significant change in the small ill-defined opacity in the superior segment of the right lower lobe when compared to previous exam.   -CT Chest 5/29/20:  Stable exam. No new or enlarging lung nodule.   -CT Chest 8/22/20:  8 mm right upper lobe nodule new since May 29, 2020 possibly due to opacification of a previously seen cyst with fluid. 3 month follow-up noncontrast chest CT is recommended for further evaluation given history of lung cancer. The other bilateral pulmonary nodules and the previously seen left upper lobe patchy opacity are unchanged.   -CT Chest 11/13/20:  Patchy opacity in the left upper lobe is unchanged when compared to previous exams.  Stable 0.8 cm solid nodule in the right upper lobe when compared to previous exam.  -CT Chest 2/8/21:  Previously described 7 mm right upper lobe pulmonary nodule demonstrate minimal if any interval decrease in size since November 13, 2020. Continued follow-up is recommended.  4 mm right upper lobe pulmonary nodule new since November 13, 2020. A 3 month follow-up noncontrast chest CT can be performed to ensure stability or resolution.  Previously described left upper lobe patchy opacity is unchanged.  -CT Chest 6/11/21: Irregular bandlike opacity within left upper lobe representing posttreatment changes is stable. Previously described 4 mm right upper lobe nodule (series 2 image 44) is stable. Another 7 mm right upper lobe nodule is resolved. No new or enlarging nodule.  -CT Chest 10/25/21:  No significant interval change. Stable irregular bandlike opacity within left upper lobe likely posttreatment changes. No new or enlarging nodule.  Emphysema-cystic lung disease.  -CT C/A/P 3/17/22:  Stable posttreatment changes within left upper lobe. No new or enlarging nodule.  No evidence of metastatic disease within abdomen or pelvis.  -CT Chest 9/2/22:  Ill-defined patchy opacity in the left upper lobe is unchanged when compared to previous exam.  Numerous irregular shaped cysts are noted within both lungs. They are unchanged when compared to previous exam.  -CT C/A/P 2/17/23:  Stable ill-defined patchy and nodular opacities in the left upper lobe, may be posttreatment related. No new disease in the chest.  No metastatic disease in the abdomen or pelvis.  -CT CAP 6/5/23:  Compared to the previous examination, no significant change is noted.  - Echo 9/2/2023: LVEF is 20%.  Moderate pulmonary hypertension.  No pericardial effusion.  - CT chest angiogram 9/3/2023: Negative for PE.  Left-sided hydropneumothorax.  Left-sided chest tube in place.  Bilateral pleural effusions.  Advanced emphysema.  -CT Chest 11/21/23:  No change since CT chest 6/5/2023:  Left upper lobe scarring and few small solid lung nodules.  -CT Chest 2/28/24:  No new disease in the chest.

## 2024-06-18 NOTE — ASSESSMENT
[FreeTextEntry1] : Originally stage III NSCLC with adenocarcinoma histology s/p sequential chemo/RT completed June 2013. Received SBRT to an enlarging MAICOL nodule in March 2016. Surveillance CT scan in March 2019 with enlarging MAICOL nodule which was hypermetabolic on PET/CT with concern for LN involvement. Underwent Bronch with EBUS biopsy on 4/25/19 with a Level 7 LN biopsy confirming adenocarcinoma consistent with lung primary. Tumor tested PDL1 negative, tested TMB-High and molecular testing is negative for actionable mutations (TP53, among others). Started systemic therapy with Carbo/Pem/Pembro in late May 2019. Developed Carboplatin infusion reaction during C2 which was then discontinued. Treated with Pem/Pembro going forward. Achieved VT after C3. Treated with maintenance Pem/Pembro with sustained response. Altered to q6 week administration due to tolerability issues in July 2020 at patient's insistence. Hospitalized September 2023 for hydropneumothorax secondary to bleb/emphysema status post chest tube placement. She was treated for community-acquired pneumonia with IV antibiotics. She was found to have cardiomyopathy with a decreased EF.  Cardiac Cath Sept 2023 with non-obstructive coronary disease and improved EF.  Repeat Echo October 2023 with normalization of EF.  Patient felt to have a transient stress-induced CM; evaluated by Cardio-Onc with clearance to resume ICI-therapy.   Resumed single agent immunotherapy with Pembrolizumab in Dec 2023; the Pemetrexed chemotherapy was discontinued due to side effects.   Restaging CT Chest Feb 2024 with sustained response.   Had flu and pneumonia in April and cholecystectomy in May 2024.   Recommend: - Continue Pembrolizumab 400mg q6 weeks.  - Continue Cardiology f/u and management s/p stress-induced cardiomyopathy.   - Continue f/u with pulmonary for management of COPD/emphysema.   - Follow-up with thoracic surgery status post hydropneumothorax secondary to emphysematous bleb.  Continued monitoring was recommended with consideration for surgery if pneumothorax recurs.   - On levothyroxine replacement for hypothyroidism; dosing adjusted by PCP.  Monitor TFT's.   - F/U prior to subsequent cycle or sooner should problems arise.   -Restaging CT ordered for July prior to her next visit.  Checkout for provided for appt scheduling.

## 2024-07-17 ENCOUNTER — OUTPATIENT (OUTPATIENT)
Dept: OUTPATIENT SERVICES | Facility: HOSPITAL | Age: 64
LOS: 1 days | Discharge: HOME | End: 2024-07-17

## 2024-07-17 DIAGNOSIS — Z98.890 OTHER SPECIFIED POSTPROCEDURAL STATES: Chronic | ICD-10-CM

## 2024-07-17 DIAGNOSIS — C34.90 MALIGNANT NEOPLASM OF UNSPECIFIED PART OF UNSPECIFIED BRONCHUS OR LUNG: ICD-10-CM

## 2024-07-17 DIAGNOSIS — O03.9 COMPLETE OR UNSPECIFIED SPONTANEOUS ABORTION WITHOUT COMPLICATION: Chronic | ICD-10-CM

## 2024-07-17 DIAGNOSIS — Z85.118 PERSONAL HISTORY OF OTHER MALIGNANT NEOPLASM OF BRONCHUS AND LUNG: Chronic | ICD-10-CM

## 2024-07-19 ENCOUNTER — APPOINTMENT (OUTPATIENT)
Dept: CT IMAGING | Facility: CLINIC | Age: 64
End: 2024-07-19

## 2024-07-19 PROCEDURE — 71250 CT THORAX DX C-: CPT

## 2024-07-24 ENCOUNTER — RESULT REVIEW (OUTPATIENT)
Age: 64
End: 2024-07-24

## 2024-07-24 ENCOUNTER — NON-APPOINTMENT (OUTPATIENT)
Age: 64
End: 2024-07-24

## 2024-07-24 ENCOUNTER — APPOINTMENT (OUTPATIENT)
Dept: INFUSION THERAPY | Facility: HOSPITAL | Age: 64
End: 2024-07-24

## 2024-07-24 ENCOUNTER — APPOINTMENT (OUTPATIENT)
Dept: HEMATOLOGY ONCOLOGY | Facility: CLINIC | Age: 64
End: 2024-07-24
Payer: COMMERCIAL

## 2024-07-24 VITALS
OXYGEN SATURATION: 97 % | RESPIRATION RATE: 16 BRPM | BODY MASS INDEX: 33.15 KG/M2 | WEIGHT: 169.75 LBS | DIASTOLIC BLOOD PRESSURE: 75 MMHG | SYSTOLIC BLOOD PRESSURE: 113 MMHG | HEART RATE: 81 BPM | TEMPERATURE: 96.8 F

## 2024-07-24 DIAGNOSIS — C77.1 SECONDARY AND UNSPECIFIED MALIGNANT NEOPLASM OF INTRATHORACIC LYMPH NODES: ICD-10-CM

## 2024-07-24 DIAGNOSIS — Z51.12 ENCOUNTER FOR ANTINEOPLASTIC IMMUNOTHERAPY: ICD-10-CM

## 2024-07-24 DIAGNOSIS — C34.12 MALIGNANT NEOPLASM OF UPPER LOBE, LEFT BRONCHUS OR LUNG: ICD-10-CM

## 2024-07-24 DIAGNOSIS — R91.8 OTHER NONSPECIFIC ABNORMAL FINDING OF LUNG FIELD: ICD-10-CM

## 2024-07-24 LAB
ALBUMIN SERPL ELPH-MCNC: 4.1 G/DL — SIGNIFICANT CHANGE UP (ref 3.3–5)
ALP SERPL-CCNC: 73 U/L — SIGNIFICANT CHANGE UP (ref 40–120)
ALT FLD-CCNC: 13 U/L — SIGNIFICANT CHANGE UP (ref 10–45)
ANION GAP SERPL CALC-SCNC: 10 MMOL/L — SIGNIFICANT CHANGE UP (ref 5–17)
AST SERPL-CCNC: 27 U/L — SIGNIFICANT CHANGE UP (ref 10–40)
BASOPHILS # BLD AUTO: 0.1 K/UL — SIGNIFICANT CHANGE UP (ref 0–0.2)
BASOPHILS NFR BLD AUTO: 0.9 % — SIGNIFICANT CHANGE UP (ref 0–2)
BILIRUB SERPL-MCNC: 0.2 MG/DL — SIGNIFICANT CHANGE UP (ref 0.2–1.2)
BUN SERPL-MCNC: 5 MG/DL — LOW (ref 7–23)
CALCIUM SERPL-MCNC: 10.2 MG/DL — SIGNIFICANT CHANGE UP (ref 8.4–10.5)
CHLORIDE SERPL-SCNC: 107 MMOL/L — SIGNIFICANT CHANGE UP (ref 96–108)
CO2 SERPL-SCNC: 24 MMOL/L — SIGNIFICANT CHANGE UP (ref 22–31)
CREAT SERPL-MCNC: 0.65 MG/DL — SIGNIFICANT CHANGE UP (ref 0.5–1.3)
EGFR: 99 ML/MIN/1.73M2 — SIGNIFICANT CHANGE UP
EOSINOPHIL # BLD AUTO: 1.14 K/UL — HIGH (ref 0–0.5)
EOSINOPHIL NFR BLD AUTO: 10.8 % — HIGH (ref 0–6)
GLUCOSE SERPL-MCNC: 93 MG/DL — SIGNIFICANT CHANGE UP (ref 70–99)
HCT VFR BLD CALC: 41.8 % — SIGNIFICANT CHANGE UP (ref 34.5–45)
HGB BLD-MCNC: 13.5 G/DL — SIGNIFICANT CHANGE UP (ref 11.5–15.5)
IMM GRANULOCYTES NFR BLD AUTO: 0.2 % — SIGNIFICANT CHANGE UP (ref 0–0.9)
LYMPHOCYTES # BLD AUTO: 2.88 K/UL — SIGNIFICANT CHANGE UP (ref 1–3.3)
LYMPHOCYTES # BLD AUTO: 27.2 % — SIGNIFICANT CHANGE UP (ref 13–44)
MCHC RBC-ENTMCNC: 29.5 PG — SIGNIFICANT CHANGE UP (ref 27–34)
MCHC RBC-ENTMCNC: 32.3 G/DL — SIGNIFICANT CHANGE UP (ref 32–36)
MCV RBC AUTO: 91.5 FL — SIGNIFICANT CHANGE UP (ref 80–100)
MONOCYTES # BLD AUTO: 0.76 K/UL — SIGNIFICANT CHANGE UP (ref 0–0.9)
MONOCYTES NFR BLD AUTO: 7.2 % — SIGNIFICANT CHANGE UP (ref 2–14)
NEUTROPHILS # BLD AUTO: 5.69 K/UL — SIGNIFICANT CHANGE UP (ref 1.8–7.4)
NEUTROPHILS NFR BLD AUTO: 53.7 % — SIGNIFICANT CHANGE UP (ref 43–77)
NRBC # BLD: 0 /100 WBCS — SIGNIFICANT CHANGE UP (ref 0–0)
PLATELET # BLD AUTO: 293 K/UL — SIGNIFICANT CHANGE UP (ref 150–400)
POTASSIUM SERPL-MCNC: 4.7 MMOL/L — SIGNIFICANT CHANGE UP (ref 3.5–5.3)
POTASSIUM SERPL-SCNC: 4.7 MMOL/L — SIGNIFICANT CHANGE UP (ref 3.5–5.3)
PROT SERPL-MCNC: 7.7 G/DL — SIGNIFICANT CHANGE UP (ref 6–8.3)
RBC # BLD: 4.57 M/UL — SIGNIFICANT CHANGE UP (ref 3.8–5.2)
RBC # FLD: 13 % — SIGNIFICANT CHANGE UP (ref 10.3–14.5)
SODIUM SERPL-SCNC: 141 MMOL/L — SIGNIFICANT CHANGE UP (ref 135–145)
WBC # BLD: 10.59 K/UL — HIGH (ref 3.8–10.5)
WBC # FLD AUTO: 10.59 K/UL — HIGH (ref 3.8–10.5)

## 2024-07-24 PROCEDURE — G2211 COMPLEX E/M VISIT ADD ON: CPT | Mod: NC

## 2024-07-24 PROCEDURE — 99214 OFFICE O/P EST MOD 30 MIN: CPT

## 2024-07-25 DIAGNOSIS — Z51.11 ENCOUNTER FOR ANTINEOPLASTIC CHEMOTHERAPY: ICD-10-CM

## 2024-07-25 LAB
T4 FREE SERPL-MCNC: 1.5 NG/DL — SIGNIFICANT CHANGE UP (ref 0.9–1.8)
TSH SERPL-MCNC: 0.2 UIU/ML — LOW (ref 0.27–4.2)

## 2024-08-01 NOTE — HISTORY OF PRESENT ILLNESS
[Disease: _____________________] : Disease: [unfilled] [de-identified] : 55F who was diagnosed with lung cancer in 2012.  She had been having recurrent URI's with wheezing.  CXR showed a nodule and she therefore had a CT scan and PET/CT scan revealing a 1.3cm left apical soft tissue tumor with hypermetabolism in left hilum, left AP window and right azygoesophageal recess.  Patient underwent EBUS with biopys of left paratracheal LN with pathology consistent with adenocarcinoma of lung primary.  She was considered to have Stage IIIA/B disease (possibly N3 disease based on right sided findings).  She was treated with sequential chemo/RT with Cisplatin and Pemetrexed chemo begun in January 2013 and RT completed in June 2013.  She was followed with observation.  Underwent SBRT to MAICOL nodule in March 2016.  Surveillance CT chest on 3/11/19 revealed an enlarging MAICOL nodule.  She was referred back to Rad-Onc.  PET/CT was obtained in late March 2019 revealing this nodule to be hypermetabolic with concern for a lower hilar LN involvement.  She is now s/p EBUS on 4/25/19 with biopsy of a Level 7 LN that is positive for metastatic adenocarcinoma consistent with lung primary.  Tumor tested PDL1 negative and contains no actionable mutations.  Started systemic therapy with Carbo/Pem/Pembro in late May 2019.  Developed Carboplatin hypersensitivity reaction during C2 and Carbo was discontinued.  Treated with Pemetrexed + Pembrolizumab going forward.  Achieved LA.  Altered to q6 week administration at patient's insistence due to tolerability issues in July 2020.   Patient is status post admission at [facility] 9/1 - 9/8/2023 after presenting with new onset dyspnea and chest pain.  She was admitted for hypoxia due to left-sided hydropneumothorax secondary to bleb/emphysema and underwent chest tube placement that was subsequently removed.  She was treated with IV antibiotics for CAP.  She was found to have decreased ejection fraction, previously 45%'s down to 20% during hospitalization.  CTA chest was negative for PE and demonstrated left-sided hydropneumothorax with chest tube in place, bilateral pleural effusions and advanced emphysema.  She was medically managed.  Treated with single agent pemetrexed chemotherapy in October 2023 pending cardiology clearance.  She follows with cardiologist Dr. Navin Lawson.  She underwent cardiac catheterization 9/20/23 that revealed non-osbstructive CAD and revealed mild improvement of EF.  Treated with single She is felt to have a stress-induced cardiomyopathy and possibly Takotsubo cardiomyopathy.  Patient was evaluated by Cardio-Oncology Dr. Pavon with clearance to resume ICI-therapy as this was felt to be unlikely a culprit of the cardiomyopathy.  Resumed single agent pembrolizumab immunotherapy in early December 2023; pemetrexed chemotherapy was discontinued given multiple side effects attributed to the chemotherapy agent. [de-identified] : Adenocarcinoma [de-identified] : Patient reports occasional abdominal discomfort status postcholecystectomy in May. Otherwise feeling fairly stable.  Denies F/C/N/V/D.  No rash. She continues to smoke; smoking cessation discussed.  Restaging CT chest with overall sustained response; there is a nonspecific tiny RLL nodule that can be monitored on subsequent scans.

## 2024-08-01 NOTE — ASSESSMENT
[FreeTextEntry1] : Originally stage III NSCLC with adenocarcinoma histology s/p sequential chemo/RT completed June 2013. Received SBRT to an enlarging MAICOL nodule in March 2016. Surveillance CT scan in March 2019 with enlarging MAICOL nodule which was hypermetabolic on PET/CT with concern for LN involvement. Underwent Bronch with EBUS biopsy on 4/25/19 with a Level 7 LN biopsy confirming adenocarcinoma consistent with lung primary. Tumor tested PDL1 negative, tested TMB-High and molecular testing is negative for actionable mutations (TP53, among others). Started systemic therapy with Carbo/Pem/Pembro in late May 2019. Developed Carboplatin infusion reaction during C2 which was then discontinued. Treated with Pem/Pembro going forward. Achieved VT after C3. Treated with maintenance Pem/Pembro with sustained response. Altered to q6 week administration due to tolerability issues in July 2020 at patient's insistence. Hospitalized September 2023 for hydropneumothorax secondary to bleb/emphysema status post chest tube placement. She was treated for community-acquired pneumonia with IV antibiotics. She was found to have cardiomyopathy with a decreased EF.  Cardiac Cath Sept 2023 with non-obstructive coronary disease and improved EF.  Repeat Echo October 2023 with normalization of EF.  Patient felt to have a transient stress-induced CM; evaluated by Cardio-Onc with clearance to resume ICI-therapy.   Resumed single agent immunotherapy with Pembrolizumab in Dec 2023; the Pemetrexed chemotherapy was discontinued due to side effects.   Restaging CT Chest July 2024 with overall sustained response; non-specific nodules can be monitored.   Recommend: - Continue Pembrolizumab 400mg q6 weeks.  -Repeat labs today  - Continue Cardiology f/u and management s/p stress-induced cardiomyopathy.   - Continue f/u with pulmonary for management of COPD/emphysema.   - Follow-up with thoracic surgery status post hydropneumothorax secondary to emphysematous bleb.  Continued monitoring was recommended with consideration for surgery if pneumothorax recurs.   - On levothyroxine replacement for hypothyroidism; dosing adjusted by PCP.  Monitor TFT's.   - F/U prior to subsequent cycle or sooner should problems arise.  Plan to obtain her next restaging scan prior to her mid October visit.   Checkout form provided to patient with instructions for scheduling appointments through October

## 2024-08-01 NOTE — ASSESSMENT
[FreeTextEntry1] : Originally stage III NSCLC with adenocarcinoma histology s/p sequential chemo/RT completed June 2013. Received SBRT to an enlarging MAICOL nodule in March 2016. Surveillance CT scan in March 2019 with enlarging MAICOL nodule which was hypermetabolic on PET/CT with concern for LN involvement. Underwent Bronch with EBUS biopsy on 4/25/19 with a Level 7 LN biopsy confirming adenocarcinoma consistent with lung primary. Tumor tested PDL1 negative, tested TMB-High and molecular testing is negative for actionable mutations (TP53, among others). Started systemic therapy with Carbo/Pem/Pembro in late May 2019. Developed Carboplatin infusion reaction during C2 which was then discontinued. Treated with Pem/Pembro going forward. Achieved [place] after C3. Treated with maintenance Pem/Pembro with sustained response. Altered to q6 week administration due to tolerability issues in July 2020 at patient's insistence. Hospitalized September 2023 for hydropneumothorax secondary to bleb/emphysema status post chest tube placement. She was treated for community-acquired pneumonia with IV antibiotics. She was found to have cardiomyopathy with a decreased EF.  Cardiac Cath Sept 2023 with non-obstructive coronary disease and improved EF.  Repeat Echo October 2023 with normalization of EF.  Patient felt to have a transient stress-induced CM; evaluated by Cardio-Onc with clearance to resume ICI-therapy.   Resumed single agent immunotherapy with Pembrolizumab in Dec 2023; the Pemetrexed chemotherapy was discontinued due to side effects.   Restaging CT Chest July 2024 with overall sustained response; non-specific nodules can be monitored.   Recommend: - Continue Pembrolizumab 400mg q6 weeks.  -Repeat labs today  - Continue Cardiology f/u and management s/p stress-induced cardiomyopathy.   - Continue f/u with pulmonary for management of COPD/emphysema.   - Follow-up with thoracic surgery status post hydropneumothorax secondary to emphysematous bleb.  Continued monitoring was recommended with consideration for surgery if pneumothorax recurs.   - On levothyroxine replacement for hypothyroidism; dosing adjusted by PCP.  Monitor TFT's.   - F/U prior to subsequent cycle or sooner should problems arise.  Plan to obtain her next restaging scan prior to her mid October visit.   Checkout form provided to patient with instructions for scheduling appointments through October

## 2024-08-01 NOTE — HISTORY OF PRESENT ILLNESS
[Disease: _____________________] : Disease: [unfilled] [de-identified] : 55F who was diagnosed with lung cancer in 2012.  She had been having recurrent URI's with wheezing.  CXR showed a nodule and she therefore had a CT scan and PET/CT scan revealing a 1.3cm left apical soft tissue tumor with hypermetabolism in left hilum, left AP window and right azygoesophageal recess.  Patient underwent EBUS with biopys of left paratracheal LN with pathology consistent with adenocarcinoma of lung primary.  She was considered to have Stage IIIA/B disease (possibly N3 disease based on right sided findings).  She was treated with sequential chemo/RT with Cisplatin and Pemetrexed chemo begun in January 2013 and RT completed in June 2013.  She was followed with observation.  Underwent SBRT to MAICOL nodule in March 2016.  Surveillance CT chest on 3/11/19 revealed an enlarging MAICOL nodule.  She was referred back to Rad-Onc.  PET/CT was obtained in late March 2019 revealing this nodule to be hypermetabolic with concern for a lower hilar LN involvement.  She is now s/p EBUS on 4/25/19 with biopsy of a Level 7 LN that is positive for metastatic adenocarcinoma consistent with lung primary.  Tumor tested PDL1 negative and contains no actionable mutations.  Started systemic therapy with Carbo/Pem/Pembro in late May 2019.  Developed Carboplatin hypersensitivity reaction during C2 and Carbo was discontinued.  Treated with Pemetrexed + Pembrolizumab going forward.  Achieved [place].  Altered to q6 week administration at patient's insistence due to tolerability issues in July 2020.   Patient is status post admission at [facility] 9/1 - 9/8/2023 after presenting with new onset dyspnea and chest pain.  She was admitted for hypoxia due to left-sided hydropneumothorax secondary to bleb/emphysema and underwent chest tube placement that was subsequently removed.  She was treated with IV antibiotics for CAP.  She was found to have decreased ejection fraction, previously 45%'s down to 20% during hospitalization.  CTA chest was negative for PE and demonstrated left-sided hydropneumothorax with chest tube in place, bilateral pleural effusions and advanced emphysema.  She was medically managed.  Treated with single agent pemetrexed chemotherapy in October 2023 pending cardiology clearance.  She follows with cardiologist Dr. Navin Lawson.  She underwent cardiac catheterization 9/20/23 that revealed non-osbstructive CAD and revealed mild improvement of EF.  Treated with single She is felt to have a stress-induced cardiomyopathy and possibly Takotsubo cardiomyopathy.  Patient was evaluated by Cardio-Oncology Dr. Pavon with clearance to resume ICI-therapy as this was felt to be unlikely a culprit of the cardiomyopathy.  Resumed single agent pembrolizumab immunotherapy in early December 2023; pemetrexed chemotherapy was discontinued given multiple side effects attributed to the chemotherapy agent. [de-identified] : Adenocarcinoma [de-identified] : Patient reports occasional abdominal discomfort status postcholecystectomy in May. Otherwise feeling fairly stable.  Denies F/C/N/V/D.  No rash. She continues to smoke; smoking cessation discussed.  Restaging CT chest with overall sustained response; there is a nonspecific tiny RLL nodule that can be monitored on subsequent scans.

## 2024-08-01 NOTE — PHYSICAL EXAM
[Restricted in physically strenuous activity but ambulatory and able to carry out work of a light or sedentary nature] : Status 1- Restricted in physically strenuous activity but ambulatory and able to carry out work of a light or sedentary nature, e.g., light house work, office work [Normal] : affect appropriate [de-identified] : No icterus [de-identified] : TAVIA  [de-identified] : No LAD  [de-identified] : Clear [de-identified] : S1 S2 [de-identified] : No edema  [de-identified] : Soft NT [de-identified] : Ambulatory

## 2024-08-01 NOTE — PHYSICAL EXAM
[Restricted in physically strenuous activity but ambulatory and able to carry out work of a light or sedentary nature] : Status 1- Restricted in physically strenuous activity but ambulatory and able to carry out work of a light or sedentary nature, e.g., light house work, office work [Normal] : affect appropriate [de-identified] : No icterus [de-identified] : TAVIA  [de-identified] : No LAD  [de-identified] : Clear [de-identified] : S1 S2 [de-identified] : No edema  [de-identified] : Soft NT [de-identified] : Ambulatory

## 2024-08-01 NOTE — RESULTS/DATA
[FreeTextEntry1] : -CT Chest 3/11/19:  0.8 cm lobulated nodule in the apical segment of the left upper lobe has slightly increased in size when compared to previous exam.   -PET/CT 3/25/19:  Abnormal FDG-PET/CT scan.  1. Interval resolution of previously seen hypermetabolic left upper lung nodule with associated minimally FDG avid postradiation changes as compared to PET/CT from 10/20/2015, however with interval appearance of new hypermetabolic left upper lung nodule which is superiorly located. This is suspicious for malignancy, metastatic versus new lesion.  2. Interval increase in FDG avidity of previously seen left perihilar focus, with additional adjacent new foci. These are concerning for metastases.  3. Nonspecific diffuse esophageal hypermetabolism, not significantly changed, which may again reflect esophagitis. Please correlate clinically.  4. New mild diffuse hypermetabolism in the anterior oral cavity, difficult to delineate due to streak artifacts, probably physiologic salivary activity. Please correlate clinically.   -Level 7 Lymph Node EBUS guided FNA 4/25/19:  Positive for malignant cells.  Metastatic Adenocarcinoma consistent with lung primary.    -CT Chest 5/9/19:  Left upper lobe 8 mm nodule may be slightly larger than the prior study. Malignancy should be considered. Multiple other bilateral lung nodules measuring up to 5 mm are unchanged.. Patchy left upper lobe consolidation is similar compared to several prior studies.   -MRI Brain 5/13/19:  No evidence of brain metastases. Minimal small vessel white matter ischemic changes. No change since 12/19/2012.   -CT Chest 7/15/19:   1. The left upper lobe nodule has decreased in size and measures 5 mm.  2. The additional left upper lobe nodule and right upper lobe nodules are unchanged.  3. The left upper lobe opacities are unchanged since 5/9/2019.   -CT Chest 9/23/19:  1. Since 7/15/2019, the patchy left upper lobe opacities are unchanged.  2. Since 7/15/2019, 5 mm left upper lobe nodule is unchanged.  3. Since 9/10/2018, the additional bilateral upper lobe nodules are unchanged.  4. Emphysema   -CT Chest 11/29/19:  Left upper lobe previously described patchy opacity is unchanged since September 23, 2019. Interval resolution of a contiguous left upper lobe area of linear atelectasis adjacent to the anterior mediastinum. 5 mm right lower lobe nodular opacity new since September 23, 2019 may represent focus of impaction of a previously seen cyst.   -MRI Brain 1/14/20: No mass or abnormal intracranial enhancement to suggest metastatic disease.   -CT Chest 2/14/20:  Stable patchy opacity in the left upper lobe when compared to previous exam.  No significant change in the small ill-defined opacity in the superior segment of the right lower lobe when compared to previous exam.   -CT Chest 5/29/20:  Stable exam. No new or enlarging lung nodule.   -CT Chest 8/22/20:  8 mm right upper lobe nodule new since May 29, 2020 possibly due to opacification of a previously seen cyst with fluid. 3 month follow-up noncontrast chest CT is recommended for further evaluation given history of lung cancer. The other bilateral pulmonary nodules and the previously seen left upper lobe patchy opacity are unchanged.   -CT Chest 11/13/20:  Patchy opacity in the left upper lobe is unchanged when compared to previous exams.  Stable 0.8 cm solid nodule in the right upper lobe when compared to previous exam.  -CT Chest 2/8/21:  Previously described 7 mm right upper lobe pulmonary nodule demonstrate minimal if any interval decrease in size since November 13, 2020. Continued follow-up is recommended.  4 mm right upper lobe pulmonary nodule new since November 13, 2020. A 3 month follow-up noncontrast chest CT can be performed to ensure stability or resolution.  Previously described left upper lobe patchy opacity is unchanged.  -CT Chest 6/11/21: Irregular bandlike opacity within left upper lobe representing posttreatment changes is stable. Previously described 4 mm right upper lobe nodule (series 2 image 44) is stable. Another 7 mm right upper lobe nodule is resolved. No new or enlarging nodule.  -CT Chest 10/25/21:  No significant interval change. Stable irregular bandlike opacity within left upper lobe likely posttreatment changes. No new or enlarging nodule.  Emphysema-cystic lung disease.  -CT C/A/P 3/17/22:  Stable posttreatment changes within left upper lobe. No new or enlarging nodule.  No evidence of metastatic disease within abdomen or pelvis.  -CT Chest 9/2/22:  Ill-defined patchy opacity in the left upper lobe is unchanged when compared to previous exam.  Numerous irregular shaped cysts are noted within both lungs. They are unchanged when compared to previous exam.  -CT C/A/P 2/17/23:  Stable ill-defined patchy and nodular opacities in the left upper lobe, may be posttreatment related. No new disease in the chest.  No metastatic disease in the abdomen or pelvis.  -CT CAP 6/5/23:  Compared to the previous examination, no significant change is noted.  - Echo 9/2/2023: LVEF is 20%.  Moderate pulmonary hypertension.  No pericardial effusion.  - CT chest angiogram 9/3/2023: Negative for PE.  Left-sided hydropneumothorax.  Left-sided chest tube in place.  Bilateral pleural effusions.  Advanced emphysema.  -CT Chest 11/21/23:  No change since CT chest 6/5/2023:  Left upper lobe scarring and few small solid lung nodules.  -CT Chest 2/28/24:  No new disease in the chest.   Images Reviewed/Interpreted:  -CT Chest 7/19/24:  New nonspecific small right lower lobe nodule. Recommend surveillance CT chest in 3-6 months, for per guidelines clinical guidelines.

## 2024-09-04 ENCOUNTER — RESULT REVIEW (OUTPATIENT)
Age: 64
End: 2024-09-04

## 2024-09-04 ENCOUNTER — NON-APPOINTMENT (OUTPATIENT)
Age: 64
End: 2024-09-04

## 2024-09-04 ENCOUNTER — APPOINTMENT (OUTPATIENT)
Dept: INFUSION THERAPY | Facility: HOSPITAL | Age: 64
End: 2024-09-04

## 2024-09-04 ENCOUNTER — APPOINTMENT (OUTPATIENT)
Dept: HEMATOLOGY ONCOLOGY | Facility: CLINIC | Age: 64
End: 2024-09-04
Payer: COMMERCIAL

## 2024-09-04 VITALS
WEIGHT: 165.79 LBS | OXYGEN SATURATION: 96 % | SYSTOLIC BLOOD PRESSURE: 122 MMHG | HEIGHT: 60.04 IN | HEART RATE: 82 BPM | TEMPERATURE: 98 F | BODY MASS INDEX: 32.12 KG/M2 | RESPIRATION RATE: 16 BRPM | DIASTOLIC BLOOD PRESSURE: 85 MMHG

## 2024-09-04 LAB
ALBUMIN SERPL ELPH-MCNC: 4 G/DL — SIGNIFICANT CHANGE UP (ref 3.3–5)
ALP SERPL-CCNC: 70 U/L — SIGNIFICANT CHANGE UP (ref 40–120)
ALT FLD-CCNC: 15 U/L — SIGNIFICANT CHANGE UP (ref 10–45)
ANION GAP SERPL CALC-SCNC: 10 MMOL/L — SIGNIFICANT CHANGE UP (ref 5–17)
AST SERPL-CCNC: 21 U/L — SIGNIFICANT CHANGE UP (ref 10–40)
BASOPHILS # BLD AUTO: 0.1 K/UL — SIGNIFICANT CHANGE UP (ref 0–0.2)
BASOPHILS NFR BLD AUTO: 0.9 % — SIGNIFICANT CHANGE UP (ref 0–2)
BILIRUB SERPL-MCNC: 0.3 MG/DL — SIGNIFICANT CHANGE UP (ref 0.2–1.2)
BUN SERPL-MCNC: 7 MG/DL — SIGNIFICANT CHANGE UP (ref 7–23)
CALCIUM SERPL-MCNC: 10.4 MG/DL — SIGNIFICANT CHANGE UP (ref 8.4–10.5)
CHLORIDE SERPL-SCNC: 105 MMOL/L — SIGNIFICANT CHANGE UP (ref 96–108)
CO2 SERPL-SCNC: 25 MMOL/L — SIGNIFICANT CHANGE UP (ref 22–31)
CREAT SERPL-MCNC: 0.58 MG/DL — SIGNIFICANT CHANGE UP (ref 0.5–1.3)
EGFR: 102 ML/MIN/1.73M2 — SIGNIFICANT CHANGE UP
EOSINOPHIL # BLD AUTO: 1.17 K/UL — HIGH (ref 0–0.5)
EOSINOPHIL NFR BLD AUTO: 10 % — HIGH (ref 0–6)
GLUCOSE SERPL-MCNC: 105 MG/DL — HIGH (ref 70–99)
HCT VFR BLD CALC: 41.4 % — SIGNIFICANT CHANGE UP (ref 34.5–45)
HGB BLD-MCNC: 13.7 G/DL — SIGNIFICANT CHANGE UP (ref 11.5–15.5)
IMM GRANULOCYTES NFR BLD AUTO: 0.3 % — SIGNIFICANT CHANGE UP (ref 0–0.9)
LYMPHOCYTES # BLD AUTO: 2.65 K/UL — SIGNIFICANT CHANGE UP (ref 1–3.3)
LYMPHOCYTES # BLD AUTO: 22.6 % — SIGNIFICANT CHANGE UP (ref 13–44)
MCHC RBC-ENTMCNC: 29.6 PG — SIGNIFICANT CHANGE UP (ref 27–34)
MCHC RBC-ENTMCNC: 33.1 G/DL — SIGNIFICANT CHANGE UP (ref 32–36)
MCV RBC AUTO: 89.4 FL — SIGNIFICANT CHANGE UP (ref 80–100)
MONOCYTES # BLD AUTO: 0.89 K/UL — SIGNIFICANT CHANGE UP (ref 0–0.9)
MONOCYTES NFR BLD AUTO: 7.6 % — SIGNIFICANT CHANGE UP (ref 2–14)
NEUTROPHILS # BLD AUTO: 6.9 K/UL — SIGNIFICANT CHANGE UP (ref 1.8–7.4)
NEUTROPHILS NFR BLD AUTO: 58.6 % — SIGNIFICANT CHANGE UP (ref 43–77)
NRBC # BLD: 0 /100 WBCS — SIGNIFICANT CHANGE UP (ref 0–0)
PLATELET # BLD AUTO: 330 K/UL — SIGNIFICANT CHANGE UP (ref 150–400)
POTASSIUM SERPL-MCNC: 4.3 MMOL/L — SIGNIFICANT CHANGE UP (ref 3.5–5.3)
POTASSIUM SERPL-SCNC: 4.3 MMOL/L — SIGNIFICANT CHANGE UP (ref 3.5–5.3)
PROT SERPL-MCNC: 7.7 G/DL — SIGNIFICANT CHANGE UP (ref 6–8.3)
RBC # BLD: 4.63 M/UL — SIGNIFICANT CHANGE UP (ref 3.8–5.2)
RBC # FLD: 13.3 % — SIGNIFICANT CHANGE UP (ref 10.3–14.5)
SODIUM SERPL-SCNC: 140 MMOL/L — SIGNIFICANT CHANGE UP (ref 135–145)
WBC # BLD: 11.75 K/UL — HIGH (ref 3.8–10.5)
WBC # FLD AUTO: 11.75 K/UL — HIGH (ref 3.8–10.5)

## 2024-09-04 PROCEDURE — 99214 OFFICE O/P EST MOD 30 MIN: CPT

## 2024-09-04 NOTE — PHYSICAL EXAM
[Restricted in physically strenuous activity but ambulatory and able to carry out work of a light or sedentary nature] : Status 1- Restricted in physically strenuous activity but ambulatory and able to carry out work of a light or sedentary nature, e.g., light house work, office work [Normal] : affect appropriate [de-identified] : No icterus [de-identified] : TAVIA  [de-identified] : No LAD  [de-identified] : Clear [de-identified] : S1 S2 [de-identified] : No edema  [de-identified] : Soft NT [de-identified] : Ambulatory

## 2024-09-04 NOTE — HISTORY OF PRESENT ILLNESS
[Disease: _____________________] : Disease: [unfilled] [de-identified] : 55F who was diagnosed with lung cancer in 2012.  She had been having recurrent URI's with wheezing.  CXR showed a nodule and she therefore had a CT scan and PET/CT scan revealing a 1.3cm left apical soft tissue tumor with hypermetabolism in left hilum, left AP window and right azygoesophageal recess.  Patient underwent EBUS with biopys of left paratracheal LN with pathology consistent with adenocarcinoma of lung primary.  She was considered to have Stage IIIA/B disease (possibly N3 disease based on right sided findings).  She was treated with sequential chemo/RT with Cisplatin and Pemetrexed chemo begun in January 2013 and RT completed in June 2013.  She was followed with observation.  Underwent SBRT to MAICOL nodule in March 2016.  Surveillance CT chest on 3/11/19 revealed an enlarging MAICOL nodule.  She was referred back to Rad-Onc.  PET/CT was obtained in late March 2019 revealing this nodule to be hypermetabolic with concern for a lower hilar LN involvement.  She is now s/p EBUS on 4/25/19 with biopsy of a Level 7 LN that is positive for metastatic adenocarcinoma consistent with lung primary.  Tumor tested PDL1 negative and contains no actionable mutations.  Started systemic therapy with Carbo/Pem/Pembro in late May 2019.  Developed Carboplatin hypersensitivity reaction during C2 and Carbo was discontinued.  Treated with Pemetrexed + Pembrolizumab going forward.  Achieved [place].  Altered to q6 week administration at patient's insistence due to tolerability issues in July 2020.   Patient is status post admission at [facility] 9/1 - 9/8/2023 after presenting with new onset dyspnea and chest pain.  She was admitted for hypoxia due to left-sided hydropneumothorax secondary to bleb/emphysema and underwent chest tube placement that was subsequently removed.  She was treated with IV antibiotics for CAP.  She was found to have decreased ejection fraction, previously 45%'s down to 20% during hospitalization.  CTA chest was negative for PE and demonstrated left-sided hydropneumothorax with chest tube in place, bilateral pleural effusions and advanced emphysema.  She was medically managed.  Treated with single agent pemetrexed chemotherapy in October 2023 pending cardiology clearance.  She follows with cardiologist Dr. Navin Lawson.  She underwent cardiac catheterization 9/20/23 that revealed non-osbstructive CAD and revealed mild improvement of EF.  Treated with single She is felt to have a stress-induced cardiomyopathy and possibly Takotsubo cardiomyopathy.  Patient was evaluated by Cardio-Oncology Dr. Pavon with clearance to resume ICI-therapy as this was felt to be unlikely a culprit of the cardiomyopathy.  Resumed single agent pembrolizumab immunotherapy in early December 2023; pemetrexed chemotherapy was discontinued given multiple side effects attributed to the chemotherapy agent. [de-identified] : Adenocarcinoma [de-identified] : Patient feeling well though she does endorse some occasional mild dyspepsia.  She occasionally takes tums with some relief.  She has not been taking omeprazole "for some time". She reports that she stopped taking omeprazole and metoclopramide when she stopped chemotherapy.  Denies F/C/N/V/D.  No rash. She continues to smoke; smoking cessation discussed.  Restaging CT chest with overall sustained response; there is a nonspecific tiny RLL nodule that can be monitored on subsequent scans. Next scans due in october

## 2024-09-04 NOTE — ASSESSMENT
[FreeTextEntry1] : Originally stage III NSCLC with adenocarcinoma histology s/p sequential chemo/RT completed June 2013. Received SBRT to an enlarging MAICOL nodule in March 2016. Surveillance CT scan in March 2019 with enlarging MAICOL nodule which was hypermetabolic on PET/CT with concern for LN involvement. Underwent Bronch with EBUS biopsy on 4/25/19 with a Level 7 LN biopsy confirming adenocarcinoma consistent with lung primary. Tumor tested PDL1 negative, tested TMB-High and molecular testing is negative for actionable mutations (TP53, among others). Started systemic therapy with Carbo/Pem/Pembro in late May 2019. Developed Carboplatin infusion reaction during C2 which was then discontinued. Treated with Pem/Pembro going forward. Achieved [place] after C3. Treated with maintenance Pem/Pembro with sustained response. Altered to q6 week administration due to tolerability issues in July 2020 at patient's insistence. Hospitalized September 2023 for hydropneumothorax secondary to bleb/emphysema status post chest tube placement. She was treated for community-acquired pneumonia with IV antibiotics. She was found to have cardiomyopathy with a decreased EF.  Cardiac Cath Sept 2023 with non-obstructive coronary disease and improved EF.  Repeat Echo October 2023 with normalization of EF.  Patient felt to have a transient stress-induced CM; evaluated by Cardio-Onc with clearance to resume ICI-therapy.   Resumed single agent immunotherapy with Pembrolizumab in Dec 2023; the Pemetrexed chemotherapy was discontinued due to side effects.   Restaging CT Chest July 2024 with overall sustained response; non-specific nodules can be monitored.   Recommend: - Continue Pembrolizumab 400mg q6 weeks.  -Repeat labs today  -Recommend resume omeprazole 40mg BID for dyspepsia. - Continue Cardiology f/u and management s/p stress-induced cardiomyopathy.   - Continue f/u with pulmonary for management of COPD/emphysema.   - Follow-up with thoracic surgery status post hydropneumothorax secondary to emphysematous bleb.  Continued monitoring was recommended with consideration for surgery if pneumothorax recurs.   - On levothyroxine replacement for hypothyroidism; dosing adjusted by PCP.  Monitor TFT's.   - F/U prior to subsequent cycle or sooner should problems arise.  Plan to obtain her next restaging scan prior to her mid October visit.  Order placed for CT chest no contrast to be completed on or about 10/11/24 Checkout form provided to patient with instructions for scheduling appointments through October

## 2024-09-05 LAB
T4 FREE SERPL-MCNC: 1.7 NG/DL — SIGNIFICANT CHANGE UP (ref 0.9–1.8)
TSH SERPL-MCNC: 0.34 UIU/ML — SIGNIFICANT CHANGE UP (ref 0.27–4.2)

## 2024-10-04 ENCOUNTER — OUTPATIENT (OUTPATIENT)
Dept: OUTPATIENT SERVICES | Facility: HOSPITAL | Age: 64
LOS: 1 days | Discharge: HOME | End: 2024-10-04

## 2024-10-04 DIAGNOSIS — Z85.118 PERSONAL HISTORY OF OTHER MALIGNANT NEOPLASM OF BRONCHUS AND LUNG: Chronic | ICD-10-CM

## 2024-10-04 DIAGNOSIS — O03.9 COMPLETE OR UNSPECIFIED SPONTANEOUS ABORTION WITHOUT COMPLICATION: Chronic | ICD-10-CM

## 2024-10-04 DIAGNOSIS — C34.90 MALIGNANT NEOPLASM OF UNSPECIFIED PART OF UNSPECIFIED BRONCHUS OR LUNG: ICD-10-CM

## 2024-10-04 DIAGNOSIS — Z98.890 OTHER SPECIFIED POSTPROCEDURAL STATES: Chronic | ICD-10-CM

## 2024-10-11 ENCOUNTER — APPOINTMENT (OUTPATIENT)
Dept: CT IMAGING | Facility: CLINIC | Age: 64
End: 2024-10-11
Payer: COMMERCIAL

## 2024-10-11 PROCEDURE — 71250 CT THORAX DX C-: CPT

## 2024-10-16 ENCOUNTER — APPOINTMENT (OUTPATIENT)
Dept: HEMATOLOGY ONCOLOGY | Facility: CLINIC | Age: 64
End: 2024-10-16
Payer: COMMERCIAL

## 2024-10-16 ENCOUNTER — APPOINTMENT (OUTPATIENT)
Dept: INFUSION THERAPY | Facility: HOSPITAL | Age: 64
End: 2024-10-16

## 2024-10-16 VITALS
SYSTOLIC BLOOD PRESSURE: 122 MMHG | TEMPERATURE: 97.4 F | BODY MASS INDEX: 33.07 KG/M2 | OXYGEN SATURATION: 96 % | DIASTOLIC BLOOD PRESSURE: 81 MMHG | RESPIRATION RATE: 16 BRPM | HEART RATE: 92 BPM | WEIGHT: 169.53 LBS

## 2024-10-16 DIAGNOSIS — R91.8 OTHER NONSPECIFIC ABNORMAL FINDING OF LUNG FIELD: ICD-10-CM

## 2024-10-16 DIAGNOSIS — C34.12 MALIGNANT NEOPLASM OF UPPER LOBE, LEFT BRONCHUS OR LUNG: ICD-10-CM

## 2024-10-16 DIAGNOSIS — C77.1 SECONDARY AND UNSPECIFIED MALIGNANT NEOPLASM OF INTRATHORACIC LYMPH NODES: ICD-10-CM

## 2024-10-16 PROCEDURE — 99214 OFFICE O/P EST MOD 30 MIN: CPT

## 2024-10-16 PROCEDURE — G2211 COMPLEX E/M VISIT ADD ON: CPT | Mod: NC

## 2024-10-31 ENCOUNTER — APPOINTMENT (OUTPATIENT)
Dept: PULMONOLOGY | Facility: CLINIC | Age: 64
End: 2024-10-31
Payer: COMMERCIAL

## 2024-10-31 VITALS
RESPIRATION RATE: 16 BRPM | WEIGHT: 166 LBS | OXYGEN SATURATION: 94 % | BODY MASS INDEX: 32.59 KG/M2 | DIASTOLIC BLOOD PRESSURE: 76 MMHG | SYSTOLIC BLOOD PRESSURE: 117 MMHG | HEIGHT: 60 IN | TEMPERATURE: 98.2 F | HEART RATE: 92 BPM

## 2024-10-31 DIAGNOSIS — K21.9 GASTRO-ESOPHAGEAL REFLUX DISEASE W/OUT ESOPHAGITIS: ICD-10-CM

## 2024-10-31 DIAGNOSIS — J44.9 CHRONIC OBSTRUCTIVE PULMONARY DISEASE, UNSPECIFIED: ICD-10-CM

## 2024-10-31 DIAGNOSIS — J98.4 OTHER DISORDERS OF LUNG: ICD-10-CM

## 2024-10-31 DIAGNOSIS — C34.90 MALIGNANT NEOPLASM OF UNSPECIFIED PART OF UNSPECIFIED BRONCHUS OR LUNG: ICD-10-CM

## 2024-10-31 DIAGNOSIS — C34.12 MALIGNANT NEOPLASM OF UPPER LOBE, LEFT BRONCHUS OR LUNG: ICD-10-CM

## 2024-10-31 DIAGNOSIS — R91.8 OTHER NONSPECIFIC ABNORMAL FINDING OF LUNG FIELD: ICD-10-CM

## 2024-10-31 DIAGNOSIS — R05.3 CHRONIC COUGH: ICD-10-CM

## 2024-10-31 PROCEDURE — 99214 OFFICE O/P EST MOD 30 MIN: CPT

## 2024-10-31 PROCEDURE — G2211 COMPLEX E/M VISIT ADD ON: CPT | Mod: NC

## 2024-10-31 RX ORDER — DOXYCYCLINE HYCLATE 100 MG/1
100 CAPSULE ORAL
Qty: 14 | Refills: 0 | Status: ACTIVE | COMMUNITY
Start: 2024-10-31 | End: 1900-01-01

## 2024-10-31 RX ORDER — FLUTICASONE PROPIONATE 50 UG/1
50 SPRAY, METERED NASAL
Qty: 1 | Refills: 3 | Status: ACTIVE | COMMUNITY
Start: 2024-10-31 | End: 1900-01-01

## 2024-10-31 RX ORDER — GUAIFENESIN 400 MG/1
400 TABLET ORAL
Qty: 40 | Refills: 1 | Status: ACTIVE | COMMUNITY
Start: 2024-10-31 | End: 1900-01-01

## 2024-10-31 RX ORDER — BUDESONIDE AND FORMOTEROL FUMARATE DIHYDRATE 160; 4.5 UG/1; UG/1
160-4.5 AEROSOL RESPIRATORY (INHALATION) TWICE DAILY
Qty: 3 | Refills: 1 | Status: ACTIVE | COMMUNITY
Start: 2024-10-31 | End: 1900-01-01

## 2024-10-31 RX ORDER — PREDNISONE 20 MG/1
20 TABLET ORAL DAILY
Qty: 7 | Refills: 0 | Status: ACTIVE | COMMUNITY
Start: 2024-10-31 | End: 1900-01-01

## 2024-11-26 ENCOUNTER — OUTPATIENT (OUTPATIENT)
Dept: OUTPATIENT SERVICES | Facility: HOSPITAL | Age: 64
LOS: 1 days | Discharge: HOME | End: 2024-11-26

## 2024-11-26 DIAGNOSIS — Z98.890 OTHER SPECIFIED POSTPROCEDURAL STATES: Chronic | ICD-10-CM

## 2024-11-26 DIAGNOSIS — O03.9 COMPLETE OR UNSPECIFIED SPONTANEOUS ABORTION WITHOUT COMPLICATION: Chronic | ICD-10-CM

## 2024-11-26 DIAGNOSIS — Z85.118 PERSONAL HISTORY OF OTHER MALIGNANT NEOPLASM OF BRONCHUS AND LUNG: Chronic | ICD-10-CM

## 2024-11-26 DIAGNOSIS — C34.90 MALIGNANT NEOPLASM OF UNSPECIFIED PART OF UNSPECIFIED BRONCHUS OR LUNG: ICD-10-CM

## 2024-12-04 ENCOUNTER — RESULT REVIEW (OUTPATIENT)
Age: 64
End: 2024-12-04

## 2024-12-04 ENCOUNTER — APPOINTMENT (OUTPATIENT)
Dept: INFUSION THERAPY | Facility: HOSPITAL | Age: 64
End: 2024-12-04

## 2024-12-04 ENCOUNTER — APPOINTMENT (OUTPATIENT)
Dept: HEMATOLOGY ONCOLOGY | Facility: CLINIC | Age: 64
End: 2024-12-04
Payer: COMMERCIAL

## 2024-12-04 VITALS
BODY MASS INDEX: 32.72 KG/M2 | OXYGEN SATURATION: 97 % | WEIGHT: 167.55 LBS | SYSTOLIC BLOOD PRESSURE: 101 MMHG | TEMPERATURE: 97.4 F | RESPIRATION RATE: 16 BRPM | DIASTOLIC BLOOD PRESSURE: 68 MMHG | HEART RATE: 84 BPM

## 2024-12-04 DIAGNOSIS — R91.8 OTHER NONSPECIFIC ABNORMAL FINDING OF LUNG FIELD: ICD-10-CM

## 2024-12-04 DIAGNOSIS — C34.12 MALIGNANT NEOPLASM OF UPPER LOBE, LEFT BRONCHUS OR LUNG: ICD-10-CM

## 2024-12-04 DIAGNOSIS — E03.9 HYPOTHYROIDISM, UNSPECIFIED: ICD-10-CM

## 2024-12-04 DIAGNOSIS — C77.1 SECONDARY AND UNSPECIFIED MALIGNANT NEOPLASM OF INTRATHORACIC LYMPH NODES: ICD-10-CM

## 2024-12-04 LAB
ALBUMIN SERPL ELPH-MCNC: 3.8 G/DL — SIGNIFICANT CHANGE UP (ref 3.3–5)
ALP SERPL-CCNC: 71 U/L — SIGNIFICANT CHANGE UP (ref 40–120)
ALT FLD-CCNC: 14 U/L — SIGNIFICANT CHANGE UP (ref 10–45)
ANION GAP SERPL CALC-SCNC: 15 MMOL/L — SIGNIFICANT CHANGE UP (ref 5–17)
AST SERPL-CCNC: 22 U/L — SIGNIFICANT CHANGE UP (ref 10–40)
BASOPHILS # BLD AUTO: 0.09 K/UL — SIGNIFICANT CHANGE UP (ref 0–0.2)
BASOPHILS NFR BLD AUTO: 0.8 % — SIGNIFICANT CHANGE UP (ref 0–2)
BILIRUB SERPL-MCNC: 0.3 MG/DL — SIGNIFICANT CHANGE UP (ref 0.2–1.2)
BUN SERPL-MCNC: 9 MG/DL — SIGNIFICANT CHANGE UP (ref 7–23)
CALCIUM SERPL-MCNC: 9.8 MG/DL — SIGNIFICANT CHANGE UP (ref 8.4–10.5)
CHLORIDE SERPL-SCNC: 102 MMOL/L — SIGNIFICANT CHANGE UP (ref 96–108)
CO2 SERPL-SCNC: 22 MMOL/L — SIGNIFICANT CHANGE UP (ref 22–31)
CREAT SERPL-MCNC: 0.58 MG/DL — SIGNIFICANT CHANGE UP (ref 0.5–1.3)
EGFR: 102 ML/MIN/1.73M2 — SIGNIFICANT CHANGE UP
EOSINOPHIL # BLD AUTO: 1.08 K/UL — HIGH (ref 0–0.5)
EOSINOPHIL NFR BLD AUTO: 9.4 % — HIGH (ref 0–6)
GLUCOSE SERPL-MCNC: 107 MG/DL — HIGH (ref 70–99)
HCT VFR BLD CALC: 38.5 % — SIGNIFICANT CHANGE UP (ref 34.5–45)
HGB BLD-MCNC: 12.6 G/DL — SIGNIFICANT CHANGE UP (ref 11.5–15.5)
IMM GRANULOCYTES NFR BLD AUTO: 0.3 % — SIGNIFICANT CHANGE UP (ref 0–0.9)
LYMPHOCYTES # BLD AUTO: 2.73 K/UL — SIGNIFICANT CHANGE UP (ref 1–3.3)
LYMPHOCYTES # BLD AUTO: 23.7 % — SIGNIFICANT CHANGE UP (ref 13–44)
MCHC RBC-ENTMCNC: 30.6 PG — SIGNIFICANT CHANGE UP (ref 27–34)
MCHC RBC-ENTMCNC: 32.7 G/DL — SIGNIFICANT CHANGE UP (ref 32–36)
MCV RBC AUTO: 93.4 FL — SIGNIFICANT CHANGE UP (ref 80–100)
MONOCYTES # BLD AUTO: 0.89 K/UL — SIGNIFICANT CHANGE UP (ref 0–0.9)
MONOCYTES NFR BLD AUTO: 7.7 % — SIGNIFICANT CHANGE UP (ref 2–14)
NEUTROPHILS # BLD AUTO: 6.71 K/UL — SIGNIFICANT CHANGE UP (ref 1.8–7.4)
NEUTROPHILS NFR BLD AUTO: 58.1 % — SIGNIFICANT CHANGE UP (ref 43–77)
NRBC # BLD: 0 /100 WBCS — SIGNIFICANT CHANGE UP (ref 0–0)
NRBC BLD-RTO: 0 /100 WBCS — SIGNIFICANT CHANGE UP (ref 0–0)
PLATELET # BLD AUTO: 305 K/UL — SIGNIFICANT CHANGE UP (ref 150–400)
POTASSIUM SERPL-MCNC: 3.9 MMOL/L — SIGNIFICANT CHANGE UP (ref 3.5–5.3)
POTASSIUM SERPL-SCNC: 3.9 MMOL/L — SIGNIFICANT CHANGE UP (ref 3.5–5.3)
PROT SERPL-MCNC: 7.2 G/DL — SIGNIFICANT CHANGE UP (ref 6–8.3)
RBC # BLD: 4.12 M/UL — SIGNIFICANT CHANGE UP (ref 3.8–5.2)
RBC # FLD: 12.8 % — SIGNIFICANT CHANGE UP (ref 10.3–14.5)
SODIUM SERPL-SCNC: 138 MMOL/L — SIGNIFICANT CHANGE UP (ref 135–145)
T4 FREE SERPL-MCNC: 1.7 NG/DL — SIGNIFICANT CHANGE UP (ref 0.9–1.7)
TSH SERPL-MCNC: 0.31 UIU/ML — SIGNIFICANT CHANGE UP (ref 0.27–4.2)
WBC # BLD: 11.53 K/UL — HIGH (ref 3.8–10.5)
WBC # FLD AUTO: 11.53 K/UL — HIGH (ref 3.8–10.5)

## 2024-12-04 PROCEDURE — G2211 COMPLEX E/M VISIT ADD ON: CPT | Mod: NC

## 2024-12-04 PROCEDURE — 99214 OFFICE O/P EST MOD 30 MIN: CPT

## 2024-12-05 DIAGNOSIS — Z51.11 ENCOUNTER FOR ANTINEOPLASTIC CHEMOTHERAPY: ICD-10-CM

## 2024-12-12 ENCOUNTER — APPOINTMENT (OUTPATIENT)
Dept: PULMONOLOGY | Facility: CLINIC | Age: 64
End: 2024-12-12
Payer: COMMERCIAL

## 2024-12-12 VITALS
TEMPERATURE: 97.2 F | HEART RATE: 82 BPM | BODY MASS INDEX: 28.7 KG/M2 | HEIGHT: 63 IN | DIASTOLIC BLOOD PRESSURE: 71 MMHG | SYSTOLIC BLOOD PRESSURE: 109 MMHG | WEIGHT: 162 LBS | OXYGEN SATURATION: 95 % | RESPIRATION RATE: 16 BRPM

## 2024-12-12 DIAGNOSIS — J98.4 OTHER DISORDERS OF LUNG: ICD-10-CM

## 2024-12-12 DIAGNOSIS — C34.90 MALIGNANT NEOPLASM OF UNSPECIFIED PART OF UNSPECIFIED BRONCHUS OR LUNG: ICD-10-CM

## 2024-12-12 DIAGNOSIS — J44.9 CHRONIC OBSTRUCTIVE PULMONARY DISEASE, UNSPECIFIED: ICD-10-CM

## 2024-12-12 PROCEDURE — 99214 OFFICE O/P EST MOD 30 MIN: CPT

## 2024-12-12 PROCEDURE — 99407 BEHAV CHNG SMOKING > 10 MIN: CPT

## 2025-01-10 ENCOUNTER — APPOINTMENT (OUTPATIENT)
Dept: CT IMAGING | Facility: CLINIC | Age: 65
End: 2025-01-10
Payer: COMMERCIAL

## 2025-01-10 PROCEDURE — 71250 CT THORAX DX C-: CPT

## 2025-01-15 ENCOUNTER — RESULT REVIEW (OUTPATIENT)
Age: 65
End: 2025-01-15

## 2025-01-15 ENCOUNTER — APPOINTMENT (OUTPATIENT)
Dept: INFUSION THERAPY | Facility: HOSPITAL | Age: 65
End: 2025-01-15

## 2025-01-15 ENCOUNTER — APPOINTMENT (OUTPATIENT)
Dept: HEMATOLOGY ONCOLOGY | Facility: CLINIC | Age: 65
End: 2025-01-15
Payer: COMMERCIAL

## 2025-01-15 VITALS
HEART RATE: 88 BPM | BODY MASS INDEX: 28.86 KG/M2 | WEIGHT: 162.92 LBS | RESPIRATION RATE: 17 BRPM | SYSTOLIC BLOOD PRESSURE: 105 MMHG | DIASTOLIC BLOOD PRESSURE: 72 MMHG | OXYGEN SATURATION: 94 % | TEMPERATURE: 97 F

## 2025-01-15 DIAGNOSIS — E03.9 HYPOTHYROIDISM, UNSPECIFIED: ICD-10-CM

## 2025-01-15 DIAGNOSIS — C34.12 MALIGNANT NEOPLASM OF UPPER LOBE, LEFT BRONCHUS OR LUNG: ICD-10-CM

## 2025-01-15 DIAGNOSIS — D72.10 EOSINOPHILIA, UNSPECIFIED: ICD-10-CM

## 2025-01-15 DIAGNOSIS — C77.1 SECONDARY AND UNSPECIFIED MALIGNANT NEOPLASM OF INTRATHORACIC LYMPH NODES: ICD-10-CM

## 2025-01-15 LAB
BASOPHILS # BLD AUTO: 0.12 K/UL — SIGNIFICANT CHANGE UP (ref 0–0.2)
BASOPHILS NFR BLD AUTO: 1 % — SIGNIFICANT CHANGE UP (ref 0–2)
EOSINOPHIL # BLD AUTO: 1.14 K/UL — HIGH (ref 0–0.5)
EOSINOPHIL NFR BLD AUTO: 9.6 % — HIGH (ref 0–6)
HCT VFR BLD CALC: 40 % — SIGNIFICANT CHANGE UP (ref 34.5–45)
HGB BLD-MCNC: 13.4 G/DL — SIGNIFICANT CHANGE UP (ref 11.5–15.5)
IMM GRANULOCYTES NFR BLD AUTO: 0.3 % — SIGNIFICANT CHANGE UP (ref 0–0.9)
LYMPHOCYTES # BLD AUTO: 25.5 % — SIGNIFICANT CHANGE UP (ref 13–44)
LYMPHOCYTES # BLD AUTO: 3.03 K/UL — SIGNIFICANT CHANGE UP (ref 1–3.3)
MCHC RBC-ENTMCNC: 30.5 PG — SIGNIFICANT CHANGE UP (ref 27–34)
MCHC RBC-ENTMCNC: 33.5 G/DL — SIGNIFICANT CHANGE UP (ref 32–36)
MCV RBC AUTO: 90.9 FL — SIGNIFICANT CHANGE UP (ref 80–100)
MONOCYTES # BLD AUTO: 1.12 K/UL — HIGH (ref 0–0.9)
MONOCYTES NFR BLD AUTO: 9.4 % — SIGNIFICANT CHANGE UP (ref 2–14)
NEUTROPHILS # BLD AUTO: 6.45 K/UL — SIGNIFICANT CHANGE UP (ref 1.8–7.4)
NEUTROPHILS NFR BLD AUTO: 54.2 % — SIGNIFICANT CHANGE UP (ref 43–77)
NRBC # BLD: 0 /100 WBCS — SIGNIFICANT CHANGE UP (ref 0–0)
NRBC BLD-RTO: 0 /100 WBCS — SIGNIFICANT CHANGE UP (ref 0–0)
PLATELET # BLD AUTO: 338 K/UL — SIGNIFICANT CHANGE UP (ref 150–400)
RBC # BLD: 4.4 M/UL — SIGNIFICANT CHANGE UP (ref 3.8–5.2)
RBC # FLD: 12.6 % — SIGNIFICANT CHANGE UP (ref 10.3–14.5)
WBC # BLD: 11.89 K/UL — HIGH (ref 3.8–10.5)
WBC # FLD AUTO: 11.89 K/UL — HIGH (ref 3.8–10.5)

## 2025-01-15 PROCEDURE — G2211 COMPLEX E/M VISIT ADD ON: CPT | Mod: NC

## 2025-01-15 PROCEDURE — 99214 OFFICE O/P EST MOD 30 MIN: CPT

## 2025-01-16 LAB
ALBUMIN SERPL ELPH-MCNC: 4.5 G/DL
ALP BLD-CCNC: 72 U/L
ALT SERPL-CCNC: 15 U/L
ANION GAP SERPL CALC-SCNC: 12 MMOL/L
AST SERPL-CCNC: 17 U/L
BILIRUB SERPL-MCNC: 0.4 MG/DL
BUN SERPL-MCNC: 10 MG/DL
CALCIUM SERPL-MCNC: 10.3 MG/DL
CHLORIDE SERPL-SCNC: 103 MMOL/L
CO2 SERPL-SCNC: 24 MMOL/L
CREAT SERPL-MCNC: 0.66 MG/DL
EGFR: 99 ML/MIN/1.73M2
GLUCOSE SERPL-MCNC: 108 MG/DL
POTASSIUM SERPL-SCNC: 4.8 MMOL/L
PROT SERPL-MCNC: 7.5 G/DL
SODIUM SERPL-SCNC: 139 MMOL/L
T4 FREE SERPL-MCNC: 1.8 NG/DL
TROPONIN-T, HIGH SENSITIVITY: 8 NG/L
TRYPTASE: 6.7 UG/L
TSH SERPL-ACNC: 0.25 UIU/ML
VIT B12 SERPL-MCNC: 632 PG/ML

## 2025-02-21 ENCOUNTER — OUTPATIENT (OUTPATIENT)
Dept: OUTPATIENT SERVICES | Facility: HOSPITAL | Age: 65
LOS: 1 days | Discharge: HOME | End: 2025-02-21

## 2025-02-21 DIAGNOSIS — Z98.890 OTHER SPECIFIED POSTPROCEDURAL STATES: Chronic | ICD-10-CM

## 2025-02-21 DIAGNOSIS — C34.90 MALIGNANT NEOPLASM OF UNSPECIFIED PART OF UNSPECIFIED BRONCHUS OR LUNG: ICD-10-CM

## 2025-02-21 DIAGNOSIS — Z85.118 PERSONAL HISTORY OF OTHER MALIGNANT NEOPLASM OF BRONCHUS AND LUNG: Chronic | ICD-10-CM

## 2025-02-21 DIAGNOSIS — O03.9 COMPLETE OR UNSPECIFIED SPONTANEOUS ABORTION WITHOUT COMPLICATION: Chronic | ICD-10-CM

## 2025-02-26 ENCOUNTER — APPOINTMENT (OUTPATIENT)
Dept: INFUSION THERAPY | Facility: HOSPITAL | Age: 65
End: 2025-02-26

## 2025-02-26 ENCOUNTER — RESULT REVIEW (OUTPATIENT)
Age: 65
End: 2025-02-26

## 2025-02-26 ENCOUNTER — APPOINTMENT (OUTPATIENT)
Dept: HEMATOLOGY ONCOLOGY | Facility: CLINIC | Age: 65
End: 2025-02-26
Payer: COMMERCIAL

## 2025-02-26 DIAGNOSIS — C77.1 SECONDARY AND UNSPECIFIED MALIGNANT NEOPLASM OF INTRATHORACIC LYMPH NODES: ICD-10-CM

## 2025-02-26 DIAGNOSIS — C34.12 MALIGNANT NEOPLASM OF UPPER LOBE, LEFT BRONCHUS OR LUNG: ICD-10-CM

## 2025-02-26 LAB
ALBUMIN SERPL ELPH-MCNC: 4.2 G/DL — SIGNIFICANT CHANGE UP (ref 3.3–5)
ALP SERPL-CCNC: 70 U/L — SIGNIFICANT CHANGE UP (ref 40–120)
ALT FLD-CCNC: 16 U/L — SIGNIFICANT CHANGE UP (ref 10–45)
ANION GAP SERPL CALC-SCNC: 12 MMOL/L — SIGNIFICANT CHANGE UP (ref 5–17)
AST SERPL-CCNC: 24 U/L — SIGNIFICANT CHANGE UP (ref 10–40)
BASOPHILS # BLD AUTO: 0.08 K/UL — SIGNIFICANT CHANGE UP (ref 0–0.2)
BASOPHILS NFR BLD AUTO: 0.7 % — SIGNIFICANT CHANGE UP (ref 0–2)
BILIRUB SERPL-MCNC: 0.4 MG/DL — SIGNIFICANT CHANGE UP (ref 0.2–1.2)
BUN SERPL-MCNC: 12 MG/DL — SIGNIFICANT CHANGE UP (ref 7–23)
CALCIUM SERPL-MCNC: 10.1 MG/DL — SIGNIFICANT CHANGE UP (ref 8.4–10.5)
CHLORIDE SERPL-SCNC: 99 MMOL/L — SIGNIFICANT CHANGE UP (ref 96–108)
CO2 SERPL-SCNC: 22 MMOL/L — SIGNIFICANT CHANGE UP (ref 22–31)
CREAT SERPL-MCNC: 0.81 MG/DL — SIGNIFICANT CHANGE UP (ref 0.5–1.3)
EGFR: 82 ML/MIN/1.73M2 — SIGNIFICANT CHANGE UP
EGFR: 82 ML/MIN/1.73M2 — SIGNIFICANT CHANGE UP
EOSINOPHIL # BLD AUTO: 1.03 K/UL — HIGH (ref 0–0.5)
EOSINOPHIL NFR BLD AUTO: 9.5 % — HIGH (ref 0–6)
GLUCOSE SERPL-MCNC: 95 MG/DL — SIGNIFICANT CHANGE UP (ref 70–99)
HCT VFR BLD CALC: 37.7 % — SIGNIFICANT CHANGE UP (ref 34.5–45)
HGB BLD-MCNC: 12.6 G/DL — SIGNIFICANT CHANGE UP (ref 11.5–15.5)
IMM GRANULOCYTES NFR BLD AUTO: 0.4 % — SIGNIFICANT CHANGE UP (ref 0–0.9)
LYMPHOCYTES # BLD AUTO: 2.92 K/UL — SIGNIFICANT CHANGE UP (ref 1–3.3)
LYMPHOCYTES # BLD AUTO: 27.1 % — SIGNIFICANT CHANGE UP (ref 13–44)
MCHC RBC-ENTMCNC: 30.5 PG — SIGNIFICANT CHANGE UP (ref 27–34)
MCHC RBC-ENTMCNC: 33.4 G/DL — SIGNIFICANT CHANGE UP (ref 32–36)
MCV RBC AUTO: 91.3 FL — SIGNIFICANT CHANGE UP (ref 80–100)
MONOCYTES # BLD AUTO: 0.94 K/UL — HIGH (ref 0–0.9)
MONOCYTES NFR BLD AUTO: 8.7 % — SIGNIFICANT CHANGE UP (ref 2–14)
NEUTROPHILS # BLD AUTO: 5.78 K/UL — SIGNIFICANT CHANGE UP (ref 1.8–7.4)
NEUTROPHILS NFR BLD AUTO: 53.6 % — SIGNIFICANT CHANGE UP (ref 43–77)
NRBC BLD AUTO-RTO: 0 /100 WBCS — SIGNIFICANT CHANGE UP (ref 0–0)
PLATELET # BLD AUTO: 321 K/UL — SIGNIFICANT CHANGE UP (ref 150–400)
POTASSIUM SERPL-MCNC: 4.8 MMOL/L — SIGNIFICANT CHANGE UP (ref 3.5–5.3)
POTASSIUM SERPL-SCNC: 4.8 MMOL/L — SIGNIFICANT CHANGE UP (ref 3.5–5.3)
PROT SERPL-MCNC: 7.7 G/DL — SIGNIFICANT CHANGE UP (ref 6–8.3)
RBC # BLD: 4.13 M/UL — SIGNIFICANT CHANGE UP (ref 3.8–5.2)
RBC # FLD: 13 % — SIGNIFICANT CHANGE UP (ref 10.3–14.5)
SODIUM SERPL-SCNC: 133 MMOL/L — LOW (ref 135–145)
T4 FREE SERPL-MCNC: 1.3 NG/DL — SIGNIFICANT CHANGE UP (ref 0.9–1.7)
TSH SERPL-MCNC: 1.91 UIU/ML — SIGNIFICANT CHANGE UP (ref 0.27–4.2)
WBC # BLD: 10.79 K/UL — HIGH (ref 3.8–10.5)
WBC # FLD AUTO: 10.79 K/UL — HIGH (ref 3.8–10.5)

## 2025-02-26 PROCEDURE — 99213 OFFICE O/P EST LOW 20 MIN: CPT

## 2025-02-27 DIAGNOSIS — Z51.11 ENCOUNTER FOR ANTINEOPLASTIC CHEMOTHERAPY: ICD-10-CM

## 2025-03-21 ENCOUNTER — APPOINTMENT (OUTPATIENT)
Dept: PULMONOLOGY | Facility: CLINIC | Age: 65
End: 2025-03-21
Payer: COMMERCIAL

## 2025-03-21 VITALS
HEIGHT: 63 IN | OXYGEN SATURATION: 97 % | WEIGHT: 160 LBS | SYSTOLIC BLOOD PRESSURE: 96 MMHG | HEART RATE: 88 BPM | DIASTOLIC BLOOD PRESSURE: 75 MMHG | BODY MASS INDEX: 28.35 KG/M2 | TEMPERATURE: 97.7 F

## 2025-03-21 DIAGNOSIS — Z01.818 ENCOUNTER FOR OTHER PREPROCEDURAL EXAMINATION: ICD-10-CM

## 2025-03-21 DIAGNOSIS — K21.9 GASTRO-ESOPHAGEAL REFLUX DISEASE W/OUT ESOPHAGITIS: ICD-10-CM

## 2025-03-21 DIAGNOSIS — R05.3 CHRONIC COUGH: ICD-10-CM

## 2025-03-21 DIAGNOSIS — C34.12 MALIGNANT NEOPLASM OF UPPER LOBE, LEFT BRONCHUS OR LUNG: ICD-10-CM

## 2025-03-21 DIAGNOSIS — J44.9 CHRONIC OBSTRUCTIVE PULMONARY DISEASE, UNSPECIFIED: ICD-10-CM

## 2025-03-21 DIAGNOSIS — J98.4 OTHER DISORDERS OF LUNG: ICD-10-CM

## 2025-03-21 DIAGNOSIS — J44.89 OTHER SPECIFIED CHRONIC OBSTRUCTIVE PULMONARY DISEASE: ICD-10-CM

## 2025-03-21 PROCEDURE — 94729 DIFFUSING CAPACITY: CPT

## 2025-03-21 PROCEDURE — 94727 GAS DIL/WSHOT DETER LNG VOL: CPT

## 2025-03-21 PROCEDURE — 99215 OFFICE O/P EST HI 40 MIN: CPT | Mod: 25

## 2025-03-21 PROCEDURE — 94060 EVALUATION OF WHEEZING: CPT

## 2025-03-21 RX ORDER — ALBUTEROL SULFATE 2.5 MG/3ML
(2.5 MG/3ML) SOLUTION RESPIRATORY (INHALATION) TWICE DAILY
Qty: 1 | Refills: 3 | Status: ACTIVE | COMMUNITY
Start: 2025-03-21 | End: 1900-01-01

## 2025-03-21 RX ORDER — PREDNISONE 20 MG/1
20 TABLET ORAL DAILY
Qty: 7 | Refills: 0 | Status: ACTIVE | COMMUNITY
Start: 2025-03-21 | End: 1900-01-01

## 2025-03-27 ENCOUNTER — APPOINTMENT (OUTPATIENT)
Dept: PULMONOLOGY | Facility: CLINIC | Age: 65
End: 2025-03-27

## 2025-05-14 ENCOUNTER — APPOINTMENT (OUTPATIENT)
Dept: ULTRASOUND IMAGING | Facility: CLINIC | Age: 65
End: 2025-05-14
Payer: COMMERCIAL

## 2025-05-14 ENCOUNTER — APPOINTMENT (OUTPATIENT)
Dept: ULTRASOUND IMAGING | Facility: CLINIC | Age: 65
End: 2025-05-14

## 2025-05-14 ENCOUNTER — APPOINTMENT (OUTPATIENT)
Dept: RADIOLOGY | Facility: CLINIC | Age: 65
End: 2025-05-14
Payer: COMMERCIAL

## 2025-05-14 PROCEDURE — 76830 TRANSVAGINAL US NON-OB: CPT

## 2025-05-14 PROCEDURE — 73522 X-RAY EXAM HIPS BI 3-4 VIEWS: CPT

## 2025-05-14 PROCEDURE — 76856 US EXAM PELVIC COMPLETE: CPT

## 2025-05-14 PROCEDURE — 93970 EXTREMITY STUDY: CPT

## 2025-05-14 PROCEDURE — 93925 LOWER EXTREMITY STUDY: CPT

## 2025-05-27 ENCOUNTER — OUTPATIENT (OUTPATIENT)
Dept: OUTPATIENT SERVICES | Facility: HOSPITAL | Age: 65
LOS: 1 days | Discharge: HOME | End: 2025-05-27

## 2025-05-27 DIAGNOSIS — O03.9 COMPLETE OR UNSPECIFIED SPONTANEOUS ABORTION WITHOUT COMPLICATION: Chronic | ICD-10-CM

## 2025-05-27 DIAGNOSIS — C34.90 MALIGNANT NEOPLASM OF UNSPECIFIED PART OF UNSPECIFIED BRONCHUS OR LUNG: ICD-10-CM

## 2025-05-27 DIAGNOSIS — Z98.890 OTHER SPECIFIED POSTPROCEDURAL STATES: Chronic | ICD-10-CM

## 2025-05-27 DIAGNOSIS — Z85.118 PERSONAL HISTORY OF OTHER MALIGNANT NEOPLASM OF BRONCHUS AND LUNG: Chronic | ICD-10-CM

## 2025-05-29 ENCOUNTER — APPOINTMENT (OUTPATIENT)
Dept: RADIOLOGY | Facility: CLINIC | Age: 65
End: 2025-05-29
Payer: COMMERCIAL

## 2025-05-29 PROCEDURE — 77080 DXA BONE DENSITY AXIAL: CPT

## 2025-06-04 ENCOUNTER — APPOINTMENT (OUTPATIENT)
Dept: INFUSION THERAPY | Facility: HOSPITAL | Age: 65
End: 2025-06-04

## 2025-06-04 ENCOUNTER — APPOINTMENT (OUTPATIENT)
Dept: HEMATOLOGY ONCOLOGY | Facility: CLINIC | Age: 65
End: 2025-06-04
Payer: COMMERCIAL

## 2025-06-04 ENCOUNTER — RESULT REVIEW (OUTPATIENT)
Age: 65
End: 2025-06-04

## 2025-06-04 ENCOUNTER — NON-APPOINTMENT (OUTPATIENT)
Age: 65
End: 2025-06-04

## 2025-06-04 VITALS
OXYGEN SATURATION: 98 % | HEART RATE: 78 BPM | SYSTOLIC BLOOD PRESSURE: 99 MMHG | TEMPERATURE: 97.4 F | RESPIRATION RATE: 16 BRPM | BODY MASS INDEX: 28.51 KG/M2 | DIASTOLIC BLOOD PRESSURE: 67 MMHG | WEIGHT: 160.93 LBS

## 2025-06-04 DIAGNOSIS — L85.3 XEROSIS CUTIS: ICD-10-CM

## 2025-06-04 DIAGNOSIS — L29.9 PRURITUS, UNSPECIFIED: ICD-10-CM

## 2025-06-04 DIAGNOSIS — E03.9 HYPOTHYROIDISM, UNSPECIFIED: ICD-10-CM

## 2025-06-04 DIAGNOSIS — C34.12 MALIGNANT NEOPLASM OF UPPER LOBE, LEFT BRONCHUS OR LUNG: ICD-10-CM

## 2025-06-04 DIAGNOSIS — C77.1 SECONDARY AND UNSPECIFIED MALIGNANT NEOPLASM OF INTRATHORACIC LYMPH NODES: ICD-10-CM

## 2025-06-04 PROCEDURE — 99214 OFFICE O/P EST MOD 30 MIN: CPT

## 2025-06-05 DIAGNOSIS — Z51.11 ENCOUNTER FOR ANTINEOPLASTIC CHEMOTHERAPY: ICD-10-CM

## 2025-06-05 LAB
ALBUMIN SERPL ELPH-MCNC: 3.9 G/DL — SIGNIFICANT CHANGE UP (ref 3.3–5)
ALP SERPL-CCNC: 70 U/L — SIGNIFICANT CHANGE UP (ref 40–120)
ALT FLD-CCNC: 26 U/L — SIGNIFICANT CHANGE UP (ref 10–40)
ANION GAP SERPL CALC-SCNC: 17 MMOL/L — SIGNIFICANT CHANGE UP (ref 5–17)
AST SERPL-CCNC: 19 U/L — SIGNIFICANT CHANGE UP (ref 10–35)
BASOPHILS # BLD AUTO: 0.1 K/UL — SIGNIFICANT CHANGE UP (ref 0–0.2)
BASOPHILS NFR BLD AUTO: 0.8 % — SIGNIFICANT CHANGE UP (ref 0–2)
BILIRUB SERPL-MCNC: 0.2 MG/DL — SIGNIFICANT CHANGE UP (ref 0.2–1.2)
BUN SERPL-MCNC: 13 MG/DL — SIGNIFICANT CHANGE UP (ref 7–23)
CALCIUM SERPL-MCNC: 9.6 MG/DL — SIGNIFICANT CHANGE UP (ref 8.4–10.5)
CHLORIDE SERPL-SCNC: 101 MMOL/L — SIGNIFICANT CHANGE UP (ref 96–108)
CO2 SERPL-SCNC: 17 MMOL/L — LOW (ref 22–31)
CREAT SERPL-MCNC: 0.87 MG/DL — SIGNIFICANT CHANGE UP (ref 0.5–1.3)
EGFR: 75 ML/MIN/1.73M2 — SIGNIFICANT CHANGE UP
EGFR: 75 ML/MIN/1.73M2 — SIGNIFICANT CHANGE UP
EOSINOPHIL # BLD AUTO: 1.12 K/UL — HIGH (ref 0–0.5)
EOSINOPHIL NFR BLD AUTO: 9.5 % — HIGH (ref 0–6)
GLUCOSE SERPL-MCNC: 58 MG/DL — LOW (ref 70–99)
HCT VFR BLD CALC: 37 % — SIGNIFICANT CHANGE UP (ref 34.5–45)
HGB BLD-MCNC: 11.5 G/DL — SIGNIFICANT CHANGE UP (ref 11.5–15.5)
IMM GRANULOCYTES NFR BLD AUTO: 0.2 % — SIGNIFICANT CHANGE UP (ref 0–0.9)
LYMPHOCYTES # BLD AUTO: 2.99 K/UL — SIGNIFICANT CHANGE UP (ref 1–3.3)
LYMPHOCYTES # BLD AUTO: 25.3 % — SIGNIFICANT CHANGE UP (ref 13–44)
MCHC RBC-ENTMCNC: 30.4 PG — SIGNIFICANT CHANGE UP (ref 27–34)
MCHC RBC-ENTMCNC: 31.1 G/DL — LOW (ref 32–36)
MCV RBC AUTO: 97.9 FL — SIGNIFICANT CHANGE UP (ref 80–100)
MONOCYTES # BLD AUTO: 0.84 K/UL — SIGNIFICANT CHANGE UP (ref 0–0.9)
MONOCYTES NFR BLD AUTO: 7.1 % — SIGNIFICANT CHANGE UP (ref 2–14)
NEUTROPHILS # BLD AUTO: 6.75 K/UL — SIGNIFICANT CHANGE UP (ref 1.8–7.4)
NEUTROPHILS NFR BLD AUTO: 57.1 % — SIGNIFICANT CHANGE UP (ref 43–77)
PLATELET # BLD AUTO: 342 K/UL — SIGNIFICANT CHANGE UP (ref 150–400)
POTASSIUM SERPL-MCNC: 4.5 MMOL/L — SIGNIFICANT CHANGE UP (ref 3.5–5.3)
POTASSIUM SERPL-SCNC: 4.5 MMOL/L — SIGNIFICANT CHANGE UP (ref 3.5–5.3)
PROT SERPL-MCNC: 7.1 G/DL — SIGNIFICANT CHANGE UP (ref 6–8.3)
RBC # BLD: 3.78 M/UL — LOW (ref 3.8–5.2)
RBC # FLD: 13.3 % — SIGNIFICANT CHANGE UP (ref 10.3–14.5)
SODIUM SERPL-SCNC: 135 MMOL/L — SIGNIFICANT CHANGE UP (ref 135–145)
T4 FREE SERPL-MCNC: 1.8 NG/DL — SIGNIFICANT CHANGE UP (ref 0.9–1.8)
TSH SERPL-MCNC: 0.66 UIU/ML — SIGNIFICANT CHANGE UP (ref 0.27–4.2)
WBC # BLD: 11.82 K/UL — HIGH (ref 3.8–10.5)
WBC # FLD AUTO: 11.82 K/UL — HIGH (ref 3.8–10.5)

## 2025-07-21 ENCOUNTER — APPOINTMENT (OUTPATIENT)
Dept: CT IMAGING | Facility: CLINIC | Age: 65
End: 2025-07-21
Payer: COMMERCIAL

## 2025-07-21 PROCEDURE — 71250 CT THORAX DX C-: CPT

## 2025-07-24 ENCOUNTER — APPOINTMENT (OUTPATIENT)
Dept: PULMONOLOGY | Facility: CLINIC | Age: 65
End: 2025-07-24
Payer: COMMERCIAL

## 2025-07-24 VITALS
HEART RATE: 109 BPM | WEIGHT: 148 LBS | OXYGEN SATURATION: 97 % | BODY MASS INDEX: 26.22 KG/M2 | TEMPERATURE: 98 F | SYSTOLIC BLOOD PRESSURE: 96 MMHG | DIASTOLIC BLOOD PRESSURE: 66 MMHG | HEIGHT: 63 IN

## 2025-07-24 DIAGNOSIS — R05.3 CHRONIC COUGH: ICD-10-CM

## 2025-07-24 DIAGNOSIS — R11.0 NAUSEA: ICD-10-CM

## 2025-07-24 DIAGNOSIS — C34.90 MALIGNANT NEOPLASM OF UNSPECIFIED PART OF UNSPECIFIED BRONCHUS OR LUNG: ICD-10-CM

## 2025-07-24 DIAGNOSIS — R91.8 OTHER NONSPECIFIC ABNORMAL FINDING OF LUNG FIELD: ICD-10-CM

## 2025-07-24 DIAGNOSIS — J98.4 OTHER DISORDERS OF LUNG: ICD-10-CM

## 2025-07-24 DIAGNOSIS — J44.89 OTHER SPECIFIED CHRONIC OBSTRUCTIVE PULMONARY DISEASE: ICD-10-CM

## 2025-07-24 PROCEDURE — G2211 COMPLEX E/M VISIT ADD ON: CPT | Mod: NC

## 2025-07-24 PROCEDURE — 99214 OFFICE O/P EST MOD 30 MIN: CPT

## 2025-07-24 RX ORDER — EZETIMIBE 10 MG/1
10 TABLET ORAL DAILY
Refills: 0 | Status: ACTIVE | COMMUNITY

## 2025-07-24 RX ORDER — FAMOTIDINE 20 MG/1
20 TABLET, FILM COATED ORAL TWICE DAILY
Refills: 0 | Status: ACTIVE | COMMUNITY

## 2025-07-24 RX ORDER — HYDROXYCHLOROQUINE SULFATE 200 MG/1
200 TABLET, FILM COATED ORAL TWICE DAILY
Refills: 0 | Status: ACTIVE | COMMUNITY

## 2025-07-24 RX ORDER — ROSUVASTATIN CALCIUM 10 MG/1
10 TABLET, FILM COATED ORAL
Refills: 0 | Status: ACTIVE | COMMUNITY

## 2025-07-24 RX ORDER — PEMBROLIZUMAB 25 MG/ML
INJECTION, SOLUTION INTRAVENOUS
Refills: 0 | Status: ACTIVE | COMMUNITY

## 2025-07-24 RX ORDER — LORAZEPAM 0.5 MG/1
0.5 TABLET ORAL
Refills: 0 | Status: ACTIVE | COMMUNITY

## 2025-07-25 ENCOUNTER — RESULT REVIEW (OUTPATIENT)
Age: 65
End: 2025-07-25

## 2025-07-25 ENCOUNTER — APPOINTMENT (OUTPATIENT)
Dept: INFUSION THERAPY | Facility: HOSPITAL | Age: 65
End: 2025-07-25

## 2025-07-25 ENCOUNTER — NON-APPOINTMENT (OUTPATIENT)
Age: 65
End: 2025-07-25

## 2025-07-25 ENCOUNTER — APPOINTMENT (OUTPATIENT)
Dept: HEMATOLOGY ONCOLOGY | Facility: CLINIC | Age: 65
End: 2025-07-25
Payer: COMMERCIAL

## 2025-07-25 VITALS
SYSTOLIC BLOOD PRESSURE: 108 MMHG | OXYGEN SATURATION: 99 % | HEART RATE: 105 BPM | DIASTOLIC BLOOD PRESSURE: 70 MMHG | BODY MASS INDEX: 28.25 KG/M2 | HEIGHT: 60.63 IN | WEIGHT: 147.71 LBS | RESPIRATION RATE: 16 BRPM | TEMPERATURE: 98.4 F

## 2025-07-25 DIAGNOSIS — C77.1 SECONDARY AND UNSPECIFIED MALIGNANT NEOPLASM OF INTRATHORACIC LYMPH NODES: ICD-10-CM

## 2025-07-25 DIAGNOSIS — K21.9 GASTRO-ESOPHAGEAL REFLUX DISEASE W/OUT ESOPHAGITIS: ICD-10-CM

## 2025-07-25 DIAGNOSIS — C34.12 MALIGNANT NEOPLASM OF UPPER LOBE, LEFT BRONCHUS OR LUNG: ICD-10-CM

## 2025-07-25 LAB
ALBUMIN SERPL ELPH-MCNC: 4.5 G/DL — SIGNIFICANT CHANGE UP (ref 3.3–5)
ALP SERPL-CCNC: 58 U/L — SIGNIFICANT CHANGE UP (ref 40–120)
ALT FLD-CCNC: 13 U/L — SIGNIFICANT CHANGE UP (ref 10–45)
ANION GAP SERPL CALC-SCNC: 13 MMOL/L — SIGNIFICANT CHANGE UP (ref 5–17)
AST SERPL-CCNC: 23 U/L — SIGNIFICANT CHANGE UP (ref 10–40)
BASOPHILS # BLD AUTO: 0.16 K/UL — SIGNIFICANT CHANGE UP (ref 0–0.2)
BASOPHILS NFR BLD AUTO: 1.3 % — SIGNIFICANT CHANGE UP (ref 0–2)
BILIRUB SERPL-MCNC: 0.3 MG/DL — SIGNIFICANT CHANGE UP (ref 0.2–1.2)
BUN SERPL-MCNC: 16 MG/DL — SIGNIFICANT CHANGE UP (ref 7–23)
CALCIUM SERPL-MCNC: 9.9 MG/DL — SIGNIFICANT CHANGE UP (ref 8.4–10.5)
CHLORIDE SERPL-SCNC: 97 MMOL/L — SIGNIFICANT CHANGE UP (ref 96–108)
CO2 SERPL-SCNC: 20 MMOL/L — LOW (ref 22–31)
CREAT SERPL-MCNC: 1.08 MG/DL — SIGNIFICANT CHANGE UP (ref 0.5–1.3)
EGFR: 58 ML/MIN/1.73M2 — LOW
EGFR: 58 ML/MIN/1.73M2 — LOW
EOSINOPHIL # BLD AUTO: 1.08 K/UL — HIGH (ref 0–0.5)
EOSINOPHIL NFR BLD AUTO: 8.5 % — HIGH (ref 0–6)
GLUCOSE SERPL-MCNC: 96 MG/DL — SIGNIFICANT CHANGE UP (ref 70–99)
HCT VFR BLD CALC: 33.2 % — LOW (ref 34.5–45)
HGB BLD-MCNC: 11.5 G/DL — SIGNIFICANT CHANGE UP (ref 11.5–15.5)
IMM GRANULOCYTES NFR BLD AUTO: 0.3 % — SIGNIFICANT CHANGE UP (ref 0–0.9)
LYMPHOCYTES # BLD AUTO: 2.83 K/UL — SIGNIFICANT CHANGE UP (ref 1–3.3)
LYMPHOCYTES # BLD AUTO: 22.2 % — SIGNIFICANT CHANGE UP (ref 13–44)
MCHC RBC-ENTMCNC: 31.3 PG — SIGNIFICANT CHANGE UP (ref 27–34)
MCHC RBC-ENTMCNC: 34.6 G/DL — SIGNIFICANT CHANGE UP (ref 32–36)
MCV RBC AUTO: 90 FL — SIGNIFICANT CHANGE UP (ref 80–100)
MONOCYTES # BLD AUTO: 1.02 K/UL — HIGH (ref 0–0.9)
MONOCYTES NFR BLD AUTO: 8 % — SIGNIFICANT CHANGE UP (ref 2–14)
NEUTROPHILS # BLD AUTO: 7.64 K/UL — HIGH (ref 1.8–7.4)
NEUTROPHILS NFR BLD AUTO: 59.7 % — SIGNIFICANT CHANGE UP (ref 43–77)
NRBC BLD AUTO-RTO: 0 /100 WBCS — SIGNIFICANT CHANGE UP (ref 0–0)
PLATELET # BLD AUTO: 331 K/UL — SIGNIFICANT CHANGE UP (ref 150–400)
POTASSIUM SERPL-MCNC: 4.6 MMOL/L — SIGNIFICANT CHANGE UP (ref 3.5–5.3)
POTASSIUM SERPL-SCNC: 4.6 MMOL/L — SIGNIFICANT CHANGE UP (ref 3.5–5.3)
PROT SERPL-MCNC: 7.7 G/DL — SIGNIFICANT CHANGE UP (ref 6–8.3)
RBC # BLD: 3.68 M/UL — LOW (ref 3.8–5.2)
RBC # FLD: 12.1 % — SIGNIFICANT CHANGE UP (ref 10.3–14.5)
SODIUM SERPL-SCNC: 131 MMOL/L — LOW (ref 135–145)
WBC # BLD: 12.77 K/UL — HIGH (ref 3.8–10.5)
WBC # FLD AUTO: 12.77 K/UL — HIGH (ref 3.8–10.5)

## 2025-07-25 PROCEDURE — G2211 COMPLEX E/M VISIT ADD ON: CPT | Mod: NC

## 2025-07-25 PROCEDURE — 99214 OFFICE O/P EST MOD 30 MIN: CPT

## 2025-07-25 RX ORDER — METOCLOPRAMIDE 10 MG/1
10 TABLET ORAL
Qty: 60 | Refills: 5 | Status: ACTIVE | COMMUNITY
Start: 2025-07-25 | End: 1900-01-01

## 2025-07-26 LAB
T4 FREE SERPL-MCNC: 1.8 NG/DL — SIGNIFICANT CHANGE UP (ref 0.9–1.8)
TSH SERPL-MCNC: 0.5 UIU/ML — SIGNIFICANT CHANGE UP (ref 0.27–4.2)

## 2025-09-03 ENCOUNTER — RESULT REVIEW (OUTPATIENT)
Age: 65
End: 2025-09-03

## 2025-09-03 ENCOUNTER — APPOINTMENT (OUTPATIENT)
Dept: INFUSION THERAPY | Facility: HOSPITAL | Age: 65
End: 2025-09-03

## 2025-09-03 ENCOUNTER — NON-APPOINTMENT (OUTPATIENT)
Age: 65
End: 2025-09-03

## 2025-09-03 ENCOUNTER — APPOINTMENT (OUTPATIENT)
Dept: HEMATOLOGY ONCOLOGY | Facility: CLINIC | Age: 65
End: 2025-09-03
Payer: COMMERCIAL

## 2025-09-03 VITALS
HEART RATE: 97 BPM | BODY MASS INDEX: 28.25 KG/M2 | WEIGHT: 147.71 LBS | RESPIRATION RATE: 17 BRPM | TEMPERATURE: 96.7 F | SYSTOLIC BLOOD PRESSURE: 134 MMHG | DIASTOLIC BLOOD PRESSURE: 79 MMHG | OXYGEN SATURATION: 97 %

## 2025-09-03 DIAGNOSIS — C34.12 MALIGNANT NEOPLASM OF UPPER LOBE, LEFT BRONCHUS OR LUNG: ICD-10-CM

## 2025-09-03 DIAGNOSIS — C77.1 SECONDARY AND UNSPECIFIED MALIGNANT NEOPLASM OF INTRATHORACIC LYMPH NODES: ICD-10-CM

## 2025-09-03 PROCEDURE — 99214 OFFICE O/P EST MOD 30 MIN: CPT

## 2025-09-03 PROCEDURE — G2211 COMPLEX E/M VISIT ADD ON: CPT | Mod: NC

## 2025-09-04 DIAGNOSIS — D64.9 ANEMIA, UNSPECIFIED: ICD-10-CM

## 2025-09-08 ENCOUNTER — NON-APPOINTMENT (OUTPATIENT)
Age: 65
End: 2025-09-08

## 2025-09-08 DIAGNOSIS — D50.9 IRON DEFICIENCY ANEMIA, UNSPECIFIED: ICD-10-CM

## 2025-09-08 LAB
BASOPHILS # BLD AUTO: 0.1 K/UL
BASOPHILS NFR BLD AUTO: 0.9 %
EOSINOPHIL # BLD AUTO: 1.09 K/UL
EOSINOPHIL NFR BLD AUTO: 10.1 %
FERRITIN SERPL-MCNC: 124 NG/ML
FOLATE SERPL-MCNC: 17.9 NG/ML
HCT VFR BLD CALC: 32.2 %
HGB BLD-MCNC: 9.9 G/DL
IMM GRANULOCYTES NFR BLD AUTO: 0.3 %
IRON SATN MFR SERPL: 18 %
IRON SERPL-MCNC: 50 UG/DL
LYMPHOCYTES # BLD AUTO: 2.98 K/UL
LYMPHOCYTES NFR BLD AUTO: 27.6 %
MAN DIFF?: NORMAL
MCHC RBC-ENTMCNC: 29.8 PG
MCHC RBC-ENTMCNC: 30.7 G/DL
MCV RBC AUTO: 97 FL
MONOCYTES # BLD AUTO: 1.12 K/UL
MONOCYTES NFR BLD AUTO: 10.4 %
NEUTROPHILS # BLD AUTO: 5.46 K/UL
NEUTROPHILS NFR BLD AUTO: 50.7 %
PLATELET # BLD AUTO: 357 K/UL
RBC # BLD: 3.32 M/UL
RBC # BLD: 3.32 M/UL
RBC # FLD: 13.8 %
RETICS # AUTO: 2 %
RETICS AGGREG/RBC NFR: 65.4 K/UL
TIBC SERPL-MCNC: 272 UG/DL
UIBC SERPL-MCNC: 222 UG/DL
VIT B12 SERPL-MCNC: 1175 PG/ML
WBC # FLD AUTO: 10.78 K/UL

## 2025-09-08 RX ORDER — CHLORHEXIDINE GLUCONATE 4 %
325 (65 FE) LIQUID (ML) TOPICAL
Qty: 90 | Refills: 1 | Status: ACTIVE | COMMUNITY
Start: 2025-09-08 | End: 1900-01-01